# Patient Record
Sex: FEMALE | Race: WHITE
[De-identification: names, ages, dates, MRNs, and addresses within clinical notes are randomized per-mention and may not be internally consistent; named-entity substitution may affect disease eponyms.]

---

## 2019-04-05 ENCOUNTER — HOSPITAL ENCOUNTER (OUTPATIENT)
Dept: HOSPITAL 53 - M SMT | Age: 79
End: 2019-04-05
Attending: NURSE PRACTITIONER
Payer: MEDICARE

## 2019-04-05 DIAGNOSIS — N13.30: Primary | ICD-10-CM

## 2019-04-05 LAB
BUN SERPL-MCNC: 25 MG/DL (ref 7–18)
CALCIUM SERPL-MCNC: 8.5 MG/DL (ref 8.8–10.2)
CHLORIDE SERPL-SCNC: 95 MEQ/L (ref 98–107)
CO2 SERPL-SCNC: 28 MEQ/L (ref 21–32)
CREAT SERPL-MCNC: 1.1 MG/DL (ref 0.55–1.3)
GFR SERPL CREATININE-BSD FRML MDRD: 50.7 ML/MIN/{1.73_M2} (ref 32–?)
GLUCOSE SERPL-MCNC: 100 MG/DL (ref 70–100)
POTASSIUM SERPL-SCNC: 4.5 MEQ/L (ref 3.5–5.1)
SODIUM SERPL-SCNC: 130 MEQ/L (ref 136–145)

## 2019-04-10 ENCOUNTER — HOSPITAL ENCOUNTER (INPATIENT)
Dept: HOSPITAL 53 - M ICU | Age: 79
LOS: 14 days | Discharge: HOME | DRG: 871 | End: 2019-04-24
Attending: INTERNAL MEDICINE | Admitting: INTERNAL MEDICINE
Payer: MEDICARE

## 2019-04-10 VITALS — SYSTOLIC BLOOD PRESSURE: 106 MMHG | DIASTOLIC BLOOD PRESSURE: 53 MMHG

## 2019-04-10 VITALS — DIASTOLIC BLOOD PRESSURE: 68 MMHG | SYSTOLIC BLOOD PRESSURE: 117 MMHG

## 2019-04-10 VITALS — SYSTOLIC BLOOD PRESSURE: 97 MMHG | DIASTOLIC BLOOD PRESSURE: 49 MMHG

## 2019-04-10 VITALS — BODY MASS INDEX: 29.52 KG/M2 | WEIGHT: 150.36 LBS | HEIGHT: 60 IN

## 2019-04-10 VITALS — DIASTOLIC BLOOD PRESSURE: 44 MMHG | SYSTOLIC BLOOD PRESSURE: 90 MMHG

## 2019-04-10 VITALS — SYSTOLIC BLOOD PRESSURE: 120 MMHG | DIASTOLIC BLOOD PRESSURE: 58 MMHG

## 2019-04-10 VITALS — DIASTOLIC BLOOD PRESSURE: 56 MMHG | SYSTOLIC BLOOD PRESSURE: 112 MMHG

## 2019-04-10 VITALS — SYSTOLIC BLOOD PRESSURE: 122 MMHG | DIASTOLIC BLOOD PRESSURE: 56 MMHG

## 2019-04-10 VITALS — DIASTOLIC BLOOD PRESSURE: 43 MMHG | SYSTOLIC BLOOD PRESSURE: 91 MMHG

## 2019-04-10 VITALS — SYSTOLIC BLOOD PRESSURE: 101 MMHG | DIASTOLIC BLOOD PRESSURE: 46 MMHG

## 2019-04-10 VITALS — DIASTOLIC BLOOD PRESSURE: 94 MMHG | SYSTOLIC BLOOD PRESSURE: 142 MMHG

## 2019-04-10 VITALS — DIASTOLIC BLOOD PRESSURE: 40 MMHG | SYSTOLIC BLOOD PRESSURE: 81 MMHG

## 2019-04-10 VITALS — DIASTOLIC BLOOD PRESSURE: 38 MMHG | SYSTOLIC BLOOD PRESSURE: 80 MMHG

## 2019-04-10 VITALS — DIASTOLIC BLOOD PRESSURE: 53 MMHG | SYSTOLIC BLOOD PRESSURE: 110 MMHG

## 2019-04-10 VITALS — SYSTOLIC BLOOD PRESSURE: 133 MMHG | DIASTOLIC BLOOD PRESSURE: 80 MMHG

## 2019-04-10 VITALS — SYSTOLIC BLOOD PRESSURE: 91 MMHG | DIASTOLIC BLOOD PRESSURE: 44 MMHG

## 2019-04-10 VITALS — SYSTOLIC BLOOD PRESSURE: 111 MMHG | DIASTOLIC BLOOD PRESSURE: 53 MMHG

## 2019-04-10 VITALS — DIASTOLIC BLOOD PRESSURE: 39 MMHG | SYSTOLIC BLOOD PRESSURE: 82 MMHG

## 2019-04-10 VITALS — DIASTOLIC BLOOD PRESSURE: 46 MMHG | SYSTOLIC BLOOD PRESSURE: 92 MMHG

## 2019-04-10 VITALS — SYSTOLIC BLOOD PRESSURE: 88 MMHG | DIASTOLIC BLOOD PRESSURE: 49 MMHG

## 2019-04-10 VITALS — SYSTOLIC BLOOD PRESSURE: 104 MMHG | DIASTOLIC BLOOD PRESSURE: 49 MMHG

## 2019-04-10 VITALS — DIASTOLIC BLOOD PRESSURE: 53 MMHG | SYSTOLIC BLOOD PRESSURE: 115 MMHG

## 2019-04-10 VITALS — SYSTOLIC BLOOD PRESSURE: 105 MMHG | DIASTOLIC BLOOD PRESSURE: 52 MMHG

## 2019-04-10 VITALS — DIASTOLIC BLOOD PRESSURE: 47 MMHG | SYSTOLIC BLOOD PRESSURE: 97 MMHG

## 2019-04-10 VITALS — DIASTOLIC BLOOD PRESSURE: 43 MMHG | SYSTOLIC BLOOD PRESSURE: 89 MMHG

## 2019-04-10 VITALS — DIASTOLIC BLOOD PRESSURE: 52 MMHG | SYSTOLIC BLOOD PRESSURE: 97 MMHG

## 2019-04-10 VITALS — DIASTOLIC BLOOD PRESSURE: 51 MMHG | SYSTOLIC BLOOD PRESSURE: 98 MMHG

## 2019-04-10 VITALS — DIASTOLIC BLOOD PRESSURE: 39 MMHG | SYSTOLIC BLOOD PRESSURE: 79 MMHG

## 2019-04-10 VITALS — DIASTOLIC BLOOD PRESSURE: 48 MMHG | SYSTOLIC BLOOD PRESSURE: 93 MMHG

## 2019-04-10 VITALS — SYSTOLIC BLOOD PRESSURE: 115 MMHG | DIASTOLIC BLOOD PRESSURE: 52 MMHG

## 2019-04-10 VITALS — SYSTOLIC BLOOD PRESSURE: 88 MMHG | DIASTOLIC BLOOD PRESSURE: 43 MMHG

## 2019-04-10 DIAGNOSIS — D63.1: ICD-10-CM

## 2019-04-10 DIAGNOSIS — E87.5: ICD-10-CM

## 2019-04-10 DIAGNOSIS — J44.0: ICD-10-CM

## 2019-04-10 DIAGNOSIS — S32.691A: ICD-10-CM
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
## 2019-04-10 DIAGNOSIS — Z79.82: ICD-10-CM

## 2019-04-10 DIAGNOSIS — E87.1: ICD-10-CM

## 2019-04-10 DIAGNOSIS — E87.2: ICD-10-CM

## 2019-04-10 DIAGNOSIS — N13.30: ICD-10-CM

## 2019-04-10 DIAGNOSIS — I50.9: ICD-10-CM

## 2019-04-10 DIAGNOSIS — E11.9: ICD-10-CM

## 2019-04-10 DIAGNOSIS — I27.20: ICD-10-CM

## 2019-04-10 DIAGNOSIS — E78.5: ICD-10-CM

## 2019-04-10 DIAGNOSIS — R65.21: ICD-10-CM

## 2019-04-10 DIAGNOSIS — Z88.8: ICD-10-CM

## 2019-04-10 DIAGNOSIS — N17.9: ICD-10-CM

## 2019-04-10 DIAGNOSIS — I71.4: ICD-10-CM

## 2019-04-10 DIAGNOSIS — I13.0: ICD-10-CM

## 2019-04-10 DIAGNOSIS — M10.9: ICD-10-CM

## 2019-04-10 DIAGNOSIS — A41.9: Primary | ICD-10-CM

## 2019-04-10 DIAGNOSIS — Z79.899: ICD-10-CM

## 2019-04-10 DIAGNOSIS — F03.90: ICD-10-CM

## 2019-04-10 DIAGNOSIS — Z88.0: ICD-10-CM
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
## 2019-04-10 DIAGNOSIS — K21.9: ICD-10-CM

## 2019-04-10 DIAGNOSIS — M81.0: ICD-10-CM

## 2019-04-10 DIAGNOSIS — E83.51: ICD-10-CM

## 2019-04-10 DIAGNOSIS — E03.9: ICD-10-CM

## 2019-04-10 LAB
ALBUMIN SERPL BCG-MCNC: 2.3 GM/DL (ref 3.2–5.2)
ALT SERPL W P-5'-P-CCNC: 16 U/L (ref 12–78)
BASE EXCESS BLDA CALC-SCNC: -7.9 MMOL/L (ref -2–2)
BASOPHILS # BLD AUTO: 0 10^3/UL (ref 0–0.2)
BASOPHILS NFR BLD AUTO: 0.2 % (ref 0–1)
BILIRUB SERPL-MCNC: 0.3 MG/DL (ref 0.2–1)
BUN SERPL-MCNC: 54 MG/DL (ref 7–18)
CALCIUM SERPL-MCNC: 7.6 MG/DL (ref 8.8–10.2)
CHLORIDE SERPL-SCNC: 99 MEQ/L (ref 98–107)
CO2 BLDA CALC-SCNC: 18.6 MEQ/L (ref 23–31)
CO2 SERPL-SCNC: 17 MEQ/L (ref 21–32)
CREAT SERPL-MCNC: 3.83 MG/DL (ref 0.55–1.3)
EOSINOPHIL # BLD AUTO: 0 10^3/UL (ref 0–0.5)
EOSINOPHIL NFR BLD AUTO: 0 % (ref 0–3)
GFR SERPL CREATININE-BSD FRML MDRD: 12 ML/MIN/{1.73_M2} (ref 32–?)
GLUCOSE SERPL-MCNC: 136 MG/DL (ref 70–100)
HCO3 BLDA-SCNC: 17.5 MEQ/L (ref 22–26)
HCO3 STD BLDA-SCNC: 18 MEQ/L (ref 22–26)
HCT VFR BLD AUTO: 28.2 % (ref 36–47)
HGB BLD-MCNC: 8.8 G/DL (ref 12–15.5)
INR PPP: 1.28
LYMPHOCYTES # BLD AUTO: 1.1 10^3/UL (ref 1.5–4.5)
LYMPHOCYTES NFR BLD AUTO: 6.1 % (ref 24–44)
MAGNESIUM SERPL-MCNC: 1.7 MG/DL (ref 1.8–2.4)
MCH RBC QN AUTO: 27.9 PG (ref 27–33)
MCHC RBC AUTO-ENTMCNC: 31.2 G/DL (ref 32–36.5)
MCV RBC AUTO: 89.5 FL (ref 80–96)
MONOCYTES # BLD AUTO: 1.8 10^3/UL (ref 0–0.8)
MONOCYTES NFR BLD AUTO: 10 % (ref 0–5)
NEUTROPHILS # BLD AUTO: 14.7 10^3/UL (ref 1.8–7.7)
NEUTROPHILS NFR BLD AUTO: 82.8 % (ref 36–66)
PCO2 BLDA: 35.3 MMHG (ref 35–45)
PH BLDA: 7.31 UNITS (ref 7.35–7.45)
PLATELET # BLD AUTO: 205 10^3/UL (ref 150–450)
PO2 BLDA: 94.7 MMHG (ref 75–100)
POTASSIUM SERPL-SCNC: 4.9 MEQ/L (ref 3.5–5.1)
PROT SERPL-MCNC: 5.5 GM/DL (ref 6.4–8.2)
PROTHROMBIN TIME: 16.2 SECONDS (ref 12.1–14.4)
RBC # BLD AUTO: 3.15 10^6/UL (ref 4–5.4)
SAO2 % BLDA: 97.5 % (ref 95–99)
SODIUM SERPL-SCNC: 129 MEQ/L (ref 136–145)
TSH SERPL DL<=0.005 MIU/L-ACNC: 0.47 UIU/ML (ref 0.36–3.74)
VANCOMYCIN PEAK SERPL-MCNC: 13.3 UG/ML (ref 18–40)
WBC # BLD AUTO: 17.7 10^3/UL (ref 4–10)

## 2019-04-10 PROCEDURE — 02HV33Z INSERTION OF INFUSION DEVICE INTO SUPERIOR VENA CAVA, PERCUTANEOUS APPROACH: ICD-10-PCS | Performed by: SPECIALIST

## 2019-04-10 RX ADMIN — ACETAMINOPHEN PRN MG: 325 TABLET ORAL at 12:50

## 2019-04-10 RX ADMIN — IPRATROPIUM BROMIDE AND ALBUTEROL SULFATE SCH ML: .5; 3 SOLUTION RESPIRATORY (INHALATION) at 23:29

## 2019-04-10 RX ADMIN — SODIUM CHLORIDE SCH UNITS: 4.5 INJECTION, SOLUTION INTRAVENOUS at 21:07

## 2019-04-10 RX ADMIN — FLUTICASONE PROPIONATE AND SALMETEROL XINAFOATE SCH PUFF: 230; 21 AEROSOL, METERED RESPIRATORY (INHALATION) at 21:00

## 2019-04-10 RX ADMIN — PANTOPRAZOLE SODIUM SCH MG: 20 TABLET, DELAYED RELEASE ORAL at 21:07

## 2019-04-10 RX ADMIN — IPRATROPIUM BROMIDE AND ALBUTEROL SULFATE SCH ML: .5; 3 SOLUTION RESPIRATORY (INHALATION) at 20:19

## 2019-04-10 RX ADMIN — DEXTROSE MONOHYDRATE SCH MLS/HR: 50 INJECTION, SOLUTION INTRAVENOUS at 21:07

## 2019-04-10 RX ADMIN — IPRATROPIUM BROMIDE AND ALBUTEROL SULFATE SCH ML: .5; 3 SOLUTION RESPIRATORY (INHALATION) at 15:24

## 2019-04-10 RX ADMIN — INSULIN LISPRO SCH UNITS: 100 INJECTION, SOLUTION INTRAVENOUS; SUBCUTANEOUS at 17:43

## 2019-04-10 RX ADMIN — MORPHINE SULFATE PRN MG: 4 INJECTION INTRAVENOUS at 17:42

## 2019-04-10 RX ADMIN — MEROPENEM AND SODIUM CHLORIDE SCH MLS/HR: 500 INJECTION, SOLUTION INTRAVENOUS at 15:40

## 2019-04-10 NOTE — REP
Portable chest, 06:46 p.m., single AP semi upright view:

There are no comparisons.

There are no focal infiltrates.

There are no pleural effusions.

There is mild diffuse interstitial coarsening.  This could be chronic, acute or

combination.

The mohini, mediastinum, skeletal structures are unremarkable except for orthopedic

screws in the right humeral head.

Impression:

Interstitial coarsening, chronic, acute versus combination.

Otherwise, negative portable chest.

 

 

Electronically Signed by

Bennie Murrell MD 04/10/2019 07:13 P

## 2019-04-10 NOTE — CR
DATE OF CONSULTATION:  04/10/2019

 

 

History was obtained from the chart and some history of obtained and the patient;

however, she does have some mild dementia and is a poor historian.

 

HISTORY OF PRESENT ILLNESS:  The patient is an 79-year-old female with a past

medical history of chronic obstructive pulmonary disease (COPD) with reported

pulmonary hypertension, diabetes, hypertension, hypothyroidism, chronic kidney

disease (CKD) with an atrophic left kidney had previously been admitted in

January of 2019 where she was intubated in the ICU with pneumonia, as well as

with acute renal failure requiring intermittent dialysis and a nephrostomy tube.

The patient still has a nephrostomy tube, as well as does intermittent

self-catheterization for urine output.  She presented to an outside hospital with

complaints of weakness and fatigue for the past few days.  She has noted

decreasing urine output.  The patient also had subjective chills and had been

complaining of pain in her right hip and leg.  She denied any chest pain.  She

denied any increased shortness of breath, no coughing, no abdominal pain,  no

nausea or vomiting.  On admission, the patient was noted to have some

hypotension, was given IV fluids with improvement in her blood pressure.  The

patient was also febrile with leukocytosis and was started on broad-spectrum

antibiotics.  The patient was also found to have worsening of her chronic renal

failure and nephrology was consulted for possible initiation of dialysis.

 

HISTORY OF PRESENT ILLNESS:

1.  Chronic obstructive pulmonary disease (COPD).

2.  Anemia of chronic disease

3.  Diabetes

4.  Hypertension

5.  Gastroesophageal reflux disease.

6.  Congestive heart failure (CHF)

7.  Hyperlipidemia

8.  Dementia.

9.  Chronic kidney disease (CKD)

10. Hypothyroidism

11. Osteoporosis

12. Status post nephrostomy tube for hydronephrosis

13.  Pelvic fracture

 

ALLERGIES: PENICILLIN, RED DYE, PEANUT OIL.

 

PAST SURGICAL HISTORY:  Right rotator cuff repair, right nephrostomy tube

placement, tumor on her face removed.

 

FAMILY HISTORY:  Noncontributory.

 

SOCIAL HISTORY:  The patient is a nonsmoker.  Denies alcohol use.  She is

. HOME MEDICATIONS:  Amlodipine, aspirin, atorvastatin, Breo, furosemide,

glipizide, Synthroid, linigliptin, lisinopril, magnesium oxide, metformin,

metoprolol, pantoprazole paroxetine, spiriva, albuterol as needed.

 

PHYSICAL EXAMINATION:  T-max 103.1, T-current 98.2 pulse 73 and blood pressure

97/47, oxygen saturation 98% on 4 liters nasal cannula.  General:  The patient is

an elderly female.  He is lying in bed, is awake and alert, oriented x2, is

answering questions appropriately.  She is hard of hearing.  HEENT:

Normocephalic, atraumatic.  Mucous membranes are moist.  Pupils are reactive.

Neck is supple.  Trachea is midline.  No palpable cervical adenopathy.  No

jugular venous distension (JVD).  Regular rate and rhythm.  Normal S1-S2.  Unable

to appreciate any murmurs.  Pulmonary:  Diminished breath sounds bilaterally.

Few crackles at the bases.  Abdomen is obese, soft, nontender, nondistended.

Lower extremities: There is no significant pitting edema bilaterally.  The

patient has a right-sided nephrostomy tube.

 

LABORATORY:  White blood count (WBC) 17.7, hemoglobin 8.8, platelets 205.

Chemistry:  Sodium 129, potassium 4.9, chloride 99, bicarbonate 17 and gap 13,

BUN 54, creatinine 3.83, glucose is 136, lactic acid 2.2.  Repeat was 2.5,

calcium 7.6, magnesium 1.7, 2.3, AST, ALT were normal, thyroid simulating hormone

(TSH) 0.470.  Coagulase:  INR was 1.28.  Arterial blood gas (ABG):  pH 7.313,

pCO2 of 35.3, pO2 of 94.7.

 

Chest x-ray:  After central line placement, showed some increased vascular

markings and increased interstitial markings bilaterally.  There is some

atelectasis in the bases of her lungs and some small trace effusions

bilaterally.

 

ASSESSMENT/PLAN:  Ms. Cordoba is an 79-year-old female with a past medical history

of chronic obstructive pulmonary disease (COPD), diabetes, hypertension,

hyperlipidemia, hypothyroidism, dementia, chronic kidney disease (CKD) with a

history of an atrophic left kidney and a history of a right nephrostomy tube

placed after hydronephrosis, history of intermittent dialysis in the past, who

presented with increasing fatigue and weakness for the past few days.  The

patient was febrile.  She was noted to have leukocytosis.  Patient with sepsis

likely secondary to a urinary source given her history of nephrostomy tube, as

well as a history of apparently intermittent self catheterization.  Her chest

x-ray shows evidence of some pulmonary vascular congestion and atelectasis, but

no focal opacities or infiltrates to suggest pneumonia.  The patient was started

on broad-spectrum antibiotics and given IV fluid hydration; however, she

continues to have increasing lactic acidosis, as well as borderline blood

pressures.  She has received more than 2 liters of normal saline for adequate

fluid resuscitation at this point.  She is also noted to be in oliguric renal

failure with minimal urine output from her Garner and from her nephrostomy bag.

 

Discussed with the patient and her son about central line placement for

vasopressor support for her blood pressure.  There are in agreement; therefore; a

left internal jugular triple lumen was placed and will start Levophed if needed

to maintain a MAP above 65.

Given the evidence of some pulmonary vascular congestion on chest x-ray, as well

a her need for supplemental oxygen, would decrease her IV fluid hydration.  The

patient has received adequate fluid hydration for her sepsis.  Would continue

with broad-spectrum antibiotics with vancomycin and meropenem and check a

procalcitonin. Will followup with renal recommendations.  The patient may require

hemodialysis.  She continues to be oliguric and azotemic.  Will followup her

repeat arterial blood gas (ABG) and renal function panel.

 

For her chronic obstructive pulmonary disease (COPD), will continue with Advair

and Spiriva and continue with nebulizers as needed.

 

Continue with nasal cannula oxygen supplementation to maintain an oxygen

saturation above 90%

 

Deep vein thrombosis (DVT) prophylaxis with heparin.

 

CODE STATUS.  The patient is full code.

 

Total critical care time spent not including any procedures approximately 1 hour

and 30 minutes.

## 2019-04-10 NOTE — HPEPDOC
General


Date of Admission


4/10/2019


Primary Care Physician:  A


Attending Physician:  ANNETTE HOUSTON MD


Chief Complaint


The patient is a 79-year-old female admitted with a reason for visit of Sepsis, 

[place], Renal Failure.


Source:  Patient, RN/MD, EMS notes reviewed, Old records


Exam Limitations:  No limitations


Timing/Duration:  Unsure





History of Present Illness


This is 79 years old white female transferred from [facility] 

emergency room..


Patient has a past medical history of COPD with pulmonary hypertension, diabetes

mellitus, hypertension, hypothyroidism, history of C daily with atrophic left 

kidney.


All of's, so patient had anorexia many episodes. Acute renal failure with a 

creatinine approximately 0.8, but slowly has been increasing to 3.0.


In January 2019. She needed intermittent dialysis and eventually nephrostomy 

tube was placed in


Patient also was admitted to ICU after being intubated, found concurrent 

pneumonia. She has been on a creatinine baseline of 0.7, till day of admission 

when she presented to [facility] with chief complaints of feeling weak and

poor for the last few days. Patient has a nephrostomy tube and also does 

intermittent self cath for urinary output but has been slowly decreasing.


Patient was transferred to [facility] with possible sepsis, possible 

nephrostomy tube dysfunction, etc.





Home Medications


Scheduled


Acetaminophen (Acetaminophen) 325 Mg Tablet, 325 MG PO ONCE, (Reported)


Amlodipine Besylate (Amlodipine Besylate) 5 Mg Tablet, 5 MG PO DAILY, (Reported)


Aspirin (Aspir 81) 81 Mg Tablet.dr, 81 MG PO DAILY, (Reported)


Atorvastatin Calcium (Atorvastatin Calcium) 20 Mg Tablet, 20 MG PO DAILY, 

(Reported)


Fluticasone/Vilanterol (Breo Ellipta 100-25 Mcg INH) 1 Each Blst.w.dev, 1 PUFF 

INH DAILY, (Reported)


Furosemide (Furosemide) 40 Mg Tablet, 40 MG PO DAILY, (Reported)


Glipizide (Glipizide ER) 10 Mg Tab.er.24, 10 MG PO DAILY, (Reported)


Levothyroxine Sodium (Synthroid) 100 Mcg Tablet, 100 MCG PO DAILY, (Reported)


Linagliptin (Tradjenta) 5 Mg Tablet, 5 MG PO DAILY, (Reported)


Lisinopril (Lisinopril) 20 Mg Tablet, 20 MG PO DAILY, (Reported)


Magnesium Oxide (Magnesium Oxide) 400 Mg Tablet, 400 MG PO BID, (Reported)


Metformin HCl (Metformin HCl ER) 750 Mg Tab.er.24h, 750 MG PO QPM, (Reported)


   TAKE WITH EVENING MEAL 


Metoprolol Succinate (Metoprolol Succinate) 25 Mg Tab.er.24h, 25 MG PO DAILY, 

(Reported)


Pantoprazole Sodium (Pantoprazole Sodium) 20 Mg Tablet.dr, 20 MG PO BID, 

(Reported)


Paroxetine HCl (Paroxetine) 10 Mg Tablet, 10 MG PO DAILY, (Reported)


Potassium Chloride (Potassium Chloride) 20 Meq Tablet.er, 40 MEQ PO DAILY, 

(Reported)


Tiotropium Bromide Monohydrate (Spiriva) 18 Mcg Cap.w.dev, 1 INHALATION INH 

DAILY, (Reported)





Scheduled PRN


Albuterol Sulfate (Proair Hfa) 8.5 Gm Hfa.aer.ad, 2 PUFF INH Q4-6 PRN for CHUCK

RTNESS OF BREATH, (Reported)


Diphenhydramine HCl (Diphenhydramine HCl) 25 Mg Capsule, 25 MG PO Q4H PRN for 

ALLERGY SYMPTOMS, (Reported)


Oxycodone HCl (Oxycodone HCl) 10 Mg Tablet, 10 MG PO Q6H PRN for PAIN, 

(Reported)





Allergies


Coded Allergies:  


     Penicillins (Unverified  Allergy, Unknown, 4/10/19)


     budesonide (Unverified  Adverse Reaction, Intermediate, 4/10/19)


   


   NOTES AT PHARMACY [facility] PATIENT HAD BAD SINUS REACTION


   


   AFTER USING


     formoterol (Unverified  Adverse Reaction, Intermediate, 4/10/19)


   


   NOTES AT PHARMACY Critical access hospital PATIENT HAD BAD SINUS REACTION


   


   AFTER USING





Past Medical History


Medical History


As per [facility]


Surgical History


As per [facility]





Social History


* Smoker:  Denies


Alcohol:  Denies


Drugs:  denies





Review of Systems


Constitutional:  Reports: Malaise, Weakness; 


   Denies: Chills, Fever, Night Sweats


Eyes:  Denies: Pain, Vision change


ENT:  Denies: Head Aches, Ear Pain, Dysphagia


Skin:  Denies: Rash, Lesions, Breakdown


Pulmonary:  Denies: Dyspnea, Cough


Cardiovascular:  Denies: Chest Pain, Palpitations, Orthopnea, Paroxysmal Noc. 

Dyspnea, Lt Headedness


Gastrointestinal:  Denies: Nausea, Vomiting, Abdominal Pain, Diarrhea


Genitourinary:  Denies: Dysuria, Frequency, Incontinence, Retention


Hematologic:  Denies: Bruising, Bleeding Excessively


Musculoskeletal:  Denies: Neck Pain, Back Pain, Joint Pain, Muscle Pain, Spasms


Neurological:  Denies: Weakness, Numbness, Change in speech, Confusion


Psych:  Reports: Mood Normal; 


   Denies: Depression, Memory Issues





Physical Examination


General Exam:  Positive: Alert, No Acute Distress


Eye Exam:  Positive: PERRLA, Conjunctiva & lids normal, EOMI; 


   Negative: Sclera icteric


ENT Exam:  Positive: Atraumatic, Mucous membr. moist/pink, Pharynx Normal


Neck Exam:  Positive: Supple; 


   Negative: JVD, thyromegaly


Chest Exam:  Positive: Clear to auscultation, Normal air movement


Heart Exam:  Positive: Rate Normal, Regular Rhythm, Normal S1, Normal S2; 


   Negative: Murmurs, Rubs


Telemetry:  Positive: No significant arrhythmia


Abdomen Exam:  Positive: Normal bowel sounds, Soft; 


   Negative: Tenderness, Hepatospenomegaly


Extremity Exam:  Positive: Normal pulses; 


   Negative: Clubbing, Cyanosis, Edema


Skin Exam:  Positive: Nl turgor and temperature; 


   Negative: Breakdown, Lesion


Neuro Exam:  Positive: Normal Gait, Normal Speech, Cranial Nerves 3-12 NL, 

Reflexes 2+


Psych Exam:  Positive: Mental status NL, Mood NL, Oriented x 3





Vital Signs





Vital Signs








  Date Time  Temp Pulse Resp B/P (MAP) Pulse Ox O2 Delivery O2 Flow Rate FiO2


 


4/10/19 15:25  99      


 


4/10/19 13:30 102.0   112/56 (74) 98  4.0 


 


4/10/19 13:00   20     











Laboratory Data


Labs 24H


Laboratory Tests 2


4/10/19 13:00: 


Anion Gap 13, Glomerular Filtration Rate 12.0L, Blood Urea Nitrogen 54H, 

Creatinine 3.83H, Sodium Level 129L, Potassium Level 4.9, Chloride Level 99, 

Carbon Dioxide Level 17L, Calcium Level 7.6L, Aspartate Amino Transf (AST/SGOT) 

27, Alanine Aminotransferase (ALT/SGPT) 16, Alkaline Phosphatase 93, Total 

Bilirubin 0.3, Total Protein 5.5L, Albumin 2.3L, Magnesium Level 1.7L, 

Albumin/Globulin Ratio 0.72L, Thyroid Stimulating Hormone (TSH) 0.470, 

Vancomycin Level Peak 13.3L


4/10/19 13:09: 


Blood Gas Bicarbonate Standard 18.0L, Arterial Blood pH 7.313L, Arterial Blood 

Partial Pressure CO2 35.3, Arterial Blood Partial Pressure O2 94.7, Arterial 

Blood Total CO2 18.6L, Arterial Blood HCO3 17.5L, Arterial Blood Base Excess -

7.9L, Arterial Blood Oxygen Saturation 97.5


4/10/19 13:42: 


Immature Granulocyte % (Auto) 0.9, White Blood Count 17.7H, Red Blood Count 

3.15L, Hemoglobin 8.8L, Hematocrit 28.2L, Mean Corpuscular Volume 89.5, Mean 

Corpuscular Hemoglobin 27.9, Mean Corpuscular Hemoglobin Concent 31.2L, Red Cell

Distribution Width 14.9H, Platelet Count 205, Neutrophils (%) (Auto) 82.8H, 

Lymphocytes (%) (Auto) 6.1L, Monocytes (%) (Auto) 10.0H, Eosinophils (%) (Auto) 

0.0, Basophils (%) (Auto) 0.2, Neutrophils # (Auto) 14.7H, Lymphocytes # (Auto) 

1.1L, Monocytes # (Auto) 1.8H, Eosinophils # (Auto) 0.0, Basophils # (Auto) 0.0,

Nucleated Red Blood Cells % (auto) 0.0, Prothrombin Time 16.2H, Prothromb Time 

International Ratio 1.28, Lactic Acid Level 2.2*H


CBC/BMP


Laboratory Tests


4/10/19 13:00








Calcium Level 7.6 L, Aspartate Amino Transf (AST/SGOT) 27, Alanine 

Aminotransferase (ALT/SGPT) 16, Alkaline Phosphatase 93, Total Bilirubin 0.3, 

Total Protein 5.5 L, Albumin 2.3 L





4/10/19 13:42








Red Blood Count 3.15 L, Mean Corpuscular Volume 89.5, Mean Corpuscular Hemog

lobin 27.9, Mean Corpuscular Hemoglobin Concent 31.2 L, Red Cell Distribution 

Width 14.9 H, Neutrophils (%) (Auto) 82.8 H, Lymphocytes (%) (Auto) 6.1 L, 

Monocytes (%) (Auto) 10.0 H, Eosinophils (%) (Auto) 0.0, Basophils (%) (Auto) 

0.2, Neutrophils # (Auto) 14.7 H, Lymphocytes # (Auto) 1.1 L, Monocytes # (Auto)

1.8 H, Eosinophils # (Auto) 0.0, Basophils # (Auto) 0.0


Microbiology





Microbiology


4/10/19 Blood Culture, Received


          Pending


4/10/19 Blood Culture, Received


          Pending


4/10/19 Urine Culture, Received


          Pending





Problems





(1) Sepsis


Status:  Acute


Response to Treatment:  Uncompensated


Discussed With:  Consultant


Problem Text:  Admit to MICU


Normal saline 1 L bolus


Normal saline IVF at 100 mL per hour


Tylenol when necessary


Pancultures including blood and urine


CBC, CMP, lactic acid and UA


Chest x-ray


CT abdomen and pelvis without contrast


Start meropenem and vancomycin. Pharmacy to adjust dosage


Discussed with Dr. busch, will monitor nephrostomy tube output


Also, will monitor. Garner catheter output


Strict intake and output


Continue home meds except all antihypertensive meds








Plan / VTE


VTE Prophylaxis Ordered?:  Yes











ANNETTE HOUSTON MD              Apr 10, 2019 15:49

## 2019-04-11 VITALS — SYSTOLIC BLOOD PRESSURE: 114 MMHG | DIASTOLIC BLOOD PRESSURE: 49 MMHG

## 2019-04-11 VITALS — SYSTOLIC BLOOD PRESSURE: 90 MMHG | DIASTOLIC BLOOD PRESSURE: 44 MMHG

## 2019-04-11 VITALS — SYSTOLIC BLOOD PRESSURE: 108 MMHG | DIASTOLIC BLOOD PRESSURE: 51 MMHG

## 2019-04-11 VITALS — DIASTOLIC BLOOD PRESSURE: 56 MMHG | SYSTOLIC BLOOD PRESSURE: 151 MMHG

## 2019-04-11 VITALS — DIASTOLIC BLOOD PRESSURE: 57 MMHG | SYSTOLIC BLOOD PRESSURE: 120 MMHG

## 2019-04-11 VITALS — DIASTOLIC BLOOD PRESSURE: 56 MMHG | SYSTOLIC BLOOD PRESSURE: 132 MMHG

## 2019-04-11 VITALS — DIASTOLIC BLOOD PRESSURE: 53 MMHG | SYSTOLIC BLOOD PRESSURE: 111 MMHG

## 2019-04-11 VITALS — SYSTOLIC BLOOD PRESSURE: 97 MMHG | DIASTOLIC BLOOD PRESSURE: 46 MMHG

## 2019-04-11 VITALS — SYSTOLIC BLOOD PRESSURE: 89 MMHG | DIASTOLIC BLOOD PRESSURE: 51 MMHG

## 2019-04-11 VITALS — SYSTOLIC BLOOD PRESSURE: 122 MMHG | DIASTOLIC BLOOD PRESSURE: 54 MMHG

## 2019-04-11 VITALS — SYSTOLIC BLOOD PRESSURE: 112 MMHG | DIASTOLIC BLOOD PRESSURE: 50 MMHG

## 2019-04-11 VITALS — DIASTOLIC BLOOD PRESSURE: 51 MMHG | SYSTOLIC BLOOD PRESSURE: 106 MMHG

## 2019-04-11 VITALS — DIASTOLIC BLOOD PRESSURE: 51 MMHG | SYSTOLIC BLOOD PRESSURE: 117 MMHG

## 2019-04-11 VITALS — SYSTOLIC BLOOD PRESSURE: 118 MMHG | DIASTOLIC BLOOD PRESSURE: 58 MMHG

## 2019-04-11 VITALS — SYSTOLIC BLOOD PRESSURE: 87 MMHG | DIASTOLIC BLOOD PRESSURE: 43 MMHG

## 2019-04-11 VITALS — DIASTOLIC BLOOD PRESSURE: 61 MMHG | SYSTOLIC BLOOD PRESSURE: 125 MMHG

## 2019-04-11 VITALS — SYSTOLIC BLOOD PRESSURE: 100 MMHG | DIASTOLIC BLOOD PRESSURE: 55 MMHG

## 2019-04-11 VITALS — DIASTOLIC BLOOD PRESSURE: 56 MMHG | SYSTOLIC BLOOD PRESSURE: 112 MMHG

## 2019-04-11 VITALS — DIASTOLIC BLOOD PRESSURE: 53 MMHG | SYSTOLIC BLOOD PRESSURE: 116 MMHG

## 2019-04-11 VITALS — DIASTOLIC BLOOD PRESSURE: 52 MMHG | SYSTOLIC BLOOD PRESSURE: 112 MMHG

## 2019-04-11 VITALS — SYSTOLIC BLOOD PRESSURE: 110 MMHG | DIASTOLIC BLOOD PRESSURE: 51 MMHG

## 2019-04-11 VITALS — SYSTOLIC BLOOD PRESSURE: 89 MMHG | DIASTOLIC BLOOD PRESSURE: 46 MMHG

## 2019-04-11 VITALS — SYSTOLIC BLOOD PRESSURE: 103 MMHG | DIASTOLIC BLOOD PRESSURE: 52 MMHG

## 2019-04-11 VITALS — DIASTOLIC BLOOD PRESSURE: 70 MMHG | SYSTOLIC BLOOD PRESSURE: 112 MMHG

## 2019-04-11 VITALS — DIASTOLIC BLOOD PRESSURE: 55 MMHG | SYSTOLIC BLOOD PRESSURE: 106 MMHG

## 2019-04-11 VITALS — SYSTOLIC BLOOD PRESSURE: 114 MMHG | DIASTOLIC BLOOD PRESSURE: 52 MMHG

## 2019-04-11 VITALS — SYSTOLIC BLOOD PRESSURE: 110 MMHG | DIASTOLIC BLOOD PRESSURE: 52 MMHG

## 2019-04-11 VITALS — SYSTOLIC BLOOD PRESSURE: 116 MMHG | DIASTOLIC BLOOD PRESSURE: 54 MMHG

## 2019-04-11 VITALS — SYSTOLIC BLOOD PRESSURE: 171 MMHG | DIASTOLIC BLOOD PRESSURE: 73 MMHG

## 2019-04-11 VITALS — SYSTOLIC BLOOD PRESSURE: 103 MMHG | DIASTOLIC BLOOD PRESSURE: 49 MMHG

## 2019-04-11 VITALS — DIASTOLIC BLOOD PRESSURE: 53 MMHG | SYSTOLIC BLOOD PRESSURE: 112 MMHG

## 2019-04-11 VITALS — DIASTOLIC BLOOD PRESSURE: 54 MMHG | SYSTOLIC BLOOD PRESSURE: 122 MMHG

## 2019-04-11 VITALS — DIASTOLIC BLOOD PRESSURE: 53 MMHG | SYSTOLIC BLOOD PRESSURE: 109 MMHG

## 2019-04-11 VITALS — SYSTOLIC BLOOD PRESSURE: 187 MMHG | DIASTOLIC BLOOD PRESSURE: 81 MMHG

## 2019-04-11 LAB
ALBUMIN SERPL BCG-MCNC: 2.1 GM/DL (ref 3.2–5.2)
ALT SERPL W P-5'-P-CCNC: 12 U/L (ref 12–78)
BASE EXCESS BLDA CALC-SCNC: -6.7 MMOL/L (ref -2–2)
BASOPHILS # BLD AUTO: 0 10^3/UL (ref 0–0.2)
BASOPHILS NFR BLD AUTO: 0.2 % (ref 0–1)
BILIRUB SERPL-MCNC: 0.3 MG/DL (ref 0.2–1)
BUN SERPL-MCNC: 52 MG/DL (ref 7–18)
BUN SERPL-MCNC: 52 MG/DL (ref 7–18)
CA-I BLD-MCNC: 4.2 MG/DL (ref 4.5–5.3)
CALCIUM SERPL-MCNC: 7.2 MG/DL (ref 8.8–10.2)
CALCIUM SERPL-MCNC: 7.5 MG/DL (ref 8.8–10.2)
CHLORIDE SERPL-SCNC: 100 MEQ/L (ref 98–107)
CHLORIDE SERPL-SCNC: 98 MEQ/L (ref 98–107)
CO2 BLDA CALC-SCNC: 19.2 MEQ/L (ref 23–31)
CO2 SERPL-SCNC: 17 MEQ/L (ref 21–32)
CO2 SERPL-SCNC: 17 MEQ/L (ref 21–32)
CREAT SERPL-MCNC: 3.24 MG/DL (ref 0.55–1.3)
CREAT SERPL-MCNC: 3.79 MG/DL (ref 0.55–1.3)
EOSINOPHIL # BLD AUTO: 0 10^3/UL (ref 0–0.5)
EOSINOPHIL NFR BLD AUTO: 0.2 % (ref 0–3)
FLUAV RNA UPPER RESP QL NAA+PROBE: NEGATIVE
FLUBV RNA UPPER RESP QL NAA+PROBE: NEGATIVE
GFR SERPL CREATININE-BSD FRML MDRD: 12.2 ML/MIN/{1.73_M2} (ref 32–?)
GFR SERPL CREATININE-BSD FRML MDRD: 14.6 ML/MIN/{1.73_M2} (ref 32–?)
GLUCOSE SERPL-MCNC: 161 MG/DL (ref 70–100)
GLUCOSE SERPL-MCNC: 168 MG/DL (ref 70–100)
HCO3 BLDA-SCNC: 18.1 MEQ/L (ref 22–26)
HCO3 STD BLDA-SCNC: 18.9 MEQ/L (ref 22–26)
HCT VFR BLD AUTO: 25.3 % (ref 36–47)
HGB BLD-MCNC: 8.2 G/DL (ref 12–15.5)
LYMPHOCYTES # BLD AUTO: 1 10^3/UL (ref 1.5–4.5)
LYMPHOCYTES NFR BLD AUTO: 6.3 % (ref 24–44)
MAGNESIUM SERPL-MCNC: 2 MG/DL (ref 1.8–2.4)
MCH RBC QN AUTO: 29 PG (ref 27–33)
MCHC RBC AUTO-ENTMCNC: 32.4 G/DL (ref 32–36.5)
MCV RBC AUTO: 89.4 FL (ref 80–96)
MONOCYTES # BLD AUTO: 1.5 10^3/UL (ref 0–0.8)
MONOCYTES NFR BLD AUTO: 9.5 % (ref 0–5)
NEUTROPHILS # BLD AUTO: 13.2 10^3/UL (ref 1.8–7.7)
NEUTROPHILS NFR BLD AUTO: 82.1 % (ref 36–66)
PCO2 BLDA: 33.3 MMHG (ref 35–45)
PH BLDA: 7.35 UNITS (ref 7.35–7.45)
PHOSPHATE SERPL-MCNC: 4.4 MG/DL (ref 2.5–4.9)
PLATELET # BLD AUTO: 195 10^3/UL (ref 150–450)
PO2 BLDA: 63 MMHG (ref 75–100)
POTASSIUM SERPL-SCNC: 4.6 MEQ/L (ref 3.5–5.1)
POTASSIUM SERPL-SCNC: 4.7 MEQ/L (ref 3.5–5.1)
PROT SERPL-MCNC: 4.6 GM/DL (ref 6.4–8.2)
RBC # BLD AUTO: 2.83 10^6/UL (ref 4–5.4)
SAO2 % BLDA: 93.4 % (ref 95–99)
SODIUM SERPL-SCNC: 127 MEQ/L (ref 136–145)
SODIUM SERPL-SCNC: 128 MEQ/L (ref 136–145)
URATE SERPL-MCNC: 8.7 MG/DL (ref 2.6–6)
VANCOMYCIN SERPL-MCNC: 18.4 UG/ML
WBC # BLD AUTO: 16.1 10^3/UL (ref 4–10)

## 2019-04-11 RX ADMIN — MORPHINE SULFATE PRN MG: 4 INJECTION INTRAVENOUS at 05:22

## 2019-04-11 RX ADMIN — IPRATROPIUM BROMIDE AND ALBUTEROL SULFATE SCH ML: .5; 3 SOLUTION RESPIRATORY (INHALATION) at 03:58

## 2019-04-11 RX ADMIN — IPRATROPIUM BROMIDE AND ALBUTEROL SULFATE SCH ML: .5; 3 SOLUTION RESPIRATORY (INHALATION) at 15:15

## 2019-04-11 RX ADMIN — Medication SCH UNITS: at 18:08

## 2019-04-11 RX ADMIN — INSULIN LISPRO SCH UNITS: 100 INJECTION, SOLUTION INTRAVENOUS; SUBCUTANEOUS at 11:45

## 2019-04-11 RX ADMIN — ACETAMINOPHEN PRN MG: 325 TABLET ORAL at 21:13

## 2019-04-11 RX ADMIN — IPRATROPIUM BROMIDE AND ALBUTEROL SULFATE SCH ML: .5; 3 SOLUTION RESPIRATORY (INHALATION) at 08:10

## 2019-04-11 RX ADMIN — ACETAMINOPHEN PRN MG: 325 TABLET ORAL at 02:29

## 2019-04-11 RX ADMIN — MEROPENEM AND SODIUM CHLORIDE SCH MLS/HR: 500 INJECTION, SOLUTION INTRAVENOUS at 16:01

## 2019-04-11 RX ADMIN — INSULIN LISPRO SCH UNITS: 100 INJECTION, SOLUTION INTRAVENOUS; SUBCUTANEOUS at 01:24

## 2019-04-11 RX ADMIN — ATORVASTATIN CALCIUM SCH MG: 20 TABLET, FILM COATED ORAL at 08:51

## 2019-04-11 RX ADMIN — SODIUM CHLORIDE SCH UNITS: 4.5 INJECTION, SOLUTION INTRAVENOUS at 08:51

## 2019-04-11 RX ADMIN — FLUTICASONE PROPIONATE AND SALMETEROL XINAFOATE SCH PUFF: 230; 21 AEROSOL, METERED RESPIRATORY (INHALATION) at 21:00

## 2019-04-11 RX ADMIN — SODIUM CHLORIDE SCH ML: 9 INJECTION, SOLUTION INTRAMUSCULAR; INTRAVENOUS; SUBCUTANEOUS at 21:36

## 2019-04-11 RX ADMIN — ACETAMINOPHEN PRN MG: 325 TABLET ORAL at 18:13

## 2019-04-11 RX ADMIN — LEVOTHYROXINE SODIUM SCH MCG: 100 TABLET ORAL at 05:22

## 2019-04-11 RX ADMIN — DEXTROSE MONOHYDRATE SCH MLS/HR: 50 INJECTION, SOLUTION INTRAVENOUS at 21:25

## 2019-04-11 RX ADMIN — IPRATROPIUM BROMIDE AND ALBUTEROL SULFATE SCH ML: .5; 3 SOLUTION RESPIRATORY (INHALATION) at 18:21

## 2019-04-11 RX ADMIN — ACETAMINOPHEN PRN MG: 325 TABLET ORAL at 11:46

## 2019-04-11 RX ADMIN — MORPHINE SULFATE PRN MG: 4 INJECTION INTRAVENOUS at 09:44

## 2019-04-11 RX ADMIN — MORPHINE SULFATE PRN MG: 4 INJECTION INTRAVENOUS at 16:00

## 2019-04-11 RX ADMIN — FLUTICASONE PROPIONATE AND SALMETEROL XINAFOATE SCH PUFF: 230; 21 AEROSOL, METERED RESPIRATORY (INHALATION) at 09:00

## 2019-04-11 RX ADMIN — MORPHINE SULFATE PRN MG: 4 INJECTION INTRAVENOUS at 19:50

## 2019-04-11 RX ADMIN — MEROPENEM AND SODIUM CHLORIDE SCH MLS/HR: 500 INJECTION, SOLUTION INTRAVENOUS at 04:33

## 2019-04-11 RX ADMIN — IPRATROPIUM BROMIDE AND ALBUTEROL SULFATE SCH ML: .5; 3 SOLUTION RESPIRATORY (INHALATION) at 11:08

## 2019-04-11 RX ADMIN — TIOTROPIUM BROMIDE SCH INHALATION: 18 CAPSULE ORAL; RESPIRATORY (INHALATION) at 08:10

## 2019-04-11 RX ADMIN — SODIUM CHLORIDE SCH UNITS: 4.5 INJECTION, SOLUTION INTRAVENOUS at 21:24

## 2019-04-11 RX ADMIN — IPRATROPIUM BROMIDE AND ALBUTEROL SULFATE PRN ML: .5; 3 SOLUTION RESPIRATORY (INHALATION) at 20:17

## 2019-04-11 RX ADMIN — ASPIRIN SCH MG: 81 TABLET ORAL at 08:51

## 2019-04-11 RX ADMIN — INSULIN LISPRO SCH UNITS: 100 INJECTION, SOLUTION INTRAVENOUS; SUBCUTANEOUS at 06:32

## 2019-04-11 RX ADMIN — PANTOPRAZOLE SODIUM SCH MG: 20 TABLET, DELAYED RELEASE ORAL at 08:51

## 2019-04-11 RX ADMIN — INSULIN LISPRO SCH UNITS: 100 INJECTION, SOLUTION INTRAVENOUS; SUBCUTANEOUS at 18:08

## 2019-04-11 RX ADMIN — PANTOPRAZOLE SODIUM SCH MG: 20 TABLET, DELAYED RELEASE ORAL at 21:00

## 2019-04-11 NOTE — CCN
DATE: 04/11/2019

 

The patient was seen and examined this morning during bedside rounds.  Overnight

the patient was on Levophed for blood pressure support.  Was able to be weaned

off of Levophed early this morning and her blood pressures have remained stable.

She is saturating well on room air oxygen.  She does continue to complain of some

abdominal pain as well as some pain in her right hip and leg from her arthritis.

She has not had any further fevers overnight.  No nausea or vomiting.

 

PHYSICAL EXAMINATION:

Temperature 97.8, pulse 88, respirations 22, blood pressure 97/46, O2  sat 93% on

room air.

GENERAL:  The patient is an elderly female, is lying in bed awake, alert, is

hard-of-hearing but answering questions appropriately.

HEENT:  She is normocephalic, atraumatic.  Mucous membranes are moist.  Pupils

are reactive.

NECK:  Supple.  Trachea is midline.  No palpable cervical adenopathy.

CARDIOVASCULAR:  Regular rate and rhythm.  Normal S1,S2.  Unable to appreciate

murmurs.

PULMONARY:  Diminished breath sounds bilaterally with some crackles at the bases.

ABDOMEN:  Obese, soft.  There is some mild tenderness diffusely.  There is a

right-sided nephrostomy tube in place.

LOWER EXTREMITIES: There is no significant pitting edema bilaterally.

 

LABS: WBC 15.1, hemoglobin 8.2, platelets 195.  Chemistry:  Sodium was 128,

potassium 4.7, chloride 100, bicarb 17, BUN 52, creatinine 3.79, glucose 161,

lactic acid trended down to 1.6.  ABG:  pH 7.354, pCO2 of 33.3, pO2 of 63.

 

ASSESSMENT/PLAN:

Ms. Cordoba is an 79-year female with a past medical history of  chronic

obstructive pulmonary disease (COPD), diabetes, hypertension, hyperlipidemia,

hypothyroidism, dementia, chronic kidney disease (CKD), history of atrophic left

kidney and a history of a right nephrostomy tube for hydronephrosis with a

history of intermittent dialysis in the past who presented with increasing

fatigue and weakness for the past few days.  The patient was febrile with a

leukocytosis.  She has sepsis likely secondary to urinary source given her

history of nephrostomy tube as well as complaint currently of some abdominal

pain.  Her chest x-ray shows some pulmonary vascular congestion and some

atelectasis at the bases with some small pleural effusions.  The patient was

given IV fluid hydration with more than 2 liters of normal saline boluses for

adequate fluid resuscitation.  However, she continued to have hypotension and

increasing lactic acidosis.  Therefore had a central line placed and was started

on Vasopressor support. With the Levophed her lactic acid had improved and she

was able to weaned off of it  this morning.  Her IV fluid hydration was held

yesterday.  This morning she is saturating on the lower side on room air.

However, does not need oxygen as she does not appear to be in respiratory

distress.  She does have some small effusion on her last chest x-ray however, she

is having some improvement in her urine output and her creatinine has remained

stable.  Therefore, I would restart with IV fluid hydration with some bicarbonate

given her mild acidosis in the setting of her chronic renal failure.  Would

continue with fluids and bicarb as per renal recommendations.

Continue with broad-spectrum antibiotics with vancomycin and meropenem and

followup cultures.

Followup procalcitonin results.

Would get a CT abdomen, pelvis given her abdominal complaints and to evaluate for

possible infectious source as well.

Would monitor her blood pressure and maintain a MAP above 65.  Can restart

Levophed if needed if she does not respond to a normal saline bolus of 500 mL.

For her chronic obstructive pulmonary disease (COPD):  Continue with Advair and

Spiriva and continue with nebulizers as needed.

Would monitor her oxygenation and start nasal cannula supplementation as needed

to maintain O2 sat above 90%

Deep venous thrombosis (DVT) prophylaxis with heparin.

 

CODE STATUS:   FULL CODE.

 

Total critical care time spent not including any procedures approximately 30

minutes.

## 2019-04-11 NOTE — REP
Portable chest, 08:00 p.m., single AP sitting view:

 

Comparison is from 06:46 earlier this same date.

Studies performed for line placement.

There is a left IJ central venous catheter.  The catheter descends in the

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

This could be confirmed by CT with IV contrast.

There is no pneumothorax or pleural fluid collection.

The lung fields are unchanged.  Cardiac size is normal.

 

 

Electronically Signed by

Bennie Murrell MD 04/11/2019 07:18 A

## 2019-04-11 NOTE — REP
CT of the abdomen pelvis without IV or bowel contrast for abdominal pain:

There are no comparisons.

There are small bilateral pleural effusions in the visualized lung fields.

The hepatic margin is mildly nodular compatible with cirrhosis.

There is a small gallbladder calculus.  The gallbladder is otherwise

unremarkable.  There is no biliary duct dilatation.

The pancreas is unremarkable.  The spleen is normal size.  There are splenic

calcified granulomas.

The adrenals are unremarkable.

There is a right percutaneous nephrostomy. There is no right perinephric fluid

collection or stranding.

The left kidney is markedly atrophic.

There are is a distal abdominal aortic aneurysm measuring 4.0 cm AP by 3.9 cm

transversely.  There is calcified atheroma throughout the abdominal aorta.  There

is no periaortic hematoma.

There is no bowel distension or obstruction.  There is no ascites.  No

adenopathy.  No pneumoperitoneum.

Pelvis:

The appendix is not identified.  There is no pericecal inflammation or abscess.

There are diverticula in the cecum, descending colon and sigmoid colon without

evidence of diverticulitis.

There is a bladder catheter.  The bladder is nondistended and cannot be further

evaluated.

The uterus and adnexa are unremarkable.

There is no ascites or adenopathy.

There are fractures of the right ischium and pubic symphysis on the right.

There is advanced bilateral hip osteoarthritis.

 

Impression:

Abdominal aortic aneurysm. No periaortic hematoma.

Diverticulosis without diverticulitis.

Right percutaneous nephrostomy.

Marked left renal atrophy.

Fractures of the right ischium and right pubic symphysis.

Advanced bilateral hip osteoarthritis.

 

 

Electronically Signed by

Bennie Murrell MD 04/11/2019 11:58 A

## 2019-04-11 NOTE — CR
DATE OF CONSULTATION: 04/10/2019

 

REQUESTING PHYSICIAN: Dr. Inder Izquierdo

 

REASON FOR CONSULTATION: Management of acute renal failure. Shock and metabolic

acidosis.

 

CHIEF COMPLAINT: The patient was transferred from an outside hospital because of

acute renal failure and sepsis.

 

HISTORY OF PRESENT ILLNESS: Note: Most of the history was obtained from the

patient's chart and from the medical team. The patient herself is not able to

provide any reliable history.

 

Jennie Cordoba is an 79-year-old female presumably with history of left atrophic left

kidney and solitary functioning right kidney which has a percutaneous

nephrostomy. The patient has a past medical history of diabetes mellitus type 2,

hypertension, chronic pulmonary obstructive disease (COPD). In January 2019 the

patient had sepsis, acute renal failure.  She was admitted to  ICU and was

intubated. She developed pneumonia at that time. She briefly required

hemodialysis during that hospitalization. She got a percutaneous nephrostomy on

the right side and after that her renal function recovered. As per the records,

her baseline creatinine is around 0.8. The patient presented to the emergency

room at an outside hospital today where she was found to have high grade fever.

She was obtunded. She had minimal urine output from the nephrostomy and even

after Garner catheter placement. She had elevated white cell counts and elevated

creatinine. She was transferred to [facility] for higher level of

care.

 

The patient was seen and examined by myself today in the afternoon. She required

emergent attention.  She was unable to provide any reliable history. She was

obtunded. She was following very few commands. She was otherwise opening her

eyes. Clinically she looked very dehydrated with a very dry tongue. Stat normal

saline bolus of 1 liter was ordered by myself. Stat labs including CMP, cultures,

CBC, lactic acid, was ordered.  First dose of IV antibiotics were also ordered.

The patient was able to maintain her blood pressure without any pressors when I

saw the patient in the intensive care unit (ICU).

 

PAST MEDICAL HISTORY:

1. Renal failure requiring dialysis in January 2019.

2. History of solitary functioning right kidney with a right percutaneous

nephrostomy.

3. History of hypertension.

4. Diabetes mellitus type 2.

5. Hypothyroidism.

6. Chronic pulmonary obstructive disease (COPD).

7. Possible pulmonary hypertension in the past.

 

PAST SURGICAL HISTORY:

1. Right sided percutaneous nephrostomy.

No other recent surgical history is known.

 

FAMILY HISTORY:  I was unable to obtain family history.

 

SOCIAL HISTORY: I was unable to obtain the social history.  As per the

documentation, there is no history of smoking or alcohol abuse or drug abuse.

 

REVIEW OF SYSTEMS: The patient was unable to provide the full review of systems.

However, at this time she is obtunded. She was in mild respiratory distress.  She

reported that her belly hurts.

 

PHYSICAL EXAMINATION:

General: The patient is obtunded, clinically dehydrated. She is laying in bed.

Vital signs:  Temperature on arrival was 104.3 degrees Fahrenheit, blood pressure

111/53, pulse 97, respiratory rate 20, [id number]% on nasal cannula at 4

liters.

Head/Neck Exam: The patient is normocephalic atraumatic. Mucous membranes are

very dry. Neck is supple. There is no jugular venous distention (JVD).

Cardiovascular: S1, S2, tachycardia.

Extremities: No edema of the bilateral lower extremities.

Respiratory: Mildly decreased breath sounds at the bases. No active rales or

rhonchi.

Abdomen: Soft, midline surgical scar is noted. She has a periumbilical hernia in

the midline. No significant organomegaly was noted. She has a right sided

percutaneous nephrostomy. Urine in the bag is clear.

Genitourinary: The patient has an indwelling Garner catheter, minimal amount  of

urine in the Garner catheter was noted.

Musculoskeletal: No clubbing or cyanosis.

Central Nervous System (CNS):  The patient is obtunded, otherwise she follows

commands and moves her extremities.

Skin: No rashes or ulcers.

Lymphatics:  No significant cervical, axillary, or inguinal lymphadenopathy.

 

LAB REVIEW: CBC shows WBC 17.7, hemoglobin 8.8, platelets 205, INR 1.28. Arterial

blood gas (ABG) showed pH of 7.31, PCO2 35, PO2 94, bicarbonate 17.5, Oxygen sat

97.5%. Basic metabolic panel (BMP) shows sodium 129, potassium 4.9, chloride 99,

bicarbonate 17, BUN 54, creatinine 3.8, lactic acid 2.2, calcium 7.6, magnesium

1.7, albumin 2.3, vancomycin level is 13.3.

 

MICROBIOLOGY: Blood culture and urine culture are pending.

 

IMAGING:  Chest x-ray was done on arrival which showed interstitial coarsening

which was acute versus chronic.

 

HOME MEDICATIONS:

- Tylenol as needed

- albuterol as needed

- amlodipine 5 mg daily

- aspirin 81 mg daily

- Lipitor 20 mg daily

- Breo Ellipta one puff daily

- Lasix 40 mg by mouth daily

- Glipizide 10 mg by mouth daily

- levothyroxine 100 mcg by mouth daily

- Tradjenta 5 mg by mouth daily

- lisinopril 20 mg by mouth daily

- magnesium oxide 400 mg by mouth twice daily

- metformin 750 mg by mouth daily in the evening

- metoprolol 25 mg by mouth daily

- Oxycodone 10 mg every 6 hours as needed  pain

- Protonix 20 mg by mouth twice daily

- paroxetine 10 mg daily

- potassium chloride 40 mEq by mouth daily

- Spiriva inhalation daily

 

CURRENT INPATIENT MEDICATIONS: The patient's inpatient medications were all

reviewed by me.

She has been started on meropenem 500 mg IV every 12 hours.

She was given normal saline boluses.

She was also given a dose of vancomycin.

- aspirin 81 mg daily

- Lipitor 20 mg by mouth daily

- started on ibuprofen which I am stopping right now

- Protonix 20 mg by mouth twice a day

- Advair two puffs twice a day

- Spiriva inhalation daily

 

ASSESSMENT: The patient is an 79-year-old female with acute renal failure, severe

sepsis, metabolic acidosis, with history of diabetes mellitus type 2,

hypertension, chronic pulmonary obstructive disease and hypothyroidism in the

past.

 

PLAN:

1. Severe sepsis. The patient came in with temperatures of more than 104 degrees

Fahrenheit. She has leukocytosis, tachycardia, lactic acidosis. She was given

broad-spectrum antibiotics, cultures have been sent. Chest x-ray did not show any

evidence of infection. CAT scan of the abdomen was already done in the emergency

room before the patient was transferred over here. I have requested the critical

care team, Dr. Radha Carpio to come onboard as well. The patient will need

central line placement, CVP monitoring, and possible initiation of pressors as

needed. 30 mL/kg of intravenous (IV) fluid initial resuscitation has already been

given.

 

2. Acute renal failure. Acute renal failure is multifactorial most likely

secondary to a combination of severe sepsis, use of ace inhibitors, diuretics and

metformin at home. Avoid further use of angiotensin converting enzymes (ACE) or

nonsteroidal antiinflammatories (NSAIDs) at this point. Continue to monitor for

renal recovery. If the patient's renal function does not start improving over the

next 24 hours, then she will need initiation of hemodialysis.

 

3. Metabolic acidosis.  The patient has renal failure, and lactic acidosis If

acidosis does not improve by tomorrow, then the patient will need initiation of

hemodialysis.

 

4. Diabetes mellitus type 2. Okay to use insulin sliding scale.  At this point

avoid the use of metformin because  of renal failure and lactic acidosis.

 

5. Anemia. Hemoglobin is 8.8 which is low. Baseline hemoglobin is not known.

Continue to monitor CBC, transfuse as needed  for hemoglobin less than 8.

 

6. History of chronic pulmonary obstructive disease (COPD). Okay to continue BREO

Ellipta and Spiriva at this point as per home dosage.

 

7. Hypothyroidism. Continue home dose of levothyroxine 100 mcg by mouth daily.

 

Thank you for involving me in the care of this patient. I shall be happy to

follow the patient along with you tomorrow morning.

 

Total critical care time spent in the management of this patient this afternoon

in the intensive care unit (ICU) was 50 minutes. That does not include any

procedures.

## 2019-04-11 NOTE — PHACANCOPD
PHARMACY VANCOMYCIN DOSING


Pt Demographics


Demographics


Patient Age:79 , Weight:67.800  , Gender: female


Adjusted Body Weight


Date: 4/10/19, Adjusted Body Weight:  Kg





Vancomycin


Vancomycin indication:  SEPSIS


Vancomycin Load Y/N:  Yes


Load Dose Date Time


Vancomycin Load Dose:   1000MG      Date:  4/10       Time:  430AM AT [facility]


Vancomycin Dose


Date: 4/10/19. Current Vancomycin Dose: [750MG Q24H@21]


Intermittent Dosing?:  No





Labs


Micro





Microbiology


4/10/19 Blood Culture, Received


          Pending


4/10/19 Blood Culture, Received


          Pending


4/10/19 Urine Culture, Received


          Pending


Creatinine Clearance


Date:4/10/19. Creatinine Clearance: .





Assessment and Plan


Maintaining Current Dose?:  Yes


Reason for dose change:  No Dose Change





Pharmacist Note


Pharmacist Note


4/11/19: Day #2 empiric vancomycin therapy. A random level was drawn this 

morning which resulted at 18.4mcg/ml, to ensure that the patient was remaining 

within therapeutic levels given poor renal function. We will maintain the 

patient on her current regimen of 750mg IV Q24H @2100, and schedule a follow-up 

trough level when appropriate.





Date: 4/10/19. Pharmacist note:  Patient was transferred to here from [facility] today septic.  She was admitted with a temperature of 104, WBC of 17.2,

and a lactic acid level of 2.2.  Her SCR was elevated at 3.83 upon admission, 

and her SCR on 4/5/19 was 1.1.  She received 1 gram of Vancomycin at [facility] at 430am.  A random level was obtained upon admission here and resulted

at 13.3.  No imaging or H&P has been done yet.  She has blood cultures pending. 

She will be started on Vancomycin 750mg q24h starting tonight at 2100.  We will 

continue to monitor and make adjustments.











[facility]          Apr 11, 2019 08:10

## 2019-04-11 NOTE — RO
DATE OF PROCEDURE:  04/10/2019

 

INDICATION:  Vasopressor and vascular access.

 

PREPROCEDURE DIAGNOSIS:  Septic shock.

 

POSTPROCEDURE DIAGNOSIS:  Septic shock.

 

ATTENDING PHYSICIAN:  Radha Carpio MD



 

CONSENT:  Consent was obtained from the patient and her son prior to the

procedure.  Indication, risks and benefits were explained at length.

 

PROCEDURE SUMMARY:  A central line insertion practices form was completed by

independent observer.  Starting with the first hand wash prior to starting

sterile technique.  A time out was performed.  My hands were washed immediately

prior to the procedure.  Full sterile technique was maintained throughout the

procedure including surgical cap, mask with protective eye wear, full gown and

sterile gloves throughout.

 

The patient was placed in Trendelenburg position.  The left neck and chest 
region

was prepped using chlorhexidine scrub and draped in a sterile fashion using a

full drape and a sterile probe cover employed.  The left internal jugular vein

was identified using ultrasound.  Anesthesia was achieved over the vein using 1%

lidocaine.  Using real time out of plane guidance, the introducer needle was

inserted into the left internal jugular vein under direct ultrasound

visualization.  Venous blood was drawn.  The syringe was removed and a Guidewire

was advanced into the introducer needle.  The introducer needle was removed over

the Guidewire.  A small incision was made at the skin surface with a scalpel and

a dilator was exchanged over the guidewire.  After appropriate dilation was

obtained, the dilator was exchanged over the wire for a triple lumen central

venous catheter.  The wire was removed and the catheter was sutured in place at

20 cm.  A sterile chlorhexidine impregnated dressing was placed over the 
catheter

at the insertion site.

 

The patient tolerated the procedure without any hemodynamic compromise.  At tune

if procedure completion, all ports aspirated and flushed probably.  
Postprocedure

chest x-ray showed the triple lumen crossing over midline with its tip just into

the superior vena cava (SVC).  No pneumothorax was noted.

[facility]

## 2019-04-11 NOTE — IPNPDOC
Subjective


Date Seen


The patient was seen on 4/11/19.





Subjective


Chief Complaint/HPI


The patient lying down in bed comfortable in no acute cardiac or respiratory 

distress


General:  Reports: Normal Appetite; 


   Denies: Chills, Night Sweats, Fatigue, Malaise


Constitutional:  Denies: Chills, Fever, Night Sweats


Eyes:  Denies: Pain, Vision change


ENT:  Denies: Head Aches, Ear Pain, Dysphagia


Skin:  Denies: Rash, Lesions, Breakdown


Pulmonary:  Denies: Dyspnea, Cough


Cardiovascular:  Denies: Chest Pain, Palpitations, Orthopnea, Paroxysmal Noc. 

Dyspnea, Lt Headedness


Gastrointestinal:  Denies: Nausea, Vomiting, Abdominal Pain, Diarrhea, 

Constipation


Genitourinary:  Denies: Dysuria, Frequency, Incontinence, Retention


Hematologic:  Denies: Bruising, Bleeding Excessively


Musculoskeletal:  Denies: Neck Pain, Back Pain, Joint Pain, Muscle Pain, Spasms


Neurological:  Denies: Weakness, Numbness, Change in speech, Confusion


Psych:  Reports: Mood Normal; 


   Denies: Depression, Memory Issues





Objective


Physical Examination


General Exam:  Positive: Alert, No Acute Distress


Eye Exam:  Positive: PERRLA, Conjunctiva & lids normal, EOMI; 


   Negative: Sclera icteric


ENT Exam:  Positive: Atraumatic, Mucous membr. moist/pink, Pharynx Normal


Neck Exam:  Positive: Supple; 


   Negative: JVD, thyromegaly


Chest Exam:  Positive: Clear to auscultation, Normal air movement


Heart Exam:  Positive: Rate Normal, Regular Rhythm, Normal S1, Normal S2; 


   Negative: Murmurs, Rubs


Telemetry:  Positive: No significant arrhythmia


Abdomen Exam:  Positive: Normal bowel sounds, Soft; 


   Negative: Tenderness, Hepatospenomegaly


Extremity Exam:  Positive: Normal pulses; 


   Negative: Clubbing, Cyanosis, Edema


Skin Exam:  Positive: Nl turgor and temperature; 


   Negative: Breakdown, Lesion


Neuro Exam:  Positive: Normal Gait, Normal Speech, Cranial Nerves 3-12 NL, 

Reflexes 2+


Psych Exam:  Positive: Mental status NL, Mood NL, Oriented x 3





Assessment /Plan


Problems





(1) Sepsis


Status:  Acute


Response to Treatment:  Uncompensated


Discussed With:  Consultant


Problem Text:  Patient received 2 L of normal saline boluses followed by normal 

saline at 150 mL/h which was later on decreased to 50 mL per hour. Considering 

patient's history for congestive heart failure after enough hydration. Patient's

blood pressure was found to be hypotensive last night and she was started on 

vasopressors by Dr. Carpio.


Patient is off vasopressor right now. Vital signs are stable. IV fluid normal 

saline at 50 mL per hour. Continue meropenem and vancomycin, which patient seems

to be responding very well


Patient is a lactic acid is down to normal limit, now 1.6


She had a CAT scan done at that out outside facility, which was essentially 

within normal limits at Will order a repeat CT abdomen without contrast 

secondary to tenderness at the abdomen


Urine and blood cultures are still pending


WBC count is slightly low today. We will repeat laboratory work in a.m.


Continue cardiac monitoring. Critical care monitoring


Nephrology and critical care consultation. Appreciate


Further, as per Dr. Mallory and Dr. Carpio recommended. Recommendations








Plan/VTE


VTE Prophylaxis Ordered?:  Yes





VS, I&O, 24H, Fishbone


Vital Signs/I&O





Vital Signs








  Date Time  Temp Pulse Resp B/P (MAP) Pulse Ox O2 Delivery O2 Flow Rate FiO2


 


4/11/19 10:00   18     


 


4/11/19 08:07 98.2 94  122/54 (76) 90   


 


4/11/19 02:30       0.5 














I&O- Last 24 Hours up to 6 AM 


 


 4/11/19





 06:00


 


Intake Total 3272 ml


 


Output Total 430 ml


 


Balance 2842 ml











Laboratory Data


24H LABS


Laboratory Tests 2


4/10/19 13:00: 


Anion Gap 13, Glomerular Filtration Rate 12.0L, Blood Urea Nitrogen 54H, 

Creatinine 3.83H, Sodium Level 129L, Potassium Level 4.9, Chloride Level 99, 

Carbon Dioxide Level 17L, Calcium Level 7.6L, Aspartate Amino Transf (AST/SGOT) 

27, Alanine Aminotransferase (ALT/SGPT) 16, Alkaline Phosphatase 93, Total 

Bilirubin 0.3, Total Protein 5.5L, Albumin 2.3L, Magnesium Level 1.7L, 

Albumin/Globulin Ratio 0.72L, Thyroid Stimulating Hormone (TSH) 0.470, 

Vancomycin Level Peak 13.3L


4/10/19 13:09: 


Blood Gas Bicarbonate Standard 18.0L, Arterial Blood pH 7.313L, Arterial Blood 

Partial Pressure CO2 35.3, Arterial Blood Partial Pressure O2 94.7, Arterial 

Blood Total CO2 18.6L, Arterial Blood HCO3 17.5L, Arterial Blood Base Excess -

7.9L, Arterial Blood Oxygen Saturation 97.5


4/10/19 13:42: 


Immature Granulocyte % (Auto) 0.9, White Blood Count 17.7H, Red Blood Count 

3.15L, Hemoglobin 8.8L, Hematocrit 28.2L, Mean Corpuscular Volume 89.5, Mean 

Corpuscular Hemoglobin 27.9, Mean Corpuscular Hemoglobin Concent 31.2L, Red Cell

Distribution Width 14.9H, Platelet Count 205, Neutrophils (%) (Auto) 82.8H, 

Lymphocytes (%) (Auto) 6.1L, Monocytes (%) (Auto) 10.0H, Eosinophils (%) (Auto) 

0.0, Basophils (%) (Auto) 0.2, Neutrophils # (Auto) 14.7H, Lymphocytes # (Auto) 

1.1L, Monocytes # (Auto) 1.8H, Eosinophils # (Auto) 0.0, Basophils # (Auto) 0.0,

Nucleated Red Blood Cells % (auto) 0.0, Prothrombin Time 16.2H, Prothromb Time 

International Ratio 1.28, Lactic Acid Level 2.2*H


4/10/19 17:35: Bedside Glucose (Misc Panel) 149H


4/10/19 18:11: Lactic Acid Followup at 4 Hours 2.5*H


4/10/19 22:52: Lactic Acid Level 1.6


4/11/19 01:12: Bedside Glucose (Misc Panel) 135H


4/11/19 05:15: 


Immature Granulocyte % (Auto) 1.7, White Blood Count 16.1H, Red Blood Count 

2.83L, Hemoglobin 8.2L, Hematocrit 25.3L, Mean Corpuscular Volume 89.4, Mean 

Corpuscular Hemoglobin 29.0, Mean Corpuscular Hemoglobin Concent 32.4, Red Cell 

Distribution Width 14.9H, Platelet Count 195, Neutrophils (%) (Auto) 82.1H, L

ymphocytes (%) (Auto) 6.3L, Monocytes (%) (Auto) 9.5H, Eosinophils (%) (Auto) 

0.2, Basophils (%) (Auto) 0.2, Neutrophils # (Auto) 13.2H, Lymphocytes # (Auto) 

1.0L, Monocytes # (Auto) 1.5H, Eosinophils # (Auto) 0.0, Basophils # (Auto) 0.0,

Nucleated Red Blood Cells % (auto) 0.0, Blood Gas Bicarbonate Standard 18.9L, 

Arterial Blood pH 7.354, Arterial Blood Partial Pressure CO2 33.3L, Arterial 

Blood Partial Pressure O2 63.0L, Arterial Blood Total CO2 19.2L, Arterial Blood 

HCO3 18.1L, Arterial Blood Base Excess -6.7L, Arterial Blood Oxygen Saturation 

93.4L, Anion Gap 11, Glomerular Filtration Rate 12.2L, Blood Urea Nitrogen 52H, 

Creatinine 3.79H, Sodium Level 128L, Potassium Level 4.7, Chloride Level 100, 

Carbon Dioxide Level 17L, Calcium Level 7.2L, Phosphorus Level 4.4, Aspartate 

Amino Transf (AST/SGOT) 18, Alanine Aminotransferase (ALT/SGPT) 12, Alkaline 

Phosphatase 87, Total Bilirubin 0.3, Total Protein 4.6L, Albumin 2.1L, Whole 

Blood Ionized Calcium 4.2L, Magnesium Level 2.0, Albumin/Globulin Ratio 0.84L, 

Random Vancomycin Level 18.4


4/11/19 06:28: Bedside Glucose (Misc Panel) 150H


CBC/BMP


Laboratory Tests


4/10/19 13:00








Calcium Level 7.6 L, Aspartate Amino Transf (AST/SGOT) 27, Alanine 

Aminotransferase (ALT/SGPT) 16, Alkaline Phosphatase 93, Total Bilirubin 0.3, 

Total Protein 5.5 L, Albumin 2.3 L





4/10/19 13:42








Red Blood Count 3.15 L, Mean Corpuscular Volume 89.5, Mean Corpuscular 

Hemoglobin 27.9, Mean Corpuscular Hemoglobin Concent 31.2 L, Red Cell 

Distribution Width 14.9 H, Neutrophils (%) (Auto) 82.8 H, Lymphocytes (%) (Auto)

6.1 L, Monocytes (%) (Auto) 10.0 H, Eosinophils (%) (Auto) 0.0, Basophils (%) 

(Auto) 0.2, Neutrophils # (Auto) 14.7 H, Lymphocytes # (Auto) 1.1 L, Monocytes #

(Auto) 1.8 H, Eosinophils # (Auto) 0.0, Basophils # (Auto) 0.0





4/11/19 05:15








Calcium Level 7.2 L, Aspartate Amino Transf (AST/SGOT) 18, Alanine 

Aminotransferase (ALT/SGPT) 12, Alkaline Phosphatase 87, Total Bilirubin 0.3, 

Total Protein 4.6 L, Albumin 2.1 L, Red Blood Count 2.83 L, Mean Corpuscular 

Volume 89.4, Mean Corpuscular Hemoglobin 29.0, Mean Corpuscular Hemoglobin 

Concent 32.4, Red Cell Distribution Width 14.9 H, Neutrophils (%) (Auto) 82.1 H,

Lymphocytes (%) (Auto) 6.3 L, Monocytes (%) (Auto) 9.5 H, Eosinophils (%) (Auto)

0.2, Basophils (%) (Auto) 0.2, Neutrophils # (Auto) 13.2 H, Lymphocytes # (Auto)

1.0 L, Monocytes # (Auto) 1.5 H, Eosinophils # (Auto) 0.0, Basophils # (Auto) 

0.0, Phosphorus Level 4.4


Microbiology





Microbiology


4/10/19 Blood Culture, Received


          Pending


4/10/19 Blood Culture, Received


          Pending


4/10/19 Urine Culture, Received


          Pending











ANNETTE HOUSTON MD              Apr 11, 2019 11:23

## 2019-04-11 NOTE — REP
PORTABLE CHEST:

 

AP portable view of the chest is performed. Comparison is made with prior exam

the same day. Left internal jugular line is seen with the tip in the superior

vena cava. There is no pneumothorax. There is mild bibasilar infiltrate and

effusions. Cardiomediastinal silhouette is unchanged.

 

 

Electronically Signed by

Bennie Andrews MD 04/11/2019 08:44 P

## 2019-04-11 NOTE — REP
Portable chest, 06:49 a.m., single AP semi upright view:

 

Comparisons are 04/10/2019.

There is diffuse mild interstitial coarsening, unchanged.  There are no pleural

effusions.  Cardiac size is normal, unchanged.

There is a left IJ central venous catheter descending in the mediastinum on the

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

Orthopedic screws in the right humeral head are unchanged.

Impression:

No interval change.

 

 

Electronically Signed by

Bennei Murrell MD 04/11/2019 07:29 A

## 2019-04-11 NOTE — IPN
DATE:  04/11/2019

 

SUBJECTIVE:  The patient was seen and examined at the bedside in the intensive

care unit (ICU) this morning.  She is a little bit more awake and alert today.

The patient starting making urine through the nephrostomy tube.  She got more IV

fluid hydration overnight.  The critical care team was involved overnight and I

appreciate their help.  She got the left internal jugular (IJ) triple lumen

catheter placed.  She briefly required addition of Levophed for low blood

pressures.  However, Levophed was weaned off this morning.  Cultures are all

negative so far.  Temperatures are getting better.  She is no longer requiring

cooling blankets.  Lactic acid level improved within normal range overnight.

 

OBJECTIVE:

VITAL SIGNS: Temperature is 100.6 degrees Fahrenheit today in the afternoon.

Blood pressure is 132/56, pulse 114, respiratory rate of 20 saturating 93% on

room air.  Intake and output: Urine output recorded as 135 mL yesterday, 130 mL

so far today since overnight.  Weight on the bed scale is 67.8 kg.

 

PHYSICAL EXAMINATION:

GENERAL:  Patient is laying in bed in mild painful distress. She is oriented

times one.  She is very hard of hearing.

HEAD/NECK EXAM:  Extraocular muscles intact.  Pupils equally round and reactive

to light.  Mucous membranes are slightly dry but better today compared with

yesterday.  Neck is supple.  She has a left IJ triple lumen catheter.

CARDIOVASCULAR:  S1, S2, tachycardia.  No edema of the bilateral lower

extremities.

RESPIRATORY:  Chest is clear to auscultation bilaterally.  Bilateral good air

entry.  No rales or rhonchi.

ABDOMEN: Soft.  Midline surgical scars are noted.  Vague abdominal pain in deep

inspiration in the epigastric area.

GENITOURINARY:  She has a right-sided percutaneous nephrostomy and a Garner

catheter as well.  Small amount of urine was found in the Garner catheter and the

nephrostomy bags.

MUSCULOSKELETAL:  No clubbing or stenosis.  Pulses are 2+.

CENTRAL NERVOUS SYSTEM:  Patient is able to follow a few commands.   She is awake

and moves extremities.

 

LAB REVIEW:  CBC showed a WBC of 16.1, hemoglobin 8.2, platelets 195.  ABG done

this morning showed a pH 7.35, pCO2 33, pO2 63, bicarbonate is 18, Oxygen

saturation is 93%.  BMP showed sodium 128, potassium 4.7, chloride 100,

bicarbonate 17, BUN 52, creatinine 3.7, calcium 7.2, ionized calcium is 4.2,

albumin 2.1, and the mitomycin level is 18.4.

 

MICROBIOLOGY:  Blood cultures and urine cultures are pending.

 

IMAGING:  A CT scan of the abdomen and pelvis without IV or oral contrast was

done which showed abdominal aortic aneurysm, which was around 4 cm in diameter.

No periaortic hematoma.  Right-sided percutaneous nephrostomy, left renal

atrophy.  Fractures of the right ischium and right pubic symphysis.  Advanced

bilateral hip osteoarthritis.

 

CURRENT INPATIENT MEDICATIONS:  Patient required Levophed overnight, which was

stopped.  She is on meropenem 500 mg every 12 hours.  She has been started on

sodium bicarbonate 75 mEq and half normal saline of 60 mL/h.  Vancomycin is being

adjusted by pharmacy.  No other change in the medications today as compared with

yesterday.

 

ASSESSMENT/PLAN:

1.  Severe sepsis:  Patient continues to have fever spikes.  She continues to

have leukocytosis. She briefly required Levophed overnight.  She has a central

line.  I am going to resume the IV fluid, bicarbonate-containing fluid at 60

mL/h.  Source of infection is not known at this point.  Cultures are pending.

Dose of antibiotic is adequate for renal failure.

 

2.  Acute renal failure: Patient has solitary functioning right kidney with

hydronephrosis, status post right-sided percutaneous nephrostomy left renal

atrophy.  Multifactorial etiology of renal failure including use of angiotensin

converting enzyme inhibitors (ACE) inhibitors and metformin at home and sepsis

and hypotension on arrival.   Continue IV fluid hydration.  No urgent need of

hemodialysis at this point.

 

3.  Metabolic acidosis:  It was secondary to a combination of lactic acidosis and

renal failure.  IV bicarbonate-containing fluids have been started.

 

4.  Hypocalcemia:  Patient will be given calcium gluconate 1 gram IV times one

dose today.

 

5.  Hypothyroidism:  Continue home dose of levothyroxine 100 mcg by mouth daily.

 

 

6.  Anemia:  Patient's hemoglobin is staying stable at 8.2.  Some of it is

dilutional.  No need of blood transfusion at this point.

 

7.  CHRONIC OBSTRUCTIVE PULMONARY DISEASE (COPD):  Continue Advair and Spiriva as

per pulmonary recommendations.

 

8.  Abdominal aortic aneurysm:  This is a new finding.  She has a 4 cm aortic

aneurysm.  There is no hematoma.  Management will be done electively by vascular

surgery.

 

9.  Right ischium and right pubic symphysis fractures:  Patient most likely had a

fall at home.  Primary team is going to call orthopaedic surgery on board for

further recommendations.

 

Total critical care time spent in the management of this patient this morning in

the ICU is 50 minutes.

## 2019-04-12 VITALS — DIASTOLIC BLOOD PRESSURE: 55 MMHG | SYSTOLIC BLOOD PRESSURE: 108 MMHG

## 2019-04-12 VITALS — SYSTOLIC BLOOD PRESSURE: 130 MMHG | DIASTOLIC BLOOD PRESSURE: 57 MMHG

## 2019-04-12 VITALS — SYSTOLIC BLOOD PRESSURE: 87 MMHG | DIASTOLIC BLOOD PRESSURE: 52 MMHG

## 2019-04-12 VITALS — DIASTOLIC BLOOD PRESSURE: 53 MMHG | SYSTOLIC BLOOD PRESSURE: 91 MMHG

## 2019-04-12 VITALS — SYSTOLIC BLOOD PRESSURE: 104 MMHG | DIASTOLIC BLOOD PRESSURE: 68 MMHG

## 2019-04-12 VITALS — DIASTOLIC BLOOD PRESSURE: 55 MMHG | SYSTOLIC BLOOD PRESSURE: 101 MMHG

## 2019-04-12 VITALS — DIASTOLIC BLOOD PRESSURE: 66 MMHG | SYSTOLIC BLOOD PRESSURE: 104 MMHG

## 2019-04-12 VITALS — SYSTOLIC BLOOD PRESSURE: 120 MMHG | DIASTOLIC BLOOD PRESSURE: 52 MMHG

## 2019-04-12 VITALS — DIASTOLIC BLOOD PRESSURE: 55 MMHG | SYSTOLIC BLOOD PRESSURE: 109 MMHG

## 2019-04-12 VITALS — SYSTOLIC BLOOD PRESSURE: 98 MMHG | DIASTOLIC BLOOD PRESSURE: 53 MMHG

## 2019-04-12 VITALS — DIASTOLIC BLOOD PRESSURE: 54 MMHG | SYSTOLIC BLOOD PRESSURE: 104 MMHG

## 2019-04-12 VITALS — SYSTOLIC BLOOD PRESSURE: 111 MMHG | DIASTOLIC BLOOD PRESSURE: 57 MMHG

## 2019-04-12 VITALS — DIASTOLIC BLOOD PRESSURE: 52 MMHG | SYSTOLIC BLOOD PRESSURE: 105 MMHG

## 2019-04-12 VITALS — DIASTOLIC BLOOD PRESSURE: 64 MMHG | SYSTOLIC BLOOD PRESSURE: 97 MMHG

## 2019-04-12 VITALS — DIASTOLIC BLOOD PRESSURE: 56 MMHG | SYSTOLIC BLOOD PRESSURE: 110 MMHG

## 2019-04-12 VITALS — DIASTOLIC BLOOD PRESSURE: 55 MMHG | SYSTOLIC BLOOD PRESSURE: 96 MMHG

## 2019-04-12 VITALS — DIASTOLIC BLOOD PRESSURE: 52 MMHG | SYSTOLIC BLOOD PRESSURE: 108 MMHG

## 2019-04-12 VITALS — DIASTOLIC BLOOD PRESSURE: 53 MMHG | SYSTOLIC BLOOD PRESSURE: 88 MMHG

## 2019-04-12 VITALS — SYSTOLIC BLOOD PRESSURE: 116 MMHG | DIASTOLIC BLOOD PRESSURE: 63 MMHG

## 2019-04-12 VITALS — DIASTOLIC BLOOD PRESSURE: 64 MMHG | SYSTOLIC BLOOD PRESSURE: 121 MMHG

## 2019-04-12 LAB
ALBUMIN SERPL BCG-MCNC: 2.3 GM/DL (ref 3.2–5.2)
ALT SERPL W P-5'-P-CCNC: 17 U/L (ref 12–78)
APPEARANCE UR: CLEAR
BACTERIA UR QL AUTO: (no result)
BASE EXCESS BLDA CALC-SCNC: -5.7 MMOL/L (ref -2–2)
BILIRUB SERPL-MCNC: 0.3 MG/DL (ref 0.2–1)
BILIRUB UR QL STRIP.AUTO: NEGATIVE
BUN SERPL-MCNC: 49 MG/DL (ref 7–18)
CA-I BLD-MCNC: 4.3 MG/DL (ref 4.5–5.3)
CALCIUM SERPL-MCNC: 7.6 MG/DL (ref 8.8–10.2)
CHLORIDE SERPL-SCNC: 100 MEQ/L (ref 98–107)
CO2 BLDA CALC-SCNC: 19.1 MEQ/L (ref 23–31)
CO2 SERPL-SCNC: 20 MEQ/L (ref 21–32)
CREAT SERPL-MCNC: 3.02 MG/DL (ref 0.55–1.3)
GFR SERPL CREATININE-BSD FRML MDRD: 15.8 ML/MIN/{1.73_M2} (ref 32–?)
GLUCOSE SERPL-MCNC: 106 MG/DL (ref 70–100)
GLUCOSE UR QL STRIP.AUTO: NEGATIVE MG/DL
HCO3 BLDA-SCNC: 18.2 MEQ/L (ref 22–26)
HCO3 STD BLDA-SCNC: 19.6 MEQ/L (ref 22–26)
HCT VFR BLD AUTO: 26.3 % (ref 36–47)
HGB BLD-MCNC: 8.6 G/DL (ref 12–15.5)
HGB UR QL STRIP.AUTO: (no result)
KETONES UR QL STRIP.AUTO: NEGATIVE MG/DL
LEUKOCYTE ESTERASE UR QL STRIP.AUTO: (no result)
MAGNESIUM SERPL-MCNC: 2 MG/DL (ref 1.8–2.4)
MCH RBC QN AUTO: 28.1 PG (ref 27–33)
MCHC RBC AUTO-ENTMCNC: 32.7 G/DL (ref 32–36.5)
MCV RBC AUTO: 85.9 FL (ref 80–96)
NITRITE UR QL STRIP.AUTO: NEGATIVE
PCO2 BLDA: 30.1 MMHG (ref 35–45)
PH BLDA: 7.4 UNITS (ref 7.35–7.45)
PH UR STRIP.AUTO: 6 UNITS (ref 5–9)
PHOSPHATE SERPL-MCNC: 3.8 MG/DL (ref 2.5–4.9)
PLATELET # BLD AUTO: 224 10^3/UL (ref 150–450)
PO2 BLDA: 53.8 MMHG (ref 75–100)
POTASSIUM SERPL-SCNC: 4.8 MEQ/L (ref 3.5–5.1)
PROT SERPL-MCNC: 4.9 GM/DL (ref 6.4–8.2)
PROT UR QL STRIP.AUTO: NEGATIVE MG/DL
RBC # BLD AUTO: 3.06 10^6/UL (ref 4–5.4)
RBC # UR AUTO: 1 /HPF (ref 0–3)
SAO2 % BLDA: 88.3 % (ref 95–99)
SODIUM SERPL-SCNC: 129 MEQ/L (ref 136–145)
SP GR UR STRIP.AUTO: 1 (ref 1–1.03)
SQUAMOUS #/AREA URNS AUTO: 0 /HPF (ref 0–6)
UROBILINOGEN UR QL STRIP.AUTO: 0.2 MG/DL (ref 0–2)
WBC # BLD AUTO: 12.1 10^3/UL (ref 4–10)
WBC #/AREA URNS AUTO: 11 /HPF (ref 0–3)

## 2019-04-12 RX ADMIN — MORPHINE SULFATE PRN MG: 4 INJECTION INTRAVENOUS at 04:20

## 2019-04-12 RX ADMIN — INSULIN LISPRO SCH UNITS: 100 INJECTION, SOLUTION INTRAVENOUS; SUBCUTANEOUS at 11:13

## 2019-04-12 RX ADMIN — IPRATROPIUM BROMIDE AND ALBUTEROL SULFATE SCH ML: .5; 3 SOLUTION RESPIRATORY (INHALATION) at 20:00

## 2019-04-12 RX ADMIN — INSULIN LISPRO SCH UNITS: 100 INJECTION, SOLUTION INTRAVENOUS; SUBCUTANEOUS at 21:00

## 2019-04-12 RX ADMIN — TIOTROPIUM BROMIDE SCH INHALATION: 18 CAPSULE ORAL; RESPIRATORY (INHALATION) at 07:32

## 2019-04-12 RX ADMIN — IPRATROPIUM BROMIDE AND ALBUTEROL SULFATE SCH ML: .5; 3 SOLUTION RESPIRATORY (INHALATION) at 07:32

## 2019-04-12 RX ADMIN — ASPIRIN SCH MG: 81 TABLET ORAL at 09:14

## 2019-04-12 RX ADMIN — Medication SCH UNITS: at 21:04

## 2019-04-12 RX ADMIN — MORPHINE SULFATE PRN MG: 4 INJECTION INTRAVENOUS at 09:16

## 2019-04-12 RX ADMIN — DOCUSATE SODIUM SCH MG: 100 CAPSULE, LIQUID FILLED ORAL at 21:04

## 2019-04-12 RX ADMIN — SODIUM CHLORIDE SCH ML: 9 INJECTION, SOLUTION INTRAMUSCULAR; INTRAVENOUS; SUBCUTANEOUS at 14:28

## 2019-04-12 RX ADMIN — IPRATROPIUM BROMIDE AND ALBUTEROL SULFATE SCH ML: .5; 3 SOLUTION RESPIRATORY (INHALATION) at 04:23

## 2019-04-12 RX ADMIN — IPRATROPIUM BROMIDE AND ALBUTEROL SULFATE SCH ML: .5; 3 SOLUTION RESPIRATORY (INHALATION) at 00:31

## 2019-04-12 RX ADMIN — ATORVASTATIN CALCIUM SCH MG: 20 TABLET, FILM COATED ORAL at 09:13

## 2019-04-12 RX ADMIN — SODIUM CHLORIDE SCH UNITS: 4.5 INJECTION, SOLUTION INTRAVENOUS at 09:14

## 2019-04-12 RX ADMIN — MEROPENEM AND SODIUM CHLORIDE SCH MLS/HR: 500 INJECTION, SOLUTION INTRAVENOUS at 03:58

## 2019-04-12 RX ADMIN — FLUTICASONE PROPIONATE AND SALMETEROL XINAFOATE SCH PUFF: 230; 21 AEROSOL, METERED RESPIRATORY (INHALATION) at 07:33

## 2019-04-12 RX ADMIN — SODIUM CHLORIDE SCH ML: 9 INJECTION, SOLUTION INTRAMUSCULAR; INTRAVENOUS; SUBCUTANEOUS at 09:20

## 2019-04-12 RX ADMIN — LEVOTHYROXINE SODIUM SCH MCG: 100 TABLET ORAL at 09:14

## 2019-04-12 RX ADMIN — SODIUM CHLORIDE SCH UNITS: 4.5 INJECTION, SOLUTION INTRAVENOUS at 21:04

## 2019-04-12 RX ADMIN — IPRATROPIUM BROMIDE AND ALBUTEROL SULFATE SCH ML: .5; 3 SOLUTION RESPIRATORY (INHALATION) at 16:07

## 2019-04-12 RX ADMIN — HYDROCODONE BITARTRATE AND ACETAMINOPHEN PRN TAB: 5; 325 TABLET ORAL at 11:13

## 2019-04-12 RX ADMIN — INSULIN LISPRO SCH UNITS: 100 INJECTION, SOLUTION INTRAVENOUS; SUBCUTANEOUS at 05:59

## 2019-04-12 RX ADMIN — IPRATROPIUM BROMIDE AND ALBUTEROL SULFATE SCH ML: .5; 3 SOLUTION RESPIRATORY (INHALATION) at 23:32

## 2019-04-12 RX ADMIN — HYDROCODONE BITARTRATE AND ACETAMINOPHEN PRN TAB: 5; 325 TABLET ORAL at 21:03

## 2019-04-12 RX ADMIN — HYDROCODONE BITARTRATE AND ACETAMINOPHEN PRN TAB: 5; 325 TABLET ORAL at 17:25

## 2019-04-12 RX ADMIN — PANTOPRAZOLE SODIUM SCH MG: 20 TABLET, DELAYED RELEASE ORAL at 21:03

## 2019-04-12 RX ADMIN — Medication SCH UNITS: at 05:41

## 2019-04-12 RX ADMIN — MORPHINE SULFATE PRN MG: 4 INJECTION INTRAVENOUS at 00:37

## 2019-04-12 RX ADMIN — MEROPENEM AND SODIUM CHLORIDE SCH MLS/HR: 500 INJECTION, SOLUTION INTRAVENOUS at 16:29

## 2019-04-12 RX ADMIN — FEBUXOSTAT SCH MG: 40 TABLET, COATED ORAL at 14:27

## 2019-04-12 RX ADMIN — MAGNESIUM HYDROXIDE PRN ML: 400 SUSPENSION ORAL at 17:01

## 2019-04-12 RX ADMIN — INSULIN LISPRO SCH UNITS: 100 INJECTION, SOLUTION INTRAVENOUS; SUBCUTANEOUS at 01:12

## 2019-04-12 RX ADMIN — PANTOPRAZOLE SODIUM SCH MG: 20 TABLET, DELAYED RELEASE ORAL at 09:14

## 2019-04-12 RX ADMIN — Medication SCH UNITS: at 14:27

## 2019-04-12 RX ADMIN — IPRATROPIUM BROMIDE AND ALBUTEROL SULFATE SCH ML: .5; 3 SOLUTION RESPIRATORY (INHALATION) at 11:08

## 2019-04-12 RX ADMIN — SODIUM CHLORIDE SCH ML: 9 INJECTION, SOLUTION INTRAMUSCULAR; INTRAVENOUS; SUBCUTANEOUS at 21:05

## 2019-04-12 RX ADMIN — INSULIN LISPRO SCH UNITS: 100 INJECTION, SOLUTION INTRAVENOUS; SUBCUTANEOUS at 17:02

## 2019-04-12 RX ADMIN — FLUTICASONE PROPIONATE AND SALMETEROL XINAFOATE SCH PUFF: 230; 21 AEROSOL, METERED RESPIRATORY (INHALATION) at 20:00

## 2019-04-12 RX ADMIN — DEXTROSE MONOHYDRATE SCH MLS/HR: 50 INJECTION, SOLUTION INTRAVENOUS at 22:15

## 2019-04-12 NOTE — REP
Portable chest, 07:15 a.m., single AP semi upright view:

 

Comparison is 04/11/2019 at 06:40 p.m..

The right basilar infiltrate has increased.

Left basilar infiltrate is unchanged.

Lung fields otherwise clear.

Cardiac size is mildly enlarged, unchanged.

The left IJ central venous catheter tip superimposed over the superior vena cava,

unchanged.

Impression:

The right basilar infiltrate has increased.

 

 

Electronically Signed by

Bennie Murrell MD 04/12/2019 08:30 A

## 2019-04-12 NOTE — IPN
DATE OF SERVICE: 04/12/2019

 

SUBJECTIVE:

The patient was seen and examined at the bedside today morning.  She is

hemodynamically more stable today.  She was sitting up on the sofa and eating her

breakfast with the help of her nursing the morning when I saw her.  Last 24 hour

events were noted.  Patient was hypoxemic and tachycardic overnight.  She was

evaluated by the on call resident.  The case was discussed with myself by the

resident and decision was to stop the IV fluid and give her a dose of Lasix.  She

was given Lasix 40 mg IV times one dose.  She responded well to the diuretics.

Her renal function is gradually improving and urine output is getting better.

She continues to have off and on fever spikes, however, leukocytosis is gradually

improving.

 

OBJECTIVE:

VITAL SIGNS: Temperature is 100.8 degrees Fahrenheit, blood pressure 104/66,

pulse 123, respiratory rate of 20 saturating 92% on 2 liters via nasal cannula.

 

Intake and output: Urine output recorded as almost 1100 mL yesterday and about

500 mL so far today since overnight.  Weight on the bed scale is 72 kg.

 

PHYSICAL EXAMINATION:

GENERAL:  Patient is awake, alert, oriented times two.  She is hard of hearing.

Sitting up on the sofa wearing nasal cannula.

HEAD/NECK EXAM:  Extraocular muscles intact.  Pupils equally round and reactive

to light.  Mucous membranes are moist.  Neck is supple.  No jugular venous

distention (JVD).  She has a left IJ triple lumen catheter.

CARDIOVASCULAR:  S1, S2, tachycardia.  No edema of the bilateral lower

extremities.

RESPIRATORY:  Mildly decreased breath sounds at the bases with mild crepitations

on deep inspiration.

ABDOMEN: Soft.  Old midline surgical scar.

GENITOURINARY:  She has a right-sided percutaneous nephrostomy tube and she has a

Garner catheter as well.  Urine in both bags is clear.

MUSCULOSKELETAL:  No clubbing or stenosis.  Pulses are 2+.

CENTRAL NERVOUS SYSTEM:  Patient has resting tremors both bilateral upper and

lower extremities.  Otherwise she is awake and alert and able to follow commands.

 

 

LAB REVIEW:

CBC showed a WBC of 12.1, hemoglobin 8.6, platelets 224.  ABG done today morning

showed a pH 7.39, pCO2 30, pO2 53, bicarbonate is 18.2, Oxygen saturation is

88.3%.  BMP today morning showed sodium 129, potassium 4.8, chloride 100,

bicarbonate 20, BUN 49, creatinine 3, it was 3.2 yesterday, ionized calcium is

4.2, albumin 2.3.

 

MICROBIOLOGY:  Blood cultures and urine cultures are negative so far.

 

IMAGING:

A chest x-ray was done today morning that showed right basilar infiltrate has

increased.

 

CURRENT INPATIENT MEDICATIONS:

Patient was given a dose of calcium gluconate 1 gram IV today morning.  She

continues to be on IV meropenem.  IV fluids have been stopped.  She was given one

dose of Lasix 40 mg overnight and I have ordered another dose of Lasix 20 mg IV

today morning.  I have started her on Uloric 40 mg by mouth daily along with

prednisone prophylaxis of 20 mg by mouth daily.

 

ASSESSMENT/PLAN:

1.  Sepsis.  Patient continues to have fever spikes.  Leukocytosis is improving.

She is responding to the IV antibiotics.  Cultures are negative so far.  Most

likely source is urine although cultures are negative, but patient was already

given antibiotics when cultures were sent.

 

2.  Acute renal failure.  Patient has history of renal failure requiring dialysis

in the past as well.  However, acute renal failure is multifactorial secondary to

severe sepsis and hypotension on arrival.  He uses angiotensin-converting enzyme

(ACE) inhibitors and metformin at home.  Renal function is gradually improving.

Urine output is more than a liter a day.  I will continue to monitor.

 

3.  Hypoxemia secondary to fluid overload.  Patient was aggressively hydrated

which caused respiratory distress.  She was given a dose of Lasix 40 mg IV which

helped improved her breathing.  I have given her another dose of Lasix 20 mg IV

times one dose.

 

4.  Metabolic acidosis.  It was secondary to acute renal failure.  Bicarbonate

level is gradually improving with improvement in the renal function.  No need for

bicarbonate administration at this point.

 

5.  Hypocalcemia.  Patient will be given a dose of calcium gluconate 1 gram IV

times one dose.

 

6.  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  Patient's uric acid level

done yesterday is 8.7.  I have started her on Uloric 40 mg by mouth daily along

with a prednisone of 20 mg daily for 5 days for acute gout flare-up prophylaxis.

 

7.  Anemia secondary to chronic kidney disease.  Hemoglobin is 8.6 which is

accetable for now.  No need of blood transfusion at this point.

 

8.  Hyponatremia.  Patient has hypervolemic hyponatremia.  Sodium level is

improving with improvement in the renal function and more urine output.

 

9.  Chronic obstructive pulmonary artery disease (COPD).  Continue Advair and

Spiriva.

 

10. Fever spikes.  Secondary to sepsis.  Avoid use of nonsteroidal

anti-inflammatory drugs (NSAIDs).  It is okay to take Tylenol as needed.

 

Total critical care time spent in the management of this patient today morning in

the intensive care unit (ICU) is 45 minutes, that does not include any

procedures.

## 2019-04-12 NOTE — IPNPDOC
Subjective


Date Seen


The patient was seen on 4/12/19.





Subjective


Chief Complaint/HPI


Patient comfortable off BiPAP on oxygen by nasal cannula. NO new complaints


General:  Reports: Normal Appetite; 


   Denies: Chills, Night Sweats, Fatigue, Malaise


Constitutional:  Denies: Chills, Fever, Night Sweats


Eyes:  Denies: Pain, Vision change


ENT:  Denies: Head Aches, Ear Pain, Dysphagia


Skin:  Denies: Rash, Lesions, Breakdown


Pulmonary:  Denies: Dyspnea, Cough


Cardiovascular:  Denies: Chest Pain, Palpitations, Orthopnea, Paroxysmal Noc. 

Dyspnea, Lt Headedness


Gastrointestinal:  Denies: Nausea, Vomiting, Abdominal Pain, Diarrhea, 

Constipation


Genitourinary:  Denies: Dysuria, Frequency, Incontinence, Retention


Hematologic:  Denies: Bruising, Bleeding Excessively


Musculoskeletal:  Denies: Neck Pain, Back Pain, Joint Pain, Muscle Pain, Spasms


Neurological:  Denies: Weakness, Numbness, Change in speech, Confusion


Psych:  Reports: Mood Normal; 


   Denies: Depression, Memory Issues





Objective


Physical Examination


General Exam:  Positive: Alert, No Acute Distress


Eye Exam:  Positive: PERRLA, Conjunctiva & lids normal, EOMI; 


   Negative: Sclera icteric


ENT Exam:  Positive: Atraumatic, Mucous membr. moist/pink, Pharynx Normal


Neck Exam:  Positive: Supple; 


   Negative: JVD, thyromegaly


Chest Exam:  Positive: Clear to auscultation, Normal air movement


Heart Exam:  Positive: Rate Normal, Regular Rhythm, Normal S1, Normal S2; 


   Negative: Murmurs, Rubs


Telemetry:  Positive: No significant arrhythmia


Abdomen Exam:  Positive: Normal bowel sounds, Soft; 


   Negative: Tenderness, Hepatospenomegaly


Extremity Exam:  Positive: Normal pulses; 


   Negative: Clubbing, Cyanosis, Edema


Skin Exam:  Positive: Nl turgor and temperature; 


   Negative: Breakdown, Lesion


Neuro Exam:  Positive: Normal Gait, Normal Speech, Cranial Nerves 3-12 NL, 

Reflexes 2+


Psych Exam:  Positive: Mental status NL, Mood NL, Oriented x 3





Assessment /Plan


Problems





(1) Sepsis


Status:  Acute


Response to Treatment:  Uncompensated


Discussed With:  Consultant


Problem Text:  Patient received 2 L of normal saline boluses followed by normal 

saline at 150 mL/h which was later on decreased to 50 mL per hour. Considering 

patient's history for congestive heart failure after enough hydration. Patient's

blood pressure was found to be hypotensive last night and she was started on 

vasopressors by Dr. Carpio.


Patient is off vasopressor right now. Vital signs are stable. IV fluid normal 

saline at 50 mL per hour. Continue meropenem and vancomycin, which patient seems

to be responding very well


Patient is a lactic acid is down to normal limit, now 1.6


She had a CAT scan done at that out outside facility, which was essentially 

within normal limits at Will order a repeat CT abdomen without contrast 

secondary to tenderness at the abdomen


Urine and blood cultures are still pending


WBC count is slightly low today. We will repeat laboratory work in a.m.


Continue cardiac monitoring. Critical care monitoring


He responded very well to diuresis, now with good urine output


Possible transfer to PCU for further care. He is off BiPAP and will start BiPAP 

at nighttime


BTL to get him out of bed


His blood sugar has been fine, so his Lantus was increased and Solu-Medrol has 

been changed to by mouth prednisone to decrease  serum blood sugar


Nephrology and critical care consultation. Appreciate


Further, as per Dr. Mallory and Dr. Carpio recommended. Recommendations








Plan/VTE


VTE Prophylaxis Ordered?:  Yes





VS, I&O, 24H, Fishbone


Vital Signs/I&O





Vital Signs








  Date Time  Temp Pulse Resp B/P (MAP) Pulse Ox O2 Delivery O2 Flow Rate FiO2


 


4/12/19 12:00       2.0 


 


4/12/19 11:43   20     


 


4/12/19 06:15 100.8    92   35


 


4/12/19 06:00  123  104/66 (79)    














I&O- Last 24 Hours up to 6 AM 


 


 4/12/19





 06:00


 


Intake Total 2165 ml


 


Output Total 1719 ml


 


Balance 446 ml











Laboratory Data


24H LABS


Laboratory Tests 2


4/11/19 17:25: Bedside Glucose (Misc Panel) 127H


4/11/19 21:29: 


Anion Gap 12, Glomerular Filtration Rate 14.6L, Blood Urea Nitrogen 52H, 

Creatinine 3.24H, Sodium Level 127L, Potassium Level 4.6, Chloride Level 98, 

Carbon Dioxide Level 17L, Calcium Level 7.5L


4/12/19 00:35: Bedside Glucose (Misc Panel) 170H


4/12/19 05:08: 


Anion Gap 9, Glomerular Filtration Rate 15.8L, Blood Urea Nitrogen 49H, 

Creatinine 3.02H, Sodium Level 129L, Potassium Level 4.8, Chloride Level 100, 

Carbon Dioxide Level 20L, Calcium Level 7.6L, Nucleated Red Blood Cells % (auto)

0.0, Blood Gas Bicarbonate Standard 19.6L, Arterial Blood pH 7.399, Arterial 

Blood Partial Pressure CO2 30.1L, Arterial Blood Partial Pressure O2 53.8L, 

Arterial Blood Total CO2 19.1L, Arterial Blood HCO3 18.2L, Arterial Blood Base 

Excess -5.7L, Arterial Blood Oxygen Saturation 88.3L, Phosphorus Level 3.8, 

Aspartate Amino Transf (AST/SGOT) 38H, Alanine Aminotransferase (ALT/SGPT) 17, 

Alkaline Phosphatase 107, Total Bilirubin 0.3, Total Protein 4.9L, Albumin 2.3L,

Whole Blood Ionized Calcium 4.3L, Magnesium Level 2.0, Albumin/Globulin Ratio 

0.88L


4/12/19 11:08: Bedside Glucose (Misc Panel) 184H


CBC/BMP


Laboratory Tests


4/11/19 21:29








Calcium Level 7.5 L





4/12/19 05:08








Calcium Level 7.6 L, Red Blood Count 3.06 L, Mean Corpuscular Volume 85.9, Mean 

Corpuscular Hemoglobin 28.1, Mean Corpuscular Hemoglobin Concent 32.7, Red Cell 

Distribution Width 15.2 H, Phosphorus Level 3.8, Aspartate Amino Transf (AS

T/SGOT) 38 H, Alanine Aminotransferase (ALT/SGPT) 17, Alkaline Phosphatase 107, 

Total Bilirubin 0.3, Total Protein 4.9 L, Albumin 2.3 L


Microbiology





Microbiology


4/10/19 Blood Culture - Preliminary, Resulted


          No growth after 24 hours . All specim...


4/10/19 Blood Culture - Preliminary, Resulted


          No growth after 24 hours . All specim...


4/10/19 Urine Culture - Final, Complete











ANNETTE HOUSTON MD              Apr 12, 2019 13:43

## 2019-04-12 NOTE — IPNPDOC
Subjective


Date Seen


The patient was seen on 4/12/19.





Subjective


Chief Complaint/HPI


Yesterday patient had an episode of hypoxia after coughing on a some liquids


Patient had a chest x-ray done which were essentially negative. He was started 

on Ventimask and did later received Lasix 20 mg IV push with good improvement of

the symptoms


Action is currently currently sitting in a chair in no apparent distress,  

afebrile at the present time


General:  Reports: Normal Appetite; 


   Denies: Chills, Night Sweats, Fatigue, Malaise


Constitutional:  Denies: Chills, Fever, Night Sweats


Eyes:  Denies: Pain, Vision change


ENT:  Denies: Head Aches, Ear Pain, Dysphagia


Skin:  Denies: Rash, Lesions, Breakdown


Pulmonary:  Denies: Dyspnea, Cough


Cardiovascular:  Denies: Chest Pain, Palpitations, Orthopnea, Paroxysmal Noc. 

Dyspnea, Lt Headedness


Gastrointestinal:  Denies: Nausea, Vomiting, Abdominal Pain, Diarrhea, 

Constipation


Genitourinary:  Denies: Dysuria, Frequency, Incontinence, Retention


Hematologic:  Denies: Bruising, Bleeding Excessively


Musculoskeletal:  Denies: Neck Pain, Back Pain, Joint Pain, Muscle Pain, Spasms


Neurological:  Denies: Weakness, Numbness, Change in speech, Confusion


Psych:  Reports: Mood Normal; 


   Denies: Depression, Memory Issues





Objective


Physical Examination


General Exam:  Positive: Alert, No Acute Distress


Eye Exam:  Positive: PERRLA, Conjunctiva & lids normal, EOMI; 


   Negative: Sclera icteric


ENT Exam:  Positive: Atraumatic, Mucous membr. moist/pink, Pharynx Normal


Neck Exam:  Positive: Supple; 


   Negative: JVD, thyromegaly


Chest Exam:  Positive: Clear to auscultation, Normal air movement


Heart Exam:  Positive: Rate Normal, Regular Rhythm, Normal S1, Normal S2; 


   Negative: Murmurs, Rubs


Telemetry:  Positive: No significant arrhythmia


Abdomen Exam:  Positive: Normal bowel sounds, Soft; 


   Negative: Tenderness, Hepatospenomegaly


Female  Exam:  Positive: Nl Ext Genitalia; 


   Negative: Lesions, Discharge, Odor, Tenderness


Extremity Exam:  Positive: Normal pulses; 


   Negative: Clubbing, Cyanosis, Edema


Skin Exam:  Positive: Nl turgor and temperature; 


   Negative: Breakdown, Lesion


Neuro Exam:  Positive: Normal Gait, Normal Speech, Cranial Nerves 3-12 NL, 

Reflexes 2+


Psych Exam:  Positive: Mental status NL, Mood NL, Oriented x 3





Assessment /Plan


Problems





(1) Sepsis


Status:  Acute


Response to Treatment:  Uncompensated


Discussed With:  Consultant


Problem Text:  Patient received 2 L of normal saline boluses followed by normal 

saline at 150 mL/h which was later on decreased to 50 mL per hour. Considering 

patient's history for congestive heart failure after enough hydration. Patient's

blood pressure was found to be hypotensive last night and she was started on 

vasopressors by Dr. Carpio.


Patient is off vasopressor right now. Vital signs are stable. IV fluid normal 

saline at 50 mL per hour. Continue meropenem and vancomycin, which patient seems

to be responding very well


Patient is a lactic acid is down to normal limit, now 1.6


She had a CAT scan done at that out outside facility, which was essentially 

within normal limits at Will order a repeat CT abdomen without contrast sec

ondary to tenderness at the abdomen: Repeat CAT scan of the abdomen is 

essentially within normal limits


Urine and blood cultures are still pending


WBC count is slightly low today. We will repeat laboratory work in a.m.


Continue cardiac monitoring. Critical care monitoring


 Continue IV antibiotics. Awaiting pending cultures. Reports


Chopped diet with thickened fluid as recommended by speech and swallow


Out of bed as tolerated


Nephrology and critical care consultation. Appreciate


Further, as per Dr. Mallory and Dr. Carpio recommended. Recommendations








Plan/VTE


VTE Prophylaxis Ordered?:  Yes





VS, I&O, 24H, Fishbone


Vital Signs/I&O





Vital Signs








  Date Time  Temp Pulse Resp B/P (MAP) Pulse Ox O2 Delivery O2 Flow Rate FiO2


 


4/12/19 12:00       2.0 


 


4/12/19 11:43   20     


 


4/12/19 06:15 100.8    92   35


 


4/12/19 06:00  123  104/66 (79)    














I&O- Last 24 Hours up to 6 AM 


 


 4/12/19





 06:00


 


Intake Total 2165 ml


 


Output Total 1719 ml


 


Balance 446 ml











Laboratory Data


24H LABS


Laboratory Tests 2


4/11/19 17:25: Bedside Glucose (Misc Panel) 127H


4/11/19 21:29: 


Anion Gap 12, Glomerular Filtration Rate 14.6L, Blood Urea Nitrogen 52H, 

Creatinine 3.24H, Sodium Level 127L, Potassium Level 4.6, Chloride Level 98, 

Carbon Dioxide Level 17L, Calcium Level 7.5L


4/12/19 00:35: Bedside Glucose (Misc Panel) 170H


4/12/19 05:08: 


Anion Gap 9, Glomerular Filtration Rate 15.8L, Blood Urea Nitrogen 49H, 

Creatinine 3.02H, Sodium Level 129L, Potassium Level 4.8, Chloride Level 100, 

Carbon Dioxide Level 20L, Calcium Level 7.6L, Nucleated Red Blood Cells % (auto)

0.0, Blood Gas Bicarbonate Standard 19.6L, Arterial Blood pH 7.399, Arterial 

Blood Partial Pressure CO2 30.1L, Arterial Blood Partial Pressure O2 53.8L, 

Arterial Blood Total CO2 19.1L, Arterial Blood HCO3 18.2L, Arterial Blood Base 

Excess -5.7L, Arterial Blood Oxygen Saturation 88.3L, Phosphorus Level 3.8, 

Aspartate Amino Transf (AST/SGOT) 38H, Alanine Aminotransferase (ALT/SGPT) 17, A

lkaline Phosphatase 107, Total Bilirubin 0.3, Total Protein 4.9L, Albumin 2.3L, 

Whole Blood Ionized Calcium 4.3L, Magnesium Level 2.0, Albumin/Globulin Ratio 

0.88L


4/12/19 11:08: Bedside Glucose (Misc Panel) 184H


CBC/BMP


Laboratory Tests


4/11/19 21:29








Calcium Level 7.5 L





4/12/19 05:08








Calcium Level 7.6 L, Red Blood Count 3.06 L, Mean Corpuscular Volume 85.9, Mean 

Corpuscular Hemoglobin 28.1, Mean Corpuscular Hemoglobin Concent 32.7, Red Cell 

Distribution Width 15.2 H, Phosphorus Level 3.8, Aspartate Amino Transf 

(AST/SGOT) 38 H, Alanine Aminotransferase (ALT/SGPT) 17, Alkaline Phosphatase 

107, Total Bilirubin 0.3, Total Protein 4.9 L, Albumin 2.3 L


Microbiology





Microbiology


4/10/19 Blood Culture - Preliminary, Resulted


          No growth after 24 hours . All specim...


4/10/19 Blood Culture - Preliminary, Resulted


          No growth after 24 hours . All specim...


4/10/19 Urine Culture - Final, Complete











ANNETTE HOUSTON MD              Apr 12, 2019 13:49

## 2019-04-13 VITALS — SYSTOLIC BLOOD PRESSURE: 133 MMHG | DIASTOLIC BLOOD PRESSURE: 72 MMHG

## 2019-04-13 VITALS — DIASTOLIC BLOOD PRESSURE: 58 MMHG | SYSTOLIC BLOOD PRESSURE: 121 MMHG

## 2019-04-13 VITALS — DIASTOLIC BLOOD PRESSURE: 72 MMHG | SYSTOLIC BLOOD PRESSURE: 149 MMHG

## 2019-04-13 VITALS — DIASTOLIC BLOOD PRESSURE: 59 MMHG | SYSTOLIC BLOOD PRESSURE: 125 MMHG

## 2019-04-13 LAB
ALBUMIN SERPL BCG-MCNC: 2.3 GM/DL (ref 3.2–5.2)
ALT SERPL W P-5'-P-CCNC: 24 U/L (ref 12–78)
BILIRUB SERPL-MCNC: 0.2 MG/DL (ref 0.2–1)
BUN SERPL-MCNC: 47 MG/DL (ref 7–18)
CALCIUM SERPL-MCNC: 7.9 MG/DL (ref 8.8–10.2)
CHLORIDE SERPL-SCNC: 99 MEQ/L (ref 98–107)
CO2 SERPL-SCNC: 23 MEQ/L (ref 21–32)
CREAT SERPL-MCNC: 1.97 MG/DL (ref 0.55–1.3)
GFR SERPL CREATININE-BSD FRML MDRD: 25.9 ML/MIN/{1.73_M2} (ref 32–?)
GLUCOSE SERPL-MCNC: 197 MG/DL (ref 70–100)
HCT VFR BLD AUTO: 23.8 % (ref 36–47)
HGB BLD-MCNC: 7.9 G/DL (ref 12–15.5)
MAGNESIUM SERPL-MCNC: 2 MG/DL (ref 1.8–2.4)
MCH RBC QN AUTO: 28.8 PG (ref 27–33)
MCHC RBC AUTO-ENTMCNC: 33.2 G/DL (ref 32–36.5)
MCV RBC AUTO: 86.9 FL (ref 80–96)
PLATELET # BLD AUTO: 197 10^3/UL (ref 150–450)
POTASSIUM SERPL-SCNC: 4.5 MEQ/L (ref 3.5–5.1)
PROT SERPL-MCNC: 5 GM/DL (ref 6.4–8.2)
RBC # BLD AUTO: 2.74 10^6/UL (ref 4–5.4)
SODIUM SERPL-SCNC: 131 MEQ/L (ref 136–145)
VANCOMYCIN SERPL-MCNC: 22.4 UG/ML
WBC # BLD AUTO: 6.7 10^3/UL (ref 4–10)

## 2019-04-13 RX ADMIN — Medication SCH UNITS: at 05:15

## 2019-04-13 RX ADMIN — HYDROCODONE BITARTRATE AND ACETAMINOPHEN PRN TAB: 5; 325 TABLET ORAL at 22:32

## 2019-04-13 RX ADMIN — Medication SCH UNITS: at 21:06

## 2019-04-13 RX ADMIN — INSULIN LISPRO SCH UNITS: 100 INJECTION, SOLUTION INTRAVENOUS; SUBCUTANEOUS at 17:19

## 2019-04-13 RX ADMIN — MEROPENEM AND SODIUM CHLORIDE SCH MLS/HR: 500 INJECTION, SOLUTION INTRAVENOUS at 04:00

## 2019-04-13 RX ADMIN — INSULIN LISPRO SCH UNITS: 100 INJECTION, SOLUTION INTRAVENOUS; SUBCUTANEOUS at 07:49

## 2019-04-13 RX ADMIN — SODIUM CHLORIDE SCH ML: 9 INJECTION, SOLUTION INTRAMUSCULAR; INTRAVENOUS; SUBCUTANEOUS at 14:00

## 2019-04-13 RX ADMIN — IPRATROPIUM BROMIDE AND ALBUTEROL SULFATE SCH ML: .5; 3 SOLUTION RESPIRATORY (INHALATION) at 23:50

## 2019-04-13 RX ADMIN — SODIUM CHLORIDE SCH UNITS: 4.5 INJECTION, SOLUTION INTRAVENOUS at 08:18

## 2019-04-13 RX ADMIN — INSULIN LISPRO SCH UNITS: 100 INJECTION, SOLUTION INTRAVENOUS; SUBCUTANEOUS at 12:02

## 2019-04-13 RX ADMIN — SODIUM CHLORIDE SCH ML: 9 INJECTION, SOLUTION INTRAMUSCULAR; INTRAVENOUS; SUBCUTANEOUS at 21:06

## 2019-04-13 RX ADMIN — IPRATROPIUM BROMIDE AND ALBUTEROL SULFATE SCH ML: .5; 3 SOLUTION RESPIRATORY (INHALATION) at 04:29

## 2019-04-13 RX ADMIN — ASPIRIN SCH MG: 81 TABLET ORAL at 08:17

## 2019-04-13 RX ADMIN — DOCUSATE SODIUM SCH MG: 100 CAPSULE, LIQUID FILLED ORAL at 08:17

## 2019-04-13 RX ADMIN — IPRATROPIUM BROMIDE AND ALBUTEROL SULFATE SCH ML: .5; 3 SOLUTION RESPIRATORY (INHALATION) at 19:41

## 2019-04-13 RX ADMIN — DEXTROSE MONOHYDRATE SCH MLS/HR: 50 INJECTION, SOLUTION INTRAVENOUS at 11:29

## 2019-04-13 RX ADMIN — ATORVASTATIN CALCIUM SCH MG: 20 TABLET, FILM COATED ORAL at 08:17

## 2019-04-13 RX ADMIN — FLUTICASONE PROPIONATE AND SALMETEROL XINAFOATE SCH PUFF: 230; 21 AEROSOL, METERED RESPIRATORY (INHALATION) at 11:18

## 2019-04-13 RX ADMIN — SODIUM CHLORIDE SCH UNITS: 4.5 INJECTION, SOLUTION INTRAVENOUS at 20:34

## 2019-04-13 RX ADMIN — PANTOPRAZOLE SODIUM SCH MG: 20 TABLET, DELAYED RELEASE ORAL at 08:17

## 2019-04-13 RX ADMIN — Medication SCH UNITS: at 14:33

## 2019-04-13 RX ADMIN — HYDROCODONE BITARTRATE AND ACETAMINOPHEN PRN TAB: 5; 325 TABLET ORAL at 05:15

## 2019-04-13 RX ADMIN — IPRATROPIUM BROMIDE AND ALBUTEROL SULFATE SCH ML: .5; 3 SOLUTION RESPIRATORY (INHALATION) at 15:54

## 2019-04-13 RX ADMIN — MORPHINE SULFATE PRN MG: 4 INJECTION INTRAVENOUS at 00:14

## 2019-04-13 RX ADMIN — TIOTROPIUM BROMIDE SCH INHALATION: 18 CAPSULE ORAL; RESPIRATORY (INHALATION) at 11:18

## 2019-04-13 RX ADMIN — FEBUXOSTAT SCH MG: 40 TABLET, COATED ORAL at 08:17

## 2019-04-13 RX ADMIN — HYDROCODONE BITARTRATE AND ACETAMINOPHEN PRN TAB: 5; 325 TABLET ORAL at 12:03

## 2019-04-13 RX ADMIN — IPRATROPIUM BROMIDE AND ALBUTEROL SULFATE SCH ML: .5; 3 SOLUTION RESPIRATORY (INHALATION) at 08:00

## 2019-04-13 RX ADMIN — MORPHINE SULFATE PRN MG: 4 INJECTION INTRAVENOUS at 02:30

## 2019-04-13 RX ADMIN — MEROPENEM AND SODIUM CHLORIDE SCH MLS/HR: 500 INJECTION, SOLUTION INTRAVENOUS at 15:51

## 2019-04-13 RX ADMIN — FLUTICASONE PROPIONATE AND SALMETEROL XINAFOATE SCH PUFF: 230; 21 AEROSOL, METERED RESPIRATORY (INHALATION) at 19:41

## 2019-04-13 RX ADMIN — SODIUM CHLORIDE SCH ML: 9 INJECTION, SOLUTION INTRAMUSCULAR; INTRAVENOUS; SUBCUTANEOUS at 05:15

## 2019-04-13 RX ADMIN — INSULIN LISPRO SCH UNITS: 100 INJECTION, SOLUTION INTRAVENOUS; SUBCUTANEOUS at 20:37

## 2019-04-13 RX ADMIN — PANTOPRAZOLE SODIUM SCH MG: 20 TABLET, DELAYED RELEASE ORAL at 20:34

## 2019-04-13 RX ADMIN — LEVOTHYROXINE SODIUM SCH MCG: 100 TABLET ORAL at 05:14

## 2019-04-13 RX ADMIN — DOCUSATE SODIUM SCH MG: 100 CAPSULE, LIQUID FILLED ORAL at 20:34

## 2019-04-13 RX ADMIN — IPRATROPIUM BROMIDE AND ALBUTEROL SULFATE SCH ML: .5; 3 SOLUTION RESPIRATORY (INHALATION) at 11:22

## 2019-04-13 NOTE — IPNPDOC
Subjective


Date Seen


The patient was seen on 4/13/19.





Subjective


Chief Complaint/HPI


Patient comfortably sitting in chair in no apparent distress. Offers no new 

complaints


General:  Reports: Normal Appetite; 


   Denies: Chills, Night Sweats, Fatigue, Malaise


Constitutional:  Denies: Chills, Fever, Night Sweats


Eyes:  Denies: Pain, Vision change


ENT:  Denies: Head Aches, Ear Pain, Dysphagia


Skin:  Denies: Rash, Lesions, Breakdown


Pulmonary:  Denies: Dyspnea, Cough


Cardiovascular:  Denies: Chest Pain, Palpitations, Orthopnea, Paroxysmal Noc. 

Dyspnea, Lt Headedness


Gastrointestinal:  Denies: Nausea, Vomiting, Abdominal Pain, Diarrhea, 

Constipation


Genitourinary:  Denies: Dysuria, Frequency, Incontinence, Retention


Hematologic:  Denies: Bruising, Bleeding Excessively


Musculoskeletal:  Denies: Neck Pain, Back Pain, Joint Pain, Muscle Pain, Spasms


Neurological:  Denies: Weakness, Numbness, Change in speech, Confusion


Psych:  Reports: Mood Normal; 


   Denies: Depression, Memory Issues





Objective


Physical Examination


General Exam:  Positive: Alert, No Acute Distress


Eye Exam:  Positive: PERRLA, Conjunctiva & lids normal, EOMI; 


   Negative: Sclera icteric


ENT Exam:  Positive: Atraumatic, Mucous membr. moist/pink, Pharynx Normal


Neck Exam:  Positive: Supple; 


   Negative: JVD, thyromegaly


Chest Exam:  Positive: Clear to auscultation, Normal air movement


Heart Exam:  Positive: Rate Normal, Regular Rhythm, Normal S1, Normal S2; 


   Negative: Murmurs, Rubs


Telemetry:  Positive: No significant arrhythmia


Abdomen Exam:  Positive: Normal bowel sounds, Soft; 


   Negative: Tenderness, Hepatospenomegaly


Female  Exam:  Positive: Nl Ext Genitalia; 


   Negative: Lesions, Discharge, Odor, Tenderness


Extremity Exam:  Positive: Normal pulses; 


   Negative: Clubbing, Cyanosis, Edema


Skin Exam:  Positive: Nl turgor and temperature; 


   Negative: Breakdown, Lesion


Neuro Exam:  Positive: Normal Gait, Normal Speech, Cranial Nerves 3-12 NL, 

Reflexes 2+


Psych Exam:  Positive: Mental status NL, Mood NL, Oriented x 3





Assessment /Plan


Problems





(1) Sepsis


Status:  Acute


Response to Treatment:  Uncompensated


Discussed With:  Consultant


Problem Text:  Patient received 2 L of normal saline boluses followed by normal 

saline at 150 mL/h which was later on decreased to 50 mL per hour. Considering 

patient's history for congestive heart failure after enough hydration. Patient's

blood pressure was found to be hypotensive last night and she was started on 

vasopressors by Dr. Carpio.


Patient is off vasopressor right now. Vital signs are stable. IV fluid normal 

saline at 50 mL per hour. Continue meropenem and vancomycin, which patient seems

to be responding very well


Patient is a lactic acid is down to normal limit, now 1.6 


pro Calcitonin is about 40, which indicates bacterial infection


She had a CAT scan done at that out outside facility, which was essentially 

within normal limits at Will order a repeat CT abdomen without contrast secon

cortes to tenderness at the abdomen: Repeat CAT scan of the abdomen is essentially

within normal limits


Urine and blood cultures are negative, so the source of sepsis is still unknown,

but patient seems to be responding to IV antibiotics, and will continue same


WBC count is than normal range today. We will repeat laboratory work in a.m.


Continue cardiac monitoring. Critical care monitoring


 Continue IV antibiotics. Awaiting pending cultures. Reports


Chopped diet with thickened fluid as recommended by speech and swallow


Out of bed as tolerated


Nephrology and critical care consultation. Appreciate


Further, as per Dr. Mallory and Dr. Carpio recommended. Recommendations








Plan/VTE


VTE Prophylaxis Ordered?:  Yes





VS, I&O, 24H, Fishbone


Vital Signs/I&O





Vital Signs








  Date Time  Temp Pulse Resp B/P (MAP) Pulse Ox O2 Delivery O2 Flow Rate FiO2


 


4/13/19 12:03   22  97   


 


4/13/19 12:00 97.2 100  121/58 (79)    


 


4/13/19 09:00       2.0 


 


4/13/19 05:45        35














I&O- Last 24 Hours up to 6 AM 


 


 4/13/19





 06:00


 


Intake Total 1390 ml


 


Output Total 1400 ml


 


Balance -10 ml











Laboratory Data


24H LABS


Laboratory Tests 2


4/12/19 14:22: 


Urine Appearance CLEAR, Urine Color STRAW, Urine pH 6.0, Urine Specific Gravity 

1.005, Urine Protein NEGATIVE, Urine Glucose (UA) NEGATIVE, Urine Ketones 

NEGATIVE, Urine Urobilinogen 0.2, Urine Bilirubin NEGATIVE, Urine Leukocyte 

Esterase 1+H, Urine Blood 2+H, Urine Nitrite NEGATIVE, Urine WBC (Auto) 11H, 

Urine RBC (Auto) 1, Urine Hyaline Casts (Auto) 0, Urine Bacteria (Auto) 1+H, 

Urine Squamous Epithelial Cells 0, Urine Sperm (Auto) 


4/12/19 16:55: Bedside Glucose (Misc Panel) 237H


4/12/19 20:41: Bedside Glucose (Misc Panel) 220H


4/12/19 21:08: Vancomycin Level Trough 15.8


4/13/19 07:35: Bedside Glucose (Misc Panel) 190H


4/13/19 08:22: 


Nucleated Red Blood Cells % (auto) 0.0, Anion Gap 9, Glomerular Filtration Rate 

25.9L, Blood Urea Nitrogen 47H, Creatinine 1.97H, Sodium Level 131L, Potassium 

Level 4.5, Chloride Level 99, Carbon Dioxide Level 23, Calcium Level 7.9L, 

Aspartate Amino Transf (AST/SGOT) 52H, Alanine Aminotransferase (ALT/SGPT) 24, 

Alkaline Phosphatase 130H, Total Bilirubin 0.2, Total Protein 5.0L, Albumin 

2.3L, Magnesium Level 2.0, Albumin/Globulin Ratio 0.85L, Random Vancomycin Level

22.4


4/13/19 11:35: Bedside Glucose (Misc Panel) 184H


CBC/BMP


Laboratory Tests


4/13/19 08:22








Red Blood Count 2.74 L, Mean Corpuscular Volume 86.9, Mean Corpuscular 

Hemoglobin 28.8, Mean Corpuscular Hemoglobin Concent 33.2, Red Cell Distribution

Width 14.9 H, Calcium Level 7.9 L, Aspartate Amino Transf (AST/SGOT) 52 H, 

Alanine Aminotransferase (ALT/SGPT) 24, Alkaline Phosphatase 130 H, Total 

Bilirubin 0.2, Total Protein 5.0 L, Albumin 2.3 L


Microbiology





Microbiology


4/10/19 Blood Culture - Preliminary, Resulted


          No Growth after 48 hours. All Specime...


4/10/19 Blood Culture - Preliminary, Resulted


          No Growth after 48 hours. All Specime...


4/10/19 Urine Culture - Final, Complete











ANNETTE HOUSTON MD              Apr 13, 2019 12:39

## 2019-04-13 NOTE — IPN
DATE:  04/13/2019

 

Mrs. Cordoba is seen this morning on her bedside.  She is sitting in the chair at

the time of my visit.  She is feeling better today and denies any nausea,

vomiting, dyspnea or chest pain.  She is using oxygen via nasal cannula.  She was

diuresed yesterday due to dyspnea and her symptoms improved.  She is being

treated with antibiotics for sepsis.  She also had acute renal failure on

admission, which is gradually improving.

 

PHYSICAL EXAMINATION:

Temperature 97.2 degrees Fahrenheit, heart rate 100 per minute and respiratory

rate 20 per minute.  Blood pressure 121/58 mmHg and oxygen saturation 97%.

Intake and output records show a negative fluid balance of 330 mL over the last

24 hours.  Head is atraumatic.  Neck veins are not abnormally distended while she

is sitting upright in the chair.  Lungs have slightly diminished breath sounds at

bases bilaterally.  Heart sounds are tachycardiac and regular.  Abdomen is soft

and nontender and bowel sounds are normal.  Extremities have no cyanosis or

clubbing.  Neurologically, she is awake and able to answer questions

appropriately.  There is no focal neurological deficit.

 

Today's labs show sodium level 131, potassium 4.5, CO2 23, BUN 47 and creatinine

1.97.  Calcium level is 7.9, total protein 5.0 and albumin 2.3.

 

PROBLEMS:

1.  Acute renal failure superimposed on chronic kidney disease.  Kidney function

is improving nicely and she has no uremic symptoms.  Her urine output has been

adequate.

 

2.  Hyponatremia.  Sodium level is gradually improving and we will continue to

monitor her electrolytes on a daily basis.  She does not need any urgent

intervention.  I will give her one dose of Lasix 40 mg later this afternoon to

maintain slight negative fluid balance.

 

3.  Hypocalcemia.  She was given one dose of IV calcium yesterday and her calcium

level is now corrected.  Her albumin is only 2.3 so her corrected calcium is

within normal range.

 

4.  Metabolic acidosis related to acute renal failure, this has already improved.

There is no need for sodium bicarbonate.

 

5.  Sepsis.  She is currently afebrile and remains on antibiotics.  Cultures have

been negative.

## 2019-04-13 NOTE — PHACANCOPD
PHARMACY VANCOMYCIN DOSING


Pt Demographics


Demographics


Patient Age:79 , Weight:72.100  , Gender: female


Adjusted Body Weight


Date: 4/10/19, Adjusted Body Weight:  Kg





Events Past 24 Hours


Events Past 24 Hours:  YES: Change in CrCl; 


   NO: Dialysis, Diuretic Therapy, Fever, Elevation in WBC, Pending Diagnostics,

Pending Procedures, Other





Vancomycin


Vancomycin indication:  SEPSIS


Vancomycin Load Y/N:  Yes


Load Dose Date Time


Vancomycin Load Dose:   1000MG      Date:  4/10       Time:  430AM AT [facility]


Vancomycin Dose


Date: 4/13/19. Current Vancomycin Dose: [1GM Q24H@12]


Date: 4/10/19. Current Vancomycin Dose: [750MG Q24H@21]


Intermittent Dosing?:  No





Labs


Labs





                                   Vital Signs








Label Value  Date Time


 


Patient Temperature 97.5 degrees F 4/13/19 0800


 


Temperature Source Temporal 4/13/19 0800


 


Patient Temperature 97.2 degrees F 4/13/19 1200


 


Temperature Source Temporal 4/13/19 1200














Item Value  Date Time


 


White Blood Count 16.1 10^3/uL H 4/11/19 0515


 


White Blood Count 12.1 10^3/uL H 4/12/19 0508


 


White Blood Count 6.7 10^3/uL 4/13/19 0822


 


Creatinine 3.24 MG/DL H 4/11/19 2129


 


Creatinine 3.02 MG/DL H 4/12/19 0508


 


Creatinine 1.97 MG/DL H 4/13/19 0822


 


Procalcitonin 39.28 NG/ML 4/11/19 0515








Micro





Microbiology


4/10/19 Blood Culture - Preliminary, Resulted


          No Growth after 72 hours. All specime...


4/10/19 Blood Culture - Preliminary, Resulted


          No Growth after 72 hours. All specime...


4/10/19 Urine Culture - Final, Complete


Creatinine Clearance


Date:4/10/19. Creatinine Clearance: .





Assessment and Plan


Maintaining Current Dose?:  No


Reason for dose change:  Trough too low





Pharmacist Note


Pharmacist Note


4/13:  Today is day 4 of empiric therapy.  Patient's Scr improved from 3.02 

yesterday to 1.97 so a random was obtained this morning which resulted at 22.4. 

This was drawn approximately 12 hours after her last dose.  Given her improved 

kidney function her dose was adjusted to Vancomycin 1gm IV q24h starting at noon

today.  Her WBC is back within normal limits and she is afebrile.  All her micro

remains negative to date.  We will continue to monitor and make adjustments as 

necessary.  





4/11/19: Day #2 empiric vancomycin therapy. A random level was drawn this 

morning which resulted at 18.4mcg/ml, to ensure that the patient was remaining 

within therapeutic levels given poor renal function. We will maintain the 

patient on her current regimen of 750mg IV Q24H @2100, and schedule a follow-up 

trough level when appropriate.





Date: 4/10/19. Pharmacist note:  Patient was transferred to here from [facility] today septic.  She was admitted with a temperature of 104, WBC of 17.2,

and a lactic acid level of 2.2.  Her SCR was elevated at 3.83 upon admission, 

and her SCR on 4/5/19 was 1.1.  She received 1 gram of Vancomycin at [facility] at 430am.  A random level was obtained upon admission here and resulted

at 13.3.  No imaging or H&P has been done yet.  She has blood cultures pending. 

She will be started on Vancomycin 750mg q24h starting tonight at 2100.  We will 

continue to monitor and make adjustments.











[facility]            Apr 13, 2019 14:20

## 2019-04-13 NOTE — REP
Oral chest x-ray:  Single view.

 

History:  Sepsis.

 

Comparison study:  April 12, 2019.

 

Findings:  EKG monitoring electrodes overlie the chest.  Oxygen delivery tubing

is seen.  A left internal jugular central venous line is seen in place with its

tip in the expected location of the superior vena cava.  Cardiomegaly is observed

as before.  Interstitial markings are somewhat prominent.  Discoid atelectasis is

seen in the left base.  Previously noted opacity in the right base is again seen

essentially unchanged.  This may be atelectasis as well.  There is diffuse

osteopenia.  Orthopedic fixation device is visible in the proximal humerus on the

right.

 

Impression:

 

Cardiomegaly.  Bibasilar opacities.  No significant change.

 

 

Electronically Signed by

Michael Manzanares MD 04/13/2019 07:47 A

## 2019-04-14 VITALS — SYSTOLIC BLOOD PRESSURE: 162 MMHG | DIASTOLIC BLOOD PRESSURE: 86 MMHG

## 2019-04-14 VITALS — DIASTOLIC BLOOD PRESSURE: 91 MMHG | SYSTOLIC BLOOD PRESSURE: 132 MMHG

## 2019-04-14 VITALS — SYSTOLIC BLOOD PRESSURE: 141 MMHG | DIASTOLIC BLOOD PRESSURE: 66 MMHG

## 2019-04-14 VITALS — SYSTOLIC BLOOD PRESSURE: 141 MMHG | DIASTOLIC BLOOD PRESSURE: 97 MMHG

## 2019-04-14 VITALS — DIASTOLIC BLOOD PRESSURE: 69 MMHG | SYSTOLIC BLOOD PRESSURE: 139 MMHG

## 2019-04-14 VITALS — SYSTOLIC BLOOD PRESSURE: 132 MMHG | DIASTOLIC BLOOD PRESSURE: 91 MMHG

## 2019-04-14 VITALS — DIASTOLIC BLOOD PRESSURE: 68 MMHG | SYSTOLIC BLOOD PRESSURE: 135 MMHG

## 2019-04-14 VITALS — SYSTOLIC BLOOD PRESSURE: 125 MMHG | DIASTOLIC BLOOD PRESSURE: 59 MMHG

## 2019-04-14 VITALS — SYSTOLIC BLOOD PRESSURE: 137 MMHG | DIASTOLIC BLOOD PRESSURE: 67 MMHG

## 2019-04-14 LAB
ALBUMIN SERPL BCG-MCNC: 2.3 GM/DL (ref 3.2–5.2)
ALT SERPL W P-5'-P-CCNC: 40 U/L (ref 12–78)
BILIRUB SERPL-MCNC: 0.3 MG/DL (ref 0.2–1)
BUN SERPL-MCNC: 46 MG/DL (ref 7–18)
CALCIUM SERPL-MCNC: 8.1 MG/DL (ref 8.8–10.2)
CHLORIDE SERPL-SCNC: 101 MEQ/L (ref 98–107)
CO2 SERPL-SCNC: 22 MEQ/L (ref 21–32)
CREAT SERPL-MCNC: 1.86 MG/DL (ref 0.55–1.3)
GFR SERPL CREATININE-BSD FRML MDRD: 27.7 ML/MIN/{1.73_M2} (ref 32–?)
GLUCOSE SERPL-MCNC: 190 MG/DL (ref 70–100)
HCT VFR BLD AUTO: 23.9 % (ref 36–47)
HGB BLD-MCNC: 7.9 G/DL (ref 12–15.5)
MAGNESIUM SERPL-MCNC: 1.8 MG/DL (ref 1.8–2.4)
MCH RBC QN AUTO: 28.7 PG (ref 27–33)
MCHC RBC AUTO-ENTMCNC: 33.1 G/DL (ref 32–36.5)
MCV RBC AUTO: 86.9 FL (ref 80–96)
PLATELET # BLD AUTO: 212 10^3/UL (ref 150–450)
POTASSIUM SERPL-SCNC: 5.4 MEQ/L (ref 3.5–5.1)
PROT SERPL-MCNC: 5.3 GM/DL (ref 6.4–8.2)
RBC # BLD AUTO: 2.75 10^6/UL (ref 4–5.4)
SODIUM SERPL-SCNC: 131 MEQ/L (ref 136–145)
WBC # BLD AUTO: 6.1 10^3/UL (ref 4–10)

## 2019-04-14 RX ADMIN — INSULIN LISPRO SCH UNITS: 100 INJECTION, SOLUTION INTRAVENOUS; SUBCUTANEOUS at 08:13

## 2019-04-14 RX ADMIN — Medication SCH UNITS: at 05:09

## 2019-04-14 RX ADMIN — SODIUM CHLORIDE SCH ML: 9 INJECTION, SOLUTION INTRAMUSCULAR; INTRAVENOUS; SUBCUTANEOUS at 17:18

## 2019-04-14 RX ADMIN — PANTOPRAZOLE SODIUM SCH MG: 20 TABLET, DELAYED RELEASE ORAL at 20:41

## 2019-04-14 RX ADMIN — IPRATROPIUM BROMIDE AND ALBUTEROL SULFATE SCH ML: .5; 3 SOLUTION RESPIRATORY (INHALATION) at 11:56

## 2019-04-14 RX ADMIN — IPRATROPIUM BROMIDE AND ALBUTEROL SULFATE SCH ML: .5; 3 SOLUTION RESPIRATORY (INHALATION) at 20:32

## 2019-04-14 RX ADMIN — TIOTROPIUM BROMIDE SCH INHALATION: 18 CAPSULE ORAL; RESPIRATORY (INHALATION) at 08:41

## 2019-04-14 RX ADMIN — MEROPENEM AND SODIUM CHLORIDE SCH MLS/HR: 500 INJECTION, SOLUTION INTRAVENOUS at 03:50

## 2019-04-14 RX ADMIN — SODIUM CHLORIDE SCH ML: 9 INJECTION, SOLUTION INTRAMUSCULAR; INTRAVENOUS; SUBCUTANEOUS at 20:41

## 2019-04-14 RX ADMIN — DEXTROSE MONOHYDRATE SCH MLS/HR: 50 INJECTION, SOLUTION INTRAVENOUS at 12:40

## 2019-04-14 RX ADMIN — SODIUM CHLORIDE SCH ML: 9 INJECTION, SOLUTION INTRAMUSCULAR; INTRAVENOUS; SUBCUTANEOUS at 05:09

## 2019-04-14 RX ADMIN — MORPHINE SULFATE PRN MG: 4 INJECTION INTRAVENOUS at 19:53

## 2019-04-14 RX ADMIN — IPRATROPIUM BROMIDE AND ALBUTEROL SULFATE PRN ML: .5; 3 SOLUTION RESPIRATORY (INHALATION) at 05:29

## 2019-04-14 RX ADMIN — ASPIRIN SCH MG: 81 TABLET ORAL at 08:10

## 2019-04-14 RX ADMIN — INSULIN LISPRO SCH UNITS: 100 INJECTION, SOLUTION INTRAVENOUS; SUBCUTANEOUS at 20:49

## 2019-04-14 RX ADMIN — LEVOTHYROXINE SODIUM SCH MCG: 100 TABLET ORAL at 05:09

## 2019-04-14 RX ADMIN — FLUTICASONE PROPIONATE AND SALMETEROL XINAFOATE SCH PUFF: 230; 21 AEROSOL, METERED RESPIRATORY (INHALATION) at 08:41

## 2019-04-14 RX ADMIN — MORPHINE SULFATE PRN MG: 4 INJECTION INTRAVENOUS at 23:46

## 2019-04-14 RX ADMIN — MORPHINE SULFATE PRN MG: 4 INJECTION INTRAVENOUS at 02:06

## 2019-04-14 RX ADMIN — IPRATROPIUM BROMIDE AND ALBUTEROL SULFATE SCH ML: .5; 3 SOLUTION RESPIRATORY (INHALATION) at 08:00

## 2019-04-14 RX ADMIN — IPRATROPIUM BROMIDE AND ALBUTEROL SULFATE SCH ML: .5; 3 SOLUTION RESPIRATORY (INHALATION) at 04:00

## 2019-04-14 RX ADMIN — ATORVASTATIN CALCIUM SCH MG: 20 TABLET, FILM COATED ORAL at 08:10

## 2019-04-14 RX ADMIN — SODIUM CHLORIDE SCH UNITS: 4.5 INJECTION, SOLUTION INTRAVENOUS at 08:10

## 2019-04-14 RX ADMIN — INSULIN LISPRO SCH UNITS: 100 INJECTION, SOLUTION INTRAVENOUS; SUBCUTANEOUS at 17:18

## 2019-04-14 RX ADMIN — FLUTICASONE PROPIONATE AND SALMETEROL XINAFOATE SCH PUFF: 230; 21 AEROSOL, METERED RESPIRATORY (INHALATION) at 20:32

## 2019-04-14 RX ADMIN — Medication SCH UNITS: at 20:41

## 2019-04-14 RX ADMIN — HYDROCODONE BITARTRATE AND ACETAMINOPHEN PRN TAB: 5; 325 TABLET ORAL at 05:15

## 2019-04-14 RX ADMIN — SODIUM CHLORIDE SCH UNITS: 4.5 INJECTION, SOLUTION INTRAVENOUS at 20:40

## 2019-04-14 RX ADMIN — IPRATROPIUM BROMIDE AND ALBUTEROL SULFATE SCH ML: .5; 3 SOLUTION RESPIRATORY (INHALATION) at 15:25

## 2019-04-14 RX ADMIN — PANTOPRAZOLE SODIUM SCH MG: 20 TABLET, DELAYED RELEASE ORAL at 08:10

## 2019-04-14 RX ADMIN — DOCUSATE SODIUM SCH MG: 100 CAPSULE, LIQUID FILLED ORAL at 20:40

## 2019-04-14 RX ADMIN — FEBUXOSTAT SCH MG: 40 TABLET, COATED ORAL at 08:10

## 2019-04-14 RX ADMIN — DOCUSATE SODIUM SCH MG: 100 CAPSULE, LIQUID FILLED ORAL at 08:10

## 2019-04-14 RX ADMIN — INSULIN LISPRO SCH UNITS: 100 INJECTION, SOLUTION INTRAVENOUS; SUBCUTANEOUS at 12:40

## 2019-04-14 RX ADMIN — HYDROCODONE BITARTRATE AND ACETAMINOPHEN PRN TAB: 5; 325 TABLET ORAL at 18:54

## 2019-04-14 RX ADMIN — MEROPENEM AND SODIUM CHLORIDE SCH MLS/HR: 500 INJECTION, SOLUTION INTRAVENOUS at 17:18

## 2019-04-14 RX ADMIN — Medication SCH UNITS: at 17:18

## 2019-04-14 NOTE — IPN
DATE: 04/14/2019

 

Mrs. James is seen this morning on her bedside.  She denies any new complaints.

She is laying in the bed without any acute distress.  She has no nausea,

vomiting, fever or chills.

 

PHYSICAL EXAMINATION: Temperature 97.4 degrees Fahrenheit, heart rate 116 per

minute and respiratory rate 20 per minute.  Blood pressure 132/90 mmHg and oxygen

saturation 94%.  Intake and output records from yesterday showed total intake

1153 and output 1150.  Her head is atraumatic.  Neck is supple and JVD is not

abnormally elevated.  Heart:  Sounds are tachycardiac and irregular.  Lungs:

Have slightly diminished air entry but no wheezing or rales.  Abdomen is soft and

nontender and bowel sounds are normal.  Extremities have no cyanosis or clubbing.

Neurologically she seems to be grossly intact.

 

Today's labs show WBC count 6.1, hemoglobin 7.9 and hematocrit 23.9.  Platelets

212.  Sodium 131, potassium 5.4, CO2 22, BUN 46 and creatinine 1.86.

 

PROBLEMS:

1. Acute renal failure superimposed on chronic kidney disease.  Kidney function

is in gradually improving and we will continue to watch closely.

 

2. Hyperkalemia.  This is new and she is not receiving any potassium supplement,

ACE inhibitor or angiotensin receptor blocker.  There could be slight hemolysis

in the blood.  We will recheck her potassium tomorrow morning.

 

3. Hyponatremia.  Her sodium level improved from 127 on 11th however, there is no

improvement over last 24 hours.  We will continue to watch closely.

 

4. Anemia.  She does have significant anemia but no change over last 24 hours.

We will consider to transfuse her if her anemia gets any worse.  CBC will be

checked again tomorrow morning.

## 2019-04-14 NOTE — IPNPDOC
Subjective


Date Seen


The patient was seen on 4/14/19.





Subjective


Chief Complaint/HPI


Patient sitting on side of her bed in the breakfast offers no new complaints


General:  Reports: Normal Appetite; 


   Denies: Chills, Night Sweats, Fatigue, Malaise


Constitutional:  Denies: Chills, Fever, Night Sweats


Eyes:  Denies: Pain, Vision change


ENT:  Denies: Head Aches, Ear Pain, Dysphagia


Skin:  Denies: Rash, Lesions, Breakdown


Pulmonary:  Denies: Dyspnea, Cough


Cardiovascular:  Denies: Chest Pain, Palpitations, Orthopnea, Paroxysmal Noc. 

Dyspnea, Lt Headedness


Gastrointestinal:  Denies: Nausea, Vomiting, Abdominal Pain, Diarrhea, 

Constipation


Genitourinary:  Denies: Dysuria, Frequency, Incontinence, Retention


Hematologic:  Denies: Bruising, Bleeding Excessively


Musculoskeletal:  Denies: Neck Pain, Back Pain, Joint Pain, Muscle Pain, Spasms


Neurological:  Denies: Weakness, Numbness, Change in speech, Confusion


Psych:  Reports: Mood Normal; 


   Denies: Depression, Memory Issues





Objective


Physical Examination


General Exam:  Positive: Alert, No Acute Distress


Eye Exam:  Positive: PERRLA, Conjunctiva & lids normal, EOMI; 


   Negative: Sclera icteric


ENT Exam:  Positive: Atraumatic, Mucous membr. moist/pink, Pharynx Normal


Neck Exam:  Positive: Supple; 


   Negative: JVD, thyromegaly


Chest Exam:  Positive: Clear to auscultation, Normal air movement


Heart Exam:  Positive: Rate Normal, Regular Rhythm, Normal S1, Normal S2; 


   Negative: Murmurs, Rubs


Telemetry:  Positive: No significant arrhythmia


Abdomen Exam:  Positive: Normal bowel sounds, Soft; 


   Negative: Tenderness, Hepatospenomegaly


Female  Exam:  Positive: Nl Ext Genitalia; 


   Negative: Lesions, Discharge, Odor, Tenderness


Extremity Exam:  Positive: Normal pulses; 


   Negative: Clubbing, Cyanosis, Edema


Skin Exam:  Positive: Nl turgor and temperature; 


   Negative: Breakdown, Lesion


Neuro Exam:  Positive: Normal Gait, Normal Speech, Cranial Nerves 3-12 NL, 

Reflexes 2+


Psych Exam:  Positive: Mental status NL, Mood NL, Oriented x 3





Assessment /Plan


Problems





(1) Sepsis


Status:  Acute


Response to Treatment:  Uncompensated


Discussed With:  Consultant


Problem Text:  Patient received 2 L of normal saline boluses followed by normal 

saline at 150 mL/h which was later on decreased to 50 mL per hour. Considering 

patient's history for congestive heart failure after enough hydration. Patient's

blood pressure was found to be hypotensive last night and she was started on 

vasopressors by Dr. Carpio.


Patient is off vasopressor right now. Vital signs are stable. IV fluid normal 

saline at 50 mL per hour. Continue meropenem and vancomycin, which patient seems

to be responding very well


Patient is a lactic acid is down to normal limit, now 1.6 


pro Calcitonin is about 40, which indicates bacterial infection


She had a CAT scan done at that out outside facility, which was essentially 

within normal limits at Will order a repeat CT abdomen without contrast 

secondary to tenderness at the abdomen: Repeat CAT scan of the abdomen is 

essentially within normal limits


Urine and blood cultures are negative, so the source of sepsis is still unknown,

but patient seems to be responding to IV antibiotics, and will continue same


WBC count is than normal range today. We will repeat laboratory work in a.m.


Continue cardiac monitoring. Critical care monitoring


 Continue IV antibiotics. Awaiting pending cultures. Reports


Chopped diet with thickened fluid as recommended by speech and swallow


Out of bed as tolerated


This is day 5 of IV antibiotics. We will finish the course for 7 days and then 

patient can be discharged home. His cultures are all negative so far


Patient's nephrostomy tube is functioning very well


Nephrology and critical care consultation on the case


 








Plan/VTE


VTE Prophylaxis Ordered?:  Yes





VS, I&O, 24H, Fishbone


Vital Signs/I&O





Vital Signs








  Date Time  Temp Pulse Resp B/P (MAP) Pulse Ox O2 Delivery O2 Flow Rate FiO2


 


4/14/19 08:00 97.4 62 20 132/91 (105) 94   


 


4/13/19 09:00       2.0 


 


4/13/19 05:45        35














I&O- Last 24 Hours up to 6 AM 


 


 4/14/19





 06:00


 


Intake Total 1033 ml


 


Output Total 975 ml


 


Balance 58 ml











Laboratory Data


24H LABS


Laboratory Tests 2


4/13/19 11:35: Bedside Glucose (Misc Panel) 184H


4/13/19 17:09: Bedside Glucose (Misc Panel) 258H


4/13/19 20:37: Bedside Glucose (Misc Panel) 236H


4/14/19 04:58: 


Nucleated Red Blood Cells % (auto) 0.0, Anion Gap 8, Glomerular Filtration Rate 

27.7L, Blood Urea Nitrogen 46H, Creatinine 1.86H, Sodium Level 131L, Potassium 

Level 5.4H, Chloride Level 101, Carbon Dioxide Level 22, Calcium Level 8.1L, 

Aspartate Amino Transf (AST/SGOT) 72H, Alanine Aminotransferase (ALT/SGPT) 40, 

Alkaline Phosphatase 151H, Total Bilirubin 0.3, Total Protein 5.3L, Albumin 

2.3L, Magnesium Level 1.8, Albumin/Globulin Ratio 0.77L


CBC/BMP


Laboratory Tests


4/14/19 04:58








Red Blood Count 2.75 L, Mean Corpuscular Volume 86.9, Mean Corpuscular 

Hemoglobin 28.7, Mean Corpuscular Hemoglobin Concent 33.1, Red Cell Distribution

Width 15.0 H, Calcium Level 8.1 L, Aspartate Amino Transf (AST/SGOT) 72 H, 

Alanine Aminotransferase (ALT/SGPT) 40, Alkaline Phosphatase 151 H, Total 

Bilirubin 0.3, Total Protein 5.3 L, Albumin 2.3 L


Microbiology





Microbiology


4/10/19 Blood Culture - Preliminary, Resulted


          No Growth after 72 hours. All specime...


4/10/19 Blood Culture - Preliminary, Resulted


          No Growth after 72 hours. All specime...


4/10/19 Urine Culture - Final, Complete











ANNETTE HOUSTON MD              Apr 14, 2019 10:32

## 2019-04-15 VITALS — SYSTOLIC BLOOD PRESSURE: 144 MMHG | DIASTOLIC BLOOD PRESSURE: 81 MMHG

## 2019-04-15 VITALS — DIASTOLIC BLOOD PRESSURE: 78 MMHG | SYSTOLIC BLOOD PRESSURE: 122 MMHG

## 2019-04-15 VITALS — DIASTOLIC BLOOD PRESSURE: 74 MMHG | SYSTOLIC BLOOD PRESSURE: 159 MMHG

## 2019-04-15 VITALS — DIASTOLIC BLOOD PRESSURE: 68 MMHG | SYSTOLIC BLOOD PRESSURE: 147 MMHG

## 2019-04-15 VITALS — DIASTOLIC BLOOD PRESSURE: 56 MMHG | SYSTOLIC BLOOD PRESSURE: 138 MMHG

## 2019-04-15 VITALS — SYSTOLIC BLOOD PRESSURE: 150 MMHG | DIASTOLIC BLOOD PRESSURE: 78 MMHG

## 2019-04-15 LAB
ALBUMIN SERPL BCG-MCNC: 2.4 GM/DL (ref 3.2–5.2)
ALT SERPL W P-5'-P-CCNC: 35 U/L (ref 12–78)
BILIRUB SERPL-MCNC: 0.4 MG/DL (ref 0.2–1)
BUN SERPL-MCNC: 43 MG/DL (ref 7–18)
CALCIUM SERPL-MCNC: 8.4 MG/DL (ref 8.8–10.2)
CHLORIDE SERPL-SCNC: 106 MEQ/L (ref 98–107)
CO2 SERPL-SCNC: 24 MEQ/L (ref 21–32)
CREAT SERPL-MCNC: 1.51 MG/DL (ref 0.55–1.3)
GFR SERPL CREATININE-BSD FRML MDRD: 35.2 ML/MIN/{1.73_M2} (ref 32–?)
GLUCOSE SERPL-MCNC: 115 MG/DL (ref 70–100)
HCT VFR BLD AUTO: 25.8 % (ref 36–47)
HGB BLD-MCNC: 8.5 G/DL (ref 12–15.5)
MCH RBC QN AUTO: 28.7 PG (ref 27–33)
MCHC RBC AUTO-ENTMCNC: 32.9 G/DL (ref 32–36.5)
MCV RBC AUTO: 87.2 FL (ref 80–96)
PLATELET # BLD AUTO: 245 10^3/UL (ref 150–450)
POTASSIUM SERPL-SCNC: 5.5 MEQ/L (ref 3.5–5.1)
PROT SERPL-MCNC: 5.1 GM/DL (ref 6.4–8.2)
RBC # BLD AUTO: 2.96 10^6/UL (ref 4–5.4)
SODIUM SERPL-SCNC: 135 MEQ/L (ref 136–145)
WBC # BLD AUTO: 8.7 10^3/UL (ref 4–10)

## 2019-04-15 RX ADMIN — LEVOTHYROXINE SODIUM SCH MCG: 100 TABLET ORAL at 05:16

## 2019-04-15 RX ADMIN — MORPHINE SULFATE PRN MG: 4 INJECTION INTRAVENOUS at 02:37

## 2019-04-15 RX ADMIN — ATORVASTATIN CALCIUM SCH MG: 20 TABLET, FILM COATED ORAL at 08:47

## 2019-04-15 RX ADMIN — INSULIN LISPRO SCH UNITS: 100 INJECTION, SOLUTION INTRAVENOUS; SUBCUTANEOUS at 12:40

## 2019-04-15 RX ADMIN — DOCUSATE SODIUM SCH MG: 100 CAPSULE, LIQUID FILLED ORAL at 08:47

## 2019-04-15 RX ADMIN — IPRATROPIUM BROMIDE AND ALBUTEROL SULFATE SCH ML: .5; 3 SOLUTION RESPIRATORY (INHALATION) at 00:00

## 2019-04-15 RX ADMIN — MORPHINE SULFATE PRN MG: 4 INJECTION INTRAVENOUS at 12:39

## 2019-04-15 RX ADMIN — IPRATROPIUM BROMIDE AND ALBUTEROL SULFATE SCH ML: .5; 3 SOLUTION RESPIRATORY (INHALATION) at 07:44

## 2019-04-15 RX ADMIN — PANTOPRAZOLE SODIUM SCH MG: 20 TABLET, DELAYED RELEASE ORAL at 21:19

## 2019-04-15 RX ADMIN — DOCUSATE SODIUM SCH MG: 100 CAPSULE, LIQUID FILLED ORAL at 21:19

## 2019-04-15 RX ADMIN — INSULIN LISPRO SCH UNITS: 100 INJECTION, SOLUTION INTRAVENOUS; SUBCUTANEOUS at 08:48

## 2019-04-15 RX ADMIN — MEROPENEM AND SODIUM CHLORIDE SCH MLS/HR: 500 INJECTION, SOLUTION INTRAVENOUS at 03:37

## 2019-04-15 RX ADMIN — FLUTICASONE PROPIONATE AND SALMETEROL XINAFOATE SCH PUFF: 230; 21 AEROSOL, METERED RESPIRATORY (INHALATION) at 20:16

## 2019-04-15 RX ADMIN — SODIUM CHLORIDE SCH ML: 9 INJECTION, SOLUTION INTRAMUSCULAR; INTRAVENOUS; SUBCUTANEOUS at 12:39

## 2019-04-15 RX ADMIN — INSULIN LISPRO SCH UNITS: 100 INJECTION, SOLUTION INTRAVENOUS; SUBCUTANEOUS at 20:02

## 2019-04-15 RX ADMIN — HYDROCODONE BITARTRATE AND ACETAMINOPHEN PRN TAB: 5; 325 TABLET ORAL at 15:16

## 2019-04-15 RX ADMIN — HYDROCODONE BITARTRATE AND ACETAMINOPHEN PRN TAB: 5; 325 TABLET ORAL at 23:46

## 2019-04-15 RX ADMIN — FEBUXOSTAT SCH MG: 40 TABLET, COATED ORAL at 08:47

## 2019-04-15 RX ADMIN — Medication SCH UNITS: at 12:38

## 2019-04-15 RX ADMIN — MORPHINE SULFATE PRN MG: 4 INJECTION INTRAVENOUS at 06:54

## 2019-04-15 RX ADMIN — TIOTROPIUM BROMIDE SCH INHALATION: 18 CAPSULE ORAL; RESPIRATORY (INHALATION) at 07:44

## 2019-04-15 RX ADMIN — SODIUM CHLORIDE SCH UNITS: 4.5 INJECTION, SOLUTION INTRAVENOUS at 08:48

## 2019-04-15 RX ADMIN — Medication SCH UNITS: at 05:16

## 2019-04-15 RX ADMIN — PANTOPRAZOLE SODIUM SCH MG: 20 TABLET, DELAYED RELEASE ORAL at 08:47

## 2019-04-15 RX ADMIN — IPRATROPIUM BROMIDE AND ALBUTEROL SULFATE SCH ML: .5; 3 SOLUTION RESPIRATORY (INHALATION) at 15:17

## 2019-04-15 RX ADMIN — IPRATROPIUM BROMIDE AND ALBUTEROL SULFATE SCH ML: .5; 3 SOLUTION RESPIRATORY (INHALATION) at 02:41

## 2019-04-15 RX ADMIN — ASPIRIN SCH MG: 81 TABLET ORAL at 08:48

## 2019-04-15 RX ADMIN — IPRATROPIUM BROMIDE AND ALBUTEROL SULFATE SCH ML: .5; 3 SOLUTION RESPIRATORY (INHALATION) at 20:00

## 2019-04-15 RX ADMIN — SODIUM CHLORIDE SCH ML: 9 INJECTION, SOLUTION INTRAMUSCULAR; INTRAVENOUS; SUBCUTANEOUS at 21:18

## 2019-04-15 RX ADMIN — Medication SCH UNITS: at 21:18

## 2019-04-15 RX ADMIN — DEXTROSE MONOHYDRATE SCH MLS/HR: 50 INJECTION, SOLUTION INTRAVENOUS at 12:38

## 2019-04-15 RX ADMIN — SODIUM CHLORIDE SCH ML: 9 INJECTION, SOLUTION INTRAMUSCULAR; INTRAVENOUS; SUBCUTANEOUS at 05:16

## 2019-04-15 RX ADMIN — IPRATROPIUM BROMIDE AND ALBUTEROL SULFATE SCH ML: .5; 3 SOLUTION RESPIRATORY (INHALATION) at 23:43

## 2019-04-15 RX ADMIN — IPRATROPIUM BROMIDE AND ALBUTEROL SULFATE SCH ML: .5; 3 SOLUTION RESPIRATORY (INHALATION) at 11:16

## 2019-04-15 RX ADMIN — INSULIN LISPRO SCH UNITS: 100 INJECTION, SOLUTION INTRAVENOUS; SUBCUTANEOUS at 16:57

## 2019-04-15 RX ADMIN — FLUTICASONE PROPIONATE AND SALMETEROL XINAFOATE SCH PUFF: 230; 21 AEROSOL, METERED RESPIRATORY (INHALATION) at 07:44

## 2019-04-15 RX ADMIN — SODIUM CHLORIDE SCH UNITS: 4.5 INJECTION, SOLUTION INTRAVENOUS at 21:19

## 2019-04-15 RX ADMIN — MEROPENEM AND SODIUM CHLORIDE SCH MLS/HR: 500 INJECTION, SOLUTION INTRAVENOUS at 16:57

## 2019-04-15 RX ADMIN — MORPHINE SULFATE PRN MG: 4 INJECTION INTRAVENOUS at 19:03

## 2019-04-15 NOTE — NUR
Recommend please upgrade to thin liquids, continue level 2 solids.

-------------------------------------------------------------------------------

Addendum: 04/15/19 at 1530 by MUNIRA TSEWART [facility]

-------------------------------------------------------------------------------

Amended: Links added.

## 2019-04-15 NOTE — REP
Portable chest, 06:37 a.m., single AP upright view:

 

Comparison is 04/14/2019.

 

There is diffuse interstitial coarsening, not significantly changed.

There are no focal infiltrates.

The cardiac size appears mildly enlarged although there is magnification from

portable positioning.

The mohini, mediastinum, skeletal structures are unremarkable.

There is a left IJ central venous catheter with the tip in the superior vena

cava, unchanged.

Impression:

No significant interval change.

 

 

Electronically Signed by

Bennie Murrell MD 04/15/2019 07:55 A

## 2019-04-15 NOTE — IPN
DATE OF VISIT: 04/15/2019

 

Mrs. Cordoba is seen this morning on her bedside.  She is sitting in the chair at

the time of my visit and reports feeling tired.  She is also short of breath and

currently using oxygen via nasal cannula.  She denies any fever, chills, nausea

or vomiting.

 

PHYSICAL EXAMINATION:

Temperature 97.7 degrees Fahrenheit, heart rate 106 per minute and respiratory

rate 18 per minute.  Blood pressure 150/78 mmHg and oxygen saturation 98% on 2

liters oxygen.  Head is atraumatic.  Neck is supple and without any visible

jugular venous distention (JVD) while sitting upright.  Lungs:  Have diminished

breath sounds and bibasilar rales.  Heart sounds are irregular and tachycardiac.

There is no pericardial friction rub.  Abdomen soft and nontender and bowel

sounds are normal.  Extremities have no cyanosis or clubbing.  Lower extremity

edema is at least 2+ bilaterally.  Neurologically she is awake, alert and able to

answer questions.

 

Today's labs show WBC count 8.7, hemoglobin 8.5 and hematocrit 25.8.  Sodium 135,

potassium 5.5, CO2 24, BUN 43 and creatinine 1.51.

 

PROBLEMS:

1.  Acute renal failure superimposed on chronic kidney disease.  Kidney function

is improving nicely and she has no uremic symptoms.  We will recheck her renal

profile tomorrow morning.

 

2.  Congestive heart failure.  Her volume status remains decompensated and she is

given one dose of Lasix 60 mg this morning and another dose of 60 mg will be

given this afternoon.  We will try to get her in a negative fluid balance of

about 2 liters today.

 

3.  Hyperkalemia.  Her potassium level is gradually worsening and she is

currently not on any potassium supplement or potassium-sparing diuretic.  She

will be given one dose of Veltassa 8.4 grams today and we are also trying to

diurese her which will help to correct her hyperkalemia.  Electrolytes will be

checked tomorrow morning.

 

4.  Anemia.  Her anemia is improving slightly and at this point there is no

emergent indication for a transfusion.

 

5.  Hyponatremia.  Sodium level improved to 135 today and is likely to improve

further with diuresis.  No other intervention is indicated.

## 2019-04-15 NOTE — REP
Portable chest x-ray:  Single view.

 

History:  Sepsis.

 

Comparison chest x-ray:  April 13, 2019.

 

Findings:  A left internal jugular central venous catheter remains in place as

tip in the expected location of the superior vena cava.  EKG electrodes are seen.

Cardiomegaly is again observed unchanged. Interstitial markings are diffusely

increased.  These are somewhat more prominent than on the recent prior studies.

This raises a question of some interstitial edema.  Minimal bibasilar plate-like

atelectasis is seen although these densities have improved.  Cardiomegaly is

observed.  The aorta is calcific and tortuous as before.

 

Impression:

 

Increasing interstitial markings diffusely question edema.  Improved bibasilar

discoid atelectasis.

 

 

Electronically Signed by

Michael Manzanares MD 04/14/2019 07:50 A

## 2019-04-16 VITALS — DIASTOLIC BLOOD PRESSURE: 81 MMHG | SYSTOLIC BLOOD PRESSURE: 145 MMHG

## 2019-04-16 VITALS — SYSTOLIC BLOOD PRESSURE: 166 MMHG | DIASTOLIC BLOOD PRESSURE: 76 MMHG

## 2019-04-16 VITALS — DIASTOLIC BLOOD PRESSURE: 72 MMHG | SYSTOLIC BLOOD PRESSURE: 150 MMHG

## 2019-04-16 VITALS — DIASTOLIC BLOOD PRESSURE: 71 MMHG | SYSTOLIC BLOOD PRESSURE: 141 MMHG

## 2019-04-16 VITALS — DIASTOLIC BLOOD PRESSURE: 77 MMHG | SYSTOLIC BLOOD PRESSURE: 143 MMHG

## 2019-04-16 VITALS — DIASTOLIC BLOOD PRESSURE: 69 MMHG | SYSTOLIC BLOOD PRESSURE: 154 MMHG

## 2019-04-16 LAB
BUN SERPL-MCNC: 36 MG/DL (ref 7–18)
CALCIUM SERPL-MCNC: 8.3 MG/DL (ref 8.8–10.2)
CHLORIDE SERPL-SCNC: 103 MEQ/L (ref 98–107)
CO2 SERPL-SCNC: 26 MEQ/L (ref 21–32)
CREAT SERPL-MCNC: 1.28 MG/DL (ref 0.55–1.3)
GFR SERPL CREATININE-BSD FRML MDRD: 42.6 ML/MIN/{1.73_M2} (ref 32–?)
GLUCOSE SERPL-MCNC: 132 MG/DL (ref 70–100)
HCT VFR BLD AUTO: 24.9 % (ref 36–47)
HGB BLD-MCNC: 8.1 G/DL (ref 12–15.5)
MCH RBC QN AUTO: 28.5 PG (ref 27–33)
MCHC RBC AUTO-ENTMCNC: 32.5 G/DL (ref 32–36.5)
MCV RBC AUTO: 87.7 FL (ref 80–96)
PLATELET # BLD AUTO: 254 10^3/UL (ref 150–450)
POTASSIUM SERPL-SCNC: 5 MEQ/L (ref 3.5–5.1)
RBC # BLD AUTO: 2.84 10^6/UL (ref 4–5.4)
SODIUM SERPL-SCNC: 134 MEQ/L (ref 136–145)
WBC # BLD AUTO: 6.9 10^3/UL (ref 4–10)

## 2019-04-16 RX ADMIN — DOCUSATE SODIUM SCH MG: 100 CAPSULE, LIQUID FILLED ORAL at 07:41

## 2019-04-16 RX ADMIN — SODIUM CHLORIDE SCH ML: 9 INJECTION, SOLUTION INTRAMUSCULAR; INTRAVENOUS; SUBCUTANEOUS at 20:51

## 2019-04-16 RX ADMIN — PANTOPRAZOLE SODIUM SCH MG: 20 TABLET, DELAYED RELEASE ORAL at 07:40

## 2019-04-16 RX ADMIN — MEROPENEM AND SODIUM CHLORIDE SCH MLS/HR: 500 INJECTION, SOLUTION INTRAVENOUS at 15:32

## 2019-04-16 RX ADMIN — SODIUM CHLORIDE SCH UNITS: 4.5 INJECTION, SOLUTION INTRAVENOUS at 07:40

## 2019-04-16 RX ADMIN — INSULIN LISPRO SCH UNITS: 100 INJECTION, SOLUTION INTRAVENOUS; SUBCUTANEOUS at 12:14

## 2019-04-16 RX ADMIN — ATORVASTATIN CALCIUM SCH MG: 20 TABLET, FILM COATED ORAL at 07:41

## 2019-04-16 RX ADMIN — DOCUSATE SODIUM SCH MG: 100 CAPSULE, LIQUID FILLED ORAL at 20:52

## 2019-04-16 RX ADMIN — INSULIN LISPRO SCH UNITS: 100 INJECTION, SOLUTION INTRAVENOUS; SUBCUTANEOUS at 20:51

## 2019-04-16 RX ADMIN — ASPIRIN SCH MG: 81 TABLET ORAL at 07:41

## 2019-04-16 RX ADMIN — IPRATROPIUM BROMIDE AND ALBUTEROL SULFATE SCH ML: .5; 3 SOLUTION RESPIRATORY (INHALATION) at 11:28

## 2019-04-16 RX ADMIN — SODIUM CHLORIDE SCH ML: 9 INJECTION, SOLUTION INTRAMUSCULAR; INTRAVENOUS; SUBCUTANEOUS at 04:18

## 2019-04-16 RX ADMIN — SODIUM CHLORIDE SCH UNITS: 4.5 INJECTION, SOLUTION INTRAVENOUS at 20:52

## 2019-04-16 RX ADMIN — HYDROCODONE BITARTRATE AND ACETAMINOPHEN PRN TAB: 5; 325 TABLET ORAL at 17:36

## 2019-04-16 RX ADMIN — HYDROCODONE BITARTRATE AND ACETAMINOPHEN PRN TAB: 5; 325 TABLET ORAL at 23:57

## 2019-04-16 RX ADMIN — INSULIN LISPRO SCH UNITS: 100 INJECTION, SOLUTION INTRAVENOUS; SUBCUTANEOUS at 17:35

## 2019-04-16 RX ADMIN — FLUTICASONE PROPIONATE AND SALMETEROL XINAFOATE SCH PUFF: 230; 21 AEROSOL, METERED RESPIRATORY (INHALATION) at 09:05

## 2019-04-16 RX ADMIN — TIOTROPIUM BROMIDE SCH INHALATION: 18 CAPSULE ORAL; RESPIRATORY (INHALATION) at 09:05

## 2019-04-16 RX ADMIN — METOPROLOL SUCCINATE SCH MG: 25 TABLET, EXTENDED RELEASE ORAL at 18:14

## 2019-04-16 RX ADMIN — IPRATROPIUM BROMIDE AND ALBUTEROL SULFATE SCH ML: .5; 3 SOLUTION RESPIRATORY (INHALATION) at 20:00

## 2019-04-16 RX ADMIN — MEROPENEM AND SODIUM CHLORIDE SCH MLS/HR: 500 INJECTION, SOLUTION INTRAVENOUS at 04:00

## 2019-04-16 RX ADMIN — IPRATROPIUM BROMIDE AND ALBUTEROL SULFATE SCH ML: .5; 3 SOLUTION RESPIRATORY (INHALATION) at 03:56

## 2019-04-16 RX ADMIN — Medication SCH UNITS: at 04:18

## 2019-04-16 RX ADMIN — INSULIN LISPRO SCH UNITS: 100 INJECTION, SOLUTION INTRAVENOUS; SUBCUTANEOUS at 07:40

## 2019-04-16 RX ADMIN — IPRATROPIUM BROMIDE AND ALBUTEROL SULFATE SCH ML: .5; 3 SOLUTION RESPIRATORY (INHALATION) at 08:00

## 2019-04-16 RX ADMIN — FLUTICASONE PROPIONATE AND SALMETEROL XINAFOATE SCH PUFF: 230; 21 AEROSOL, METERED RESPIRATORY (INHALATION) at 20:46

## 2019-04-16 RX ADMIN — IPRATROPIUM BROMIDE AND ALBUTEROL SULFATE SCH ML: .5; 3 SOLUTION RESPIRATORY (INHALATION) at 15:23

## 2019-04-16 RX ADMIN — PANTOPRAZOLE SODIUM SCH MG: 20 TABLET, DELAYED RELEASE ORAL at 20:52

## 2019-04-16 RX ADMIN — IPRATROPIUM BROMIDE AND ALBUTEROL SULFATE SCH ML: .5; 3 SOLUTION RESPIRATORY (INHALATION) at 23:39

## 2019-04-16 RX ADMIN — SODIUM CHLORIDE SCH ML: 9 INJECTION, SOLUTION INTRAMUSCULAR; INTRAVENOUS; SUBCUTANEOUS at 14:00

## 2019-04-16 RX ADMIN — Medication SCH UNITS: at 15:32

## 2019-04-16 RX ADMIN — HYDROCODONE BITARTRATE AND ACETAMINOPHEN PRN TAB: 5; 325 TABLET ORAL at 04:01

## 2019-04-16 RX ADMIN — HYDROCODONE BITARTRATE AND ACETAMINOPHEN PRN TAB: 5; 325 TABLET ORAL at 12:05

## 2019-04-16 RX ADMIN — DEXTROSE MONOHYDRATE SCH MLS/HR: 50 INJECTION, SOLUTION INTRAVENOUS at 12:06

## 2019-04-16 RX ADMIN — LEVOTHYROXINE SODIUM SCH MCG: 100 TABLET ORAL at 06:01

## 2019-04-16 RX ADMIN — FEBUXOSTAT SCH MG: 40 TABLET, COATED ORAL at 07:40

## 2019-04-16 RX ADMIN — Medication SCH UNITS: at 20:51

## 2019-04-16 NOTE — IPNPDOC
Date Seen


The patient was seen on 4/16/19.





Progress Note


SUBJECTIVE: She is examined in her room sitting up in a chair eating breakfast 

and watching TV. She is very hard of hearing. She states it is difficult to keep

her oxygen nasal cannula on and it is very uncomfortable for her. When asked how

her breathing is she states it sometimes goes, sometimes bad. She is no longer 

coughing and she is able to take deeper breaths without any chest pain, which is

improved from admission. She is eating well, although she requests assistance 

with meals. She has no other complaints at this time.





OBJECTIVE


PHYSICAL EXAMINATION:


VITAL SIGNS: Please see below. 


GENERAL: Sitting up in the bedside chair, no acute distress, calm and 

cooperative


HEENT: Moist mucous membranes, extraocular movements intact, no thyromegaly


CARDIOVASCULAR: Somewhat tachycardic but sinus rhythm, no murmurs, rubs or 

gallops


RESPIRATORY: Diminished breath sounds diffusely but no adventitious breath 

sounds appreciated


ABDOMINAL: Nontender to palpation with + bowel sounds, no masses/organomegaly


EXTREMITIES: trace edema in lower extremities bilaterally


NEUROLOGICAL: no focal deficits appreciated


PSYCHOLOGICAL: somewhat anxious mood, normal affect





LABORATORY DATA, IMAGING STUDIES, MICROBIOLOGY: Please see below.





Echocardiogram: none on file





DVT prophylaxis ordered?: [facility]





ASSESSMENT AND PLAN: This is a 78 YO F with history of COPD, DM2, CKD and 

atrophic kidney s/p nephrostomy tube who presented with fatigue and weakness 

found to have pulmonary vascular congestion and possible pneumonia. She was 

recently moved from the ICU s/p pressor treatment and is now in the PCU und

ergoing close observation. 





PROBLEMS:


1. Septic shock s/p pressors:


-Continue Meropenem and Vancomycin until 4/17/19


-BP WNL and HR somewhat tachycardic, although this may be 2/2 use of 

albuterol/steroids and her anxiety





2. CHF:


-Patient does still seem a bit fluid overloaded on exam, although this has 

improved from prior hospital days


-Nephrology on board. Appreciate recommendations regarding diuresis at this 

time. 


-Lasix 80mg IV given today for further diuresis





3. Acute on chronic kidney disease and ARF: Cr improving. 1.28 today from 1.5 

yesterday


-Nephrology on board 





4. COPD: Patient continues on 2LNC


-Continue Advair, DuoNebs


-Continue prednisone daily





5. Anemia: normocytic, 2/2 chronic disease


-Hgb 8.1, and this has been her baseline throughout this hospitalization. She 

may benefit from transfusion once volume status has been optimized





6. Hyponatremia: 


-Sodium 134 from 135 yesterday


-Will continue to monitor





7. Hyperkalemia:


-Improving. K 5.0 today from 5.5 yesterday


-All other electrolytes WNL. Will continue to monitor





8. Gout:


-Continue Febuxostat





9. HLD:


-Continue Atorvastatin





10. Hypothyroidism:


-Continue levothyroxine





DISPOSITION: pending clinical improvement





VS, I&O, 24H, Fishbone


Vital Signs/I&O





Vital Signs








  Date Time  Temp Pulse Resp B/P (MAP) Pulse Ox O2 Delivery O2 Flow Rate FiO2


 


4/16/19 08:00 98.5 115 20 150/72 (98) 99  2.0 


 


4/13/19 05:45        35














I&O- Last 24 Hours up to 6 AM 


 


 4/16/19





 06:00


 


Intake Total 530 ml


 


Output Total 2425 ml


 


Balance -1895 ml











Laboratory Data


24H LABS


Laboratory Tests 2


4/15/19 12:24: Bedside Glucose (Misc Panel) 230H


4/15/19 16:49: Bedside Glucose (Misc Panel) 220H


4/15/19 20:01: Bedside Glucose (Misc Panel) 163H


4/16/19 04:04: 


Nucleated Red Blood Cells % (auto) 0.0, Anion Gap 5L, Glomerular Filtration Rate

42.6, Blood Urea Nitrogen 36H, Creatinine 1.28, Sodium Level 134L, Potassium 

Level 5.0, Chloride Level 103, Carbon Dioxide Level 26, Calcium Level 8.3L


CBC/BMP


Laboratory Tests


4/16/19 04:04








Red Blood Count 2.84 L, Mean Corpuscular Volume 87.7, Mean Corpuscular H

emoglobin 28.5, Mean Corpuscular Hemoglobin Concent 32.5, Red Cell Distribution 

Width 15.1 H, Calcium Level 8.3 L


Microbiology





Microbiology


4/10/19 Blood Culture - Final, Complete


          NO GROWTH AFTER 5 DAYS


4/10/19 Blood Culture - Final, Complete


          NO GROWTH AFTER 5 DAYS


4/10/19 Urine Culture - Final, Complete





GME ATTESTATION


GME ATTESTATION


My faculty preceptor for this patient encounter was physically present during 

the encounter and was fully available. All aspects of the patient interview, 

examination, medical decision making process, and medical care plan development 

were reviewed and approved by the faculty preceptor. The faculty preceptor is 

aware and concurs with the plan as stated in the body of this note and will 

attest to such by his/her cosignature.











FERNANDO ROCHE MD              Apr 16, 2019 11:44

## 2019-04-16 NOTE — REP
Portable chest, 07:39 a.m., single AP upright view:

 

Comparison is 04/15/2020 19.

Diffuse interstitial coarsening is again identified, unchanged.

There is atelectasis in the lung bases bilaterally as an interval change.

Cardiac size is upper normal, unchanged.

The left IJ central venous catheter tip is in satisfactory position, unchanged.

Impression:

Bibasilar atelectasis as an interval change.

Small bilateral pleural effusions as an interval change.

 

 

Electronically Signed by

Bennie Murrell MD 04/16/2019 07:46 A

## 2019-04-16 NOTE — IPN
DATE OF VISIT:  04/16/2019

 

Mrs. Cordoba is seen this morning on her bedside.  She was sleeping and I woke her

up.  She still feeling weak and tired.  Her dyspnea improved with the diuresis

yesterday.  She still has some leg edema.  She denies any nausea or vomiting.

 

PHYSICAL EXAMINATION:

Temperature 98.5 degrees Fahrenheit, heart rate 115 per minute and respiratory

rate 20 per minute.  Blood pressure 150/72 mmHg and oxygen saturation 99% on 2

liters oxygen.  Intake and output records from yesterday show a negative fluid

balance of 1920 with total intake of 480 and output 2400.  I am not sure if her

intake is recorded accurately.  Her weight is down by 0.9 kg in the last 24

hours.

Head is atraumatic.  Neck is supple and JVD is still seems to be elevated.

Heart sounds are irregular and tachycardic.

Lungs with bibasilar rales.

Abdomen soft and nontender and bowel sounds are normal.

Extremities have no cyanosis or clubbing.  Lower extremity edema is still 2+.

 

Today's labs show WBC count 6.9, hemoglobin 8.1 and hematocrit 24.9.  Platelets

254.  Sodium 134, potassium 5.0, CO2 26, BUN 36 and creatinine 1.28.

 

PROBLEMS:

1.  Acute renal failure superimposed on chronic kidney disease.  Kidney function

is improving nicely and she had a good urine output yesterday.  We will recheck

her renal profile tomorrow morning.

 

2.  Congestive heart failure.  Volume status is still somewhat decompensated.

Giving her Lasix 80 mg intravenously one dose today and will try to get a

negative fluid balance of about 1 liter.

 

3.  Hyperkalemia.  Potassium level has improved to 5.0.  We will continue our

efforts to diurese her, which is likely to help with further improvement in her

potassium level.

 

4.  Anemia.  Her anemia is significant and I would recommend considering to

transfuse her.  She is not likely to improve without transfusion.

## 2019-04-17 VITALS — SYSTOLIC BLOOD PRESSURE: 146 MMHG | DIASTOLIC BLOOD PRESSURE: 84 MMHG

## 2019-04-17 VITALS — DIASTOLIC BLOOD PRESSURE: 72 MMHG | SYSTOLIC BLOOD PRESSURE: 137 MMHG

## 2019-04-17 VITALS — DIASTOLIC BLOOD PRESSURE: 62 MMHG | SYSTOLIC BLOOD PRESSURE: 140 MMHG

## 2019-04-17 VITALS — DIASTOLIC BLOOD PRESSURE: 90 MMHG | SYSTOLIC BLOOD PRESSURE: 139 MMHG

## 2019-04-17 VITALS — SYSTOLIC BLOOD PRESSURE: 146 MMHG | DIASTOLIC BLOOD PRESSURE: 74 MMHG

## 2019-04-17 VITALS — DIASTOLIC BLOOD PRESSURE: 78 MMHG | SYSTOLIC BLOOD PRESSURE: 150 MMHG

## 2019-04-17 LAB
BUN SERPL-MCNC: 31 MG/DL (ref 7–18)
CALCIUM SERPL-MCNC: 8.3 MG/DL (ref 8.8–10.2)
CHLORIDE SERPL-SCNC: 102 MEQ/L (ref 98–107)
CO2 SERPL-SCNC: 26 MEQ/L (ref 21–32)
CREAT SERPL-MCNC: 1.08 MG/DL (ref 0.55–1.3)
GFR SERPL CREATININE-BSD FRML MDRD: 51.8 ML/MIN/{1.73_M2} (ref 32–?)
GLUCOSE SERPL-MCNC: 122 MG/DL (ref 70–100)
HCT VFR BLD AUTO: 26.1 % (ref 36–47)
HCT VFR BLD AUTO: 32.7 % (ref 36–47)
HGB BLD-MCNC: 11 G/DL (ref 12–15.5)
HGB BLD-MCNC: 8.5 G/DL (ref 12–15.5)
MCH RBC QN AUTO: 28.3 PG (ref 27–33)
MCHC RBC AUTO-ENTMCNC: 32.6 G/DL (ref 32–36.5)
MCV RBC AUTO: 87 FL (ref 80–96)
PLATELET # BLD AUTO: 290 10^3/UL (ref 150–450)
POTASSIUM SERPL-SCNC: 4.9 MEQ/L (ref 3.5–5.1)
RBC # BLD AUTO: 3 10^6/UL (ref 4–5.4)
SODIUM SERPL-SCNC: 134 MEQ/L (ref 136–145)
WBC # BLD AUTO: 7.2 10^3/UL (ref 4–10)

## 2019-04-17 PROCEDURE — 30233N1 TRANSFUSION OF NONAUTOLOGOUS RED BLOOD CELLS INTO PERIPHERAL VEIN, PERCUTANEOUS APPROACH: ICD-10-PCS | Performed by: INTERNAL MEDICINE

## 2019-04-17 RX ADMIN — IPRATROPIUM BROMIDE AND ALBUTEROL SULFATE SCH ML: .5; 3 SOLUTION RESPIRATORY (INHALATION) at 11:19

## 2019-04-17 RX ADMIN — HYDROCODONE BITARTRATE AND ACETAMINOPHEN PRN TAB: 5; 325 TABLET ORAL at 06:23

## 2019-04-17 RX ADMIN — IPRATROPIUM BROMIDE AND ALBUTEROL SULFATE SCH ML: .5; 3 SOLUTION RESPIRATORY (INHALATION) at 20:00

## 2019-04-17 RX ADMIN — FEBUXOSTAT SCH MG: 40 TABLET, COATED ORAL at 07:53

## 2019-04-17 RX ADMIN — PAROXETINE HYDROCHLORIDE SCH MG: 10 TABLET, FILM COATED ORAL at 10:21

## 2019-04-17 RX ADMIN — FLUTICASONE PROPIONATE AND SALMETEROL XINAFOATE SCH PUFF: 230; 21 AEROSOL, METERED RESPIRATORY (INHALATION) at 21:29

## 2019-04-17 RX ADMIN — IPRATROPIUM BROMIDE AND ALBUTEROL SULFATE SCH ML: .5; 3 SOLUTION RESPIRATORY (INHALATION) at 04:00

## 2019-04-17 RX ADMIN — SODIUM CHLORIDE SCH UNITS: 4.5 INJECTION, SOLUTION INTRAVENOUS at 21:42

## 2019-04-17 RX ADMIN — PANTOPRAZOLE SODIUM SCH MG: 20 TABLET, DELAYED RELEASE ORAL at 21:41

## 2019-04-17 RX ADMIN — ASPIRIN SCH MG: 81 TABLET ORAL at 07:56

## 2019-04-17 RX ADMIN — HYDROCODONE BITARTRATE AND ACETAMINOPHEN PRN TAB: 5; 325 TABLET ORAL at 19:25

## 2019-04-17 RX ADMIN — INSULIN LISPRO SCH UNITS: 100 INJECTION, SOLUTION INTRAVENOUS; SUBCUTANEOUS at 07:57

## 2019-04-17 RX ADMIN — Medication SCH UNITS: at 14:31

## 2019-04-17 RX ADMIN — METOPROLOL SUCCINATE SCH MG: 25 TABLET, EXTENDED RELEASE ORAL at 07:55

## 2019-04-17 RX ADMIN — FLUTICASONE PROPIONATE AND SALMETEROL XINAFOATE SCH PUFF: 230; 21 AEROSOL, METERED RESPIRATORY (INHALATION) at 10:26

## 2019-04-17 RX ADMIN — HYDROCODONE BITARTRATE AND ACETAMINOPHEN PRN TAB: 5; 325 TABLET ORAL at 11:57

## 2019-04-17 RX ADMIN — Medication SCH UNITS: at 05:30

## 2019-04-17 RX ADMIN — SODIUM CHLORIDE SCH ML: 9 INJECTION, SOLUTION INTRAMUSCULAR; INTRAVENOUS; SUBCUTANEOUS at 21:41

## 2019-04-17 RX ADMIN — INSULIN LISPRO SCH UNITS: 100 INJECTION, SOLUTION INTRAVENOUS; SUBCUTANEOUS at 21:00

## 2019-04-17 RX ADMIN — DOCUSATE SODIUM SCH MG: 100 CAPSULE, LIQUID FILLED ORAL at 21:41

## 2019-04-17 RX ADMIN — ATORVASTATIN CALCIUM SCH MG: 20 TABLET, FILM COATED ORAL at 07:55

## 2019-04-17 RX ADMIN — SODIUM CHLORIDE SCH ML: 9 INJECTION, SOLUTION INTRAMUSCULAR; INTRAVENOUS; SUBCUTANEOUS at 14:32

## 2019-04-17 RX ADMIN — INSULIN LISPRO SCH UNITS: 100 INJECTION, SOLUTION INTRAVENOUS; SUBCUTANEOUS at 11:58

## 2019-04-17 RX ADMIN — IPRATROPIUM BROMIDE AND ALBUTEROL SULFATE SCH ML: .5; 3 SOLUTION RESPIRATORY (INHALATION) at 16:01

## 2019-04-17 RX ADMIN — LEVOTHYROXINE SODIUM SCH MCG: 100 TABLET ORAL at 05:30

## 2019-04-17 RX ADMIN — Medication SCH UNITS: at 21:41

## 2019-04-17 RX ADMIN — SODIUM CHLORIDE SCH UNITS: 4.5 INJECTION, SOLUTION INTRAVENOUS at 07:56

## 2019-04-17 RX ADMIN — IPRATROPIUM BROMIDE AND ALBUTEROL SULFATE SCH ML: .5; 3 SOLUTION RESPIRATORY (INHALATION) at 08:00

## 2019-04-17 RX ADMIN — DOCUSATE SODIUM SCH MG: 100 CAPSULE, LIQUID FILLED ORAL at 07:55

## 2019-04-17 RX ADMIN — PANTOPRAZOLE SODIUM SCH MG: 20 TABLET, DELAYED RELEASE ORAL at 07:55

## 2019-04-17 RX ADMIN — SODIUM CHLORIDE SCH ML: 9 INJECTION, SOLUTION INTRAMUSCULAR; INTRAVENOUS; SUBCUTANEOUS at 05:30

## 2019-04-17 RX ADMIN — TIOTROPIUM BROMIDE SCH INHALATION: 18 CAPSULE ORAL; RESPIRATORY (INHALATION) at 10:26

## 2019-04-17 RX ADMIN — INSULIN LISPRO SCH UNITS: 100 INJECTION, SOLUTION INTRAVENOUS; SUBCUTANEOUS at 17:31

## 2019-04-17 RX ADMIN — MEROPENEM AND SODIUM CHLORIDE SCH MLS/HR: 500 INJECTION, SOLUTION INTRAVENOUS at 04:04

## 2019-04-17 NOTE — IPN
DATE: 04/17/2019

 

 

Mrs. Cordoba is seen this morning on her bedside.  She is sitting in the chair at

the time of my visit.  She is feeling better and her weakness is slightly

improved.  She denies any nausea, vomiting, dyspnea or chest pain.

 

PHYSICAL EXAMINATION

Temperature 98 degrees Fahrenheit, heart rate 109 per minute and respiratory rate

18 per minute.  Blood pressure 137/72 mmHg and oxygen saturation 97%.  Head is

atraumatic.  Neck is supple and JVD is difficult to be assessed sitting upright.

Lungs with slightly diminished breath sounds and heart sounds are tachycardiac

and irregular in rhythm.  Abdomen:  Soft and nontender and bowel sounds are

present.  Extremities:  Have no cyanosis or clubbing.  Lower extremity edema is

still about 1+.  Intake and output records from yesterday are negative by about

1560 mL.

 

Today's labs show sodium 134, potassium 4.9, CO2 26, BUN 31 and creatinine 1.08.

Glucose 122 and calcium 8.3.  Hemoglobin is 8.5 and hematocrit 26.1.

 

PROBLEMS:

1.  Acute renal failure superimposed on chronic kidney disease.  She probably has

mild chronic kidney disease, but acute renal failure has resolved almost

completely.

 

2,  Congestive heart failure.  Volume status has improved significantly with

diuresis over last few days.  She has been given 40 mg Lasix again today.  I feel

that is appropriate as we do still want to have mild negative fluid balance.

 

3.  Anemia.  I will defer to hospitalist service to see if they would like to

transfuse her with 1 unit of packed RBCs.  Her anemia is slightly better with

diuresis, but not optimum. I think one unit transfusion would optimize her

condition for discharge.

 

4. Hyponatremia.  Her sodium level has improved and stabilized.  Diuresis is

helping.

 

5.  Hyperkalemia.  Potassium level has also improved and now it is high normal.

She is not receiving any potassium-sparing diuretic or potassium supplement.  She

is not a suitable candidate for ACE inhibitor or angiotensin receptor blocker.

## 2019-04-17 NOTE — REP
Portable chest, 06:53 a.m., single AP semi upright view:

The bibasilar atelectasis and small bilateral pleural effusions are unchanged.

Cardiac size is upper normal, unchanged.

The left IJ central venous catheter tip is in the superior vena cava in

satisfactory position, unchanged.

There are orthopedic screws in the right humeral head, unchanged.

Impression:

There is no interval change.

 

 

Electronically Signed by

Bennie Murrell MD 04/17/2019 07:35 A

## 2019-04-17 NOTE — NUR
Recommend please upgrade to level 3 Mechanical soft (NDD) diet and continue regular thin 
liquids. 

-------------------------------------------------------------------------------

Addendum: 04/17/19 at 1129 by MUNIRA STEWART [facility]

-------------------------------------------------------------------------------

Amended: Links added.

## 2019-04-17 NOTE — IPNPDOC
Date Seen


The patient was seen on 4/17/19.





Progress Note


SUBJECTIVE: Patient is observed eating breakfast this morning sitting up in her 

bedside chair with her legs elevated. She states she is feeling better but 

complains that her feet are in pain. She describes as a sharp pain and heaviness

of her legs. Otherwise, her breathing has subjectively improved and she was able

to work with physical therapy yesterday. The patient continues to get somewhat 

tachycardic (147) with ambulation. Her son was contacted this morning in order 

to offer verbal consent to transfuse 2U blood for her this morning. 





OBJECTIVE


PHYSICAL EXAMINATION:


VITAL SIGNS: Please see below. 


GENERAL: Sitting up in the bedside chair, no acute distress, calm and 

cooperative


HEENT: Moist mucous membranes, extraocular movements intact, no thyromegaly


CARDIOVASCULAR: Somewhat tachycardic but sinus rhythm, no murmurs, rubs or 

gallops


RESPIRATORY: Diminished breath sounds diffusely with some crackles appreciated 

in lower and middle lobes bilaterally.


ABDOMINAL: Nontender to palpation with + bowel sounds, no masses/organomegaly


EXTREMITIES: trace edema in lower extremities bilaterally


NEUROLOGICAL: no focal deficits appreciated


PSYCHOLOGICAL: somewhat anxious mood, normal affect





LABORATORY DATA, IMAGING STUDIES, MICROBIOLOGY: Please see below.





Echocardiogram: none on file





DVT prophylaxis ordered?: [facility]





ASSESSMENT AND PLAN: This is a 80 YO F with history of COPD, DM2, CKD and 

atrophic kidney s/p nephrostomy tube who presented with fatigue and weakness 

found to have pulmonary vascular congestion and possible pneumonia. She was 

recently moved from the ICU s/p pressor treatment and is now in the PCU 

undergoing close observation. 





PROBLEMS:


1. Septic shock s/p pressors:


-s/p 8 days Meropenem, 4 days Vancomycin


-BP WNL and HR somewhat tachycardic, although this may be 2/2 use of 

albuterol/steroids and her anxiety. Restarted home Paxil





2. CHF:


-Patient does still somewhat fluid overloaded on exam, although this has 

improved. She has some crackles bilaterally and peripheral edema. Given that she

will be transfused 2U pRBCs today, will be cautious of fluid overload and 

diurese as necessary


-Nephrology on board. Appreciate recommendations regarding diuresis at this 

time. 


-Lasix as needed for diuresis 





3. Acute on chronic kidney disease and ARF: Cr improving. 1.08 today from 1.28 

yesterday


-Nephrology on board 





4. COPD: Patient continues on 2LNC


-Continue Advair, DuoNebs


-Continue prednisone daily





5. Anemia: normocytic, 2/2 chronic disease


-Hgb 8.5, and this has been her baseline throughout this hospitalization. 


-Plan to transfuse 2U pRBCs today. Her son was verbally consented





6. Hyponatremia: 


-Sodium 134 today. Stable


-Will continue to monitor





7. Hyperkalemia:


-Improving. K 4.9 today 


-All other electrolytes WNL. Will continue to monitor





8. Gout:


-Continue Febuxostat





9. HLD:


-Continue Atorvastatin





10. Hypothyroidism:


-Continue levothyroxine





11. Mood disorder:


-restarted home Paxil 





DISPOSITION: pending clinical improvement





VS, I&O, 24H, Fishbone


Vital Signs/I&O





Vital Signs








  Date Time  Temp Pulse Resp B/P (MAP) Pulse Ox O2 Delivery O2 Flow Rate FiO2


 


4/17/19 08:00 98.1 111 19 140/62 (88) 95  2.0 


 


4/13/19 05:45        35














I&O- Last 24 Hours up to 6 AM0 


 


 4/17/19





 06:00


 


Intake Total 590 ml


 


Output Total 2375 ml


 


Balance -1785 ml











Laboratory Data


24H LABS


Laboratory Tests 2


4/16/19 12:12: Bedside Glucose (Misc Panel) 283H


4/16/19 17:29: Bedside Glucose (Misc Panel) 237H


4/16/19 20:43: Bedside Glucose (Misc Panel) 98


4/17/19 05:34: 


Nucleated Red Blood Cells % (auto) 0.0, Anion Gap 6L, Glomerular Filtration Rate

51.8, Blood Urea Nitrogen 31H, Creatinine 1.08, Sodium Level 134L, Potassium 

Level 4.9, Chloride Level 102, Carbon Dioxide Level 26, Calcium Level 8.3L


CBC/BMP


Laboratory Tests


4/17/19 05:34








Red Blood Count 3.00 L, Mean Corpuscular Volume 87.0, Mean Corpuscular 

Hemoglobin 28.3, Mean Corpuscular Hemoglobin Concent 32.6, Red Cell Distribution

Width 15.0 H, Calcium Level 8.3 L


Microbiology





Microbiology


4/10/19 Blood Culture - Final, Complete


          NO GROWTH AFTER 5 DAYS


4/10/19 Blood Culture - Final, Complete


          NO GROWTH AFTER 5 DAYS


4/16/19 Respiratory Virus Panel (PCR) ([facility]) - Final, Complete


          


4/16/19 Gram Stain, Received


          Pending


4/16/19 Sputum Culture, Received


          Pending


4/16/19 MRSA Screen, Received


          Pending


4/10/19 Urine Culture - Final, Complete





GME ATTESTATION


GME ATTESTATION


My faculty preceptor for this patient encounter was physically present during 

the encounter and was fully available. All aspects of the patient interview, ex

amination, medical decision making process, and medical care plan development 

were reviewed and approved by the faculty preceptor. The faculty preceptor is 

aware and concurs with the plan as stated in the body of this note and will 

attest to such by his/her cosignature.











FERNANDO ROCHE MD              Apr 17, 2019 10:34

## 2019-04-18 VITALS — DIASTOLIC BLOOD PRESSURE: 80 MMHG | SYSTOLIC BLOOD PRESSURE: 158 MMHG

## 2019-04-18 VITALS — DIASTOLIC BLOOD PRESSURE: 82 MMHG | SYSTOLIC BLOOD PRESSURE: 142 MMHG

## 2019-04-18 VITALS — SYSTOLIC BLOOD PRESSURE: 158 MMHG | DIASTOLIC BLOOD PRESSURE: 67 MMHG

## 2019-04-18 VITALS — SYSTOLIC BLOOD PRESSURE: 142 MMHG | DIASTOLIC BLOOD PRESSURE: 75 MMHG

## 2019-04-18 VITALS — DIASTOLIC BLOOD PRESSURE: 72 MMHG | SYSTOLIC BLOOD PRESSURE: 142 MMHG

## 2019-04-18 LAB
BUN SERPL-MCNC: 27 MG/DL (ref 7–18)
CALCIUM SERPL-MCNC: 8.1 MG/DL (ref 8.8–10.2)
CHLORIDE SERPL-SCNC: 101 MEQ/L (ref 98–107)
CO2 SERPL-SCNC: 27 MEQ/L (ref 21–32)
CREAT SERPL-MCNC: 1.01 MG/DL (ref 0.55–1.3)
GFR SERPL CREATININE-BSD FRML MDRD: 56 ML/MIN/{1.73_M2} (ref 32–?)
GLUCOSE SERPL-MCNC: 129 MG/DL (ref 70–100)
HCT VFR BLD AUTO: 31.5 % (ref 36–47)
HGB BLD-MCNC: 10.5 G/DL (ref 12–15.5)
MCH RBC QN AUTO: 28.6 PG (ref 27–33)
MCHC RBC AUTO-ENTMCNC: 33.3 G/DL (ref 32–36.5)
MCV RBC AUTO: 85.8 FL (ref 80–96)
PLATELET # BLD AUTO: 275 10^3/UL (ref 150–450)
POTASSIUM SERPL-SCNC: 4.6 MEQ/L (ref 3.5–5.1)
RBC # BLD AUTO: 3.67 10^6/UL (ref 4–5.4)
SODIUM SERPL-SCNC: 135 MEQ/L (ref 136–145)
WBC # BLD AUTO: 7.5 10^3/UL (ref 4–10)

## 2019-04-18 RX ADMIN — SODIUM CHLORIDE SCH UNITS: 4.5 INJECTION, SOLUTION INTRAVENOUS at 22:38

## 2019-04-18 RX ADMIN — PANTOPRAZOLE SODIUM SCH MG: 20 TABLET, DELAYED RELEASE ORAL at 22:38

## 2019-04-18 RX ADMIN — FLUTICASONE PROPIONATE AND SALMETEROL XINAFOATE SCH PUFF: 230; 21 AEROSOL, METERED RESPIRATORY (INHALATION) at 07:56

## 2019-04-18 RX ADMIN — HYDROCODONE BITARTRATE AND ACETAMINOPHEN PRN TAB: 5; 325 TABLET ORAL at 18:56

## 2019-04-18 RX ADMIN — SODIUM CHLORIDE SCH UNITS: 4.5 INJECTION, SOLUTION INTRAVENOUS at 08:01

## 2019-04-18 RX ADMIN — ASPIRIN SCH MG: 81 TABLET ORAL at 07:59

## 2019-04-18 RX ADMIN — ATORVASTATIN CALCIUM SCH MG: 20 TABLET, FILM COATED ORAL at 08:00

## 2019-04-18 RX ADMIN — IPRATROPIUM BROMIDE AND ALBUTEROL SULFATE SCH ML: .5; 3 SOLUTION RESPIRATORY (INHALATION) at 04:00

## 2019-04-18 RX ADMIN — IPRATROPIUM BROMIDE AND ALBUTEROL SULFATE SCH ML: .5; 3 SOLUTION RESPIRATORY (INHALATION) at 00:00

## 2019-04-18 RX ADMIN — DOCUSATE SODIUM SCH MG: 100 CAPSULE, LIQUID FILLED ORAL at 08:00

## 2019-04-18 RX ADMIN — SODIUM CHLORIDE SCH ML: 9 INJECTION, SOLUTION INTRAMUSCULAR; INTRAVENOUS; SUBCUTANEOUS at 22:00

## 2019-04-18 RX ADMIN — TIOTROPIUM BROMIDE SCH INHALATION: 18 CAPSULE ORAL; RESPIRATORY (INHALATION) at 07:56

## 2019-04-18 RX ADMIN — HYDROCODONE BITARTRATE AND ACETAMINOPHEN PRN TAB: 5; 325 TABLET ORAL at 22:58

## 2019-04-18 RX ADMIN — PANTOPRAZOLE SODIUM SCH MG: 20 TABLET, DELAYED RELEASE ORAL at 08:00

## 2019-04-18 RX ADMIN — Medication SCH UNITS: at 14:00

## 2019-04-18 RX ADMIN — IPRATROPIUM BROMIDE AND ALBUTEROL SULFATE SCH ML: .5; 3 SOLUTION RESPIRATORY (INHALATION) at 20:00

## 2019-04-18 RX ADMIN — IPRATROPIUM BROMIDE AND ALBUTEROL SULFATE SCH ML: .5; 3 SOLUTION RESPIRATORY (INHALATION) at 16:50

## 2019-04-18 RX ADMIN — IPRATROPIUM BROMIDE AND ALBUTEROL SULFATE SCH ML: .5; 3 SOLUTION RESPIRATORY (INHALATION) at 11:21

## 2019-04-18 RX ADMIN — METOPROLOL SUCCINATE SCH MG: 25 TABLET, EXTENDED RELEASE ORAL at 08:01

## 2019-04-18 RX ADMIN — LEVOTHYROXINE SODIUM SCH MCG: 100 TABLET ORAL at 06:00

## 2019-04-18 RX ADMIN — Medication SCH UNITS: at 06:00

## 2019-04-18 RX ADMIN — INSULIN LISPRO SCH UNITS: 100 INJECTION, SOLUTION INTRAVENOUS; SUBCUTANEOUS at 21:00

## 2019-04-18 RX ADMIN — INSULIN LISPRO SCH UNITS: 100 INJECTION, SOLUTION INTRAVENOUS; SUBCUTANEOUS at 17:30

## 2019-04-18 RX ADMIN — SODIUM CHLORIDE SCH ML: 9 INJECTION, SOLUTION INTRAMUSCULAR; INTRAVENOUS; SUBCUTANEOUS at 06:00

## 2019-04-18 RX ADMIN — HYDROCODONE BITARTRATE AND ACETAMINOPHEN PRN TAB: 5; 325 TABLET ORAL at 08:00

## 2019-04-18 RX ADMIN — Medication SCH UNITS: at 22:39

## 2019-04-18 RX ADMIN — HYDROCODONE BITARTRATE AND ACETAMINOPHEN PRN TAB: 5; 325 TABLET ORAL at 02:49

## 2019-04-18 RX ADMIN — SODIUM CHLORIDE SCH ML: 9 INJECTION, SOLUTION INTRAMUSCULAR; INTRAVENOUS; SUBCUTANEOUS at 14:00

## 2019-04-18 RX ADMIN — HYDROCODONE BITARTRATE AND ACETAMINOPHEN PRN TAB: 5; 325 TABLET ORAL at 11:51

## 2019-04-18 RX ADMIN — INSULIN LISPRO SCH UNITS: 100 INJECTION, SOLUTION INTRAVENOUS; SUBCUTANEOUS at 11:50

## 2019-04-18 RX ADMIN — DOCUSATE SODIUM SCH MG: 100 CAPSULE, LIQUID FILLED ORAL at 22:38

## 2019-04-18 RX ADMIN — IPRATROPIUM BROMIDE AND ALBUTEROL SULFATE SCH ML: .5; 3 SOLUTION RESPIRATORY (INHALATION) at 07:56

## 2019-04-18 RX ADMIN — INSULIN LISPRO SCH UNITS: 100 INJECTION, SOLUTION INTRAVENOUS; SUBCUTANEOUS at 07:59

## 2019-04-18 RX ADMIN — FEBUXOSTAT SCH MG: 40 TABLET, COATED ORAL at 07:59

## 2019-04-18 RX ADMIN — FLUTICASONE PROPIONATE AND SALMETEROL XINAFOATE SCH PUFF: 230; 21 AEROSOL, METERED RESPIRATORY (INHALATION) at 21:00

## 2019-04-18 RX ADMIN — PAROXETINE HYDROCHLORIDE SCH MG: 10 TABLET, FILM COATED ORAL at 08:00

## 2019-04-18 NOTE — IPNPDOC
Date Seen


The patient was seen on 4/18/19.





Progress Note


SUBJECTIVE: Patient is observed sitting in bedside chair eating breakfast this 

morning. She is no longer requiring supplemental oxygen and states that her 

breathing feels well. Overall she feels much better than she did when she was 

admitted. She was transfused 2U pRBCs yesterday without issue. She is concerned 

about going home and having enough support to take care of herself, as her son 

is disabled and unable to do so. The chronic pain in her feet is managed well. 

Otherwise, she has no complaints.





OBJECTIVE


PHYSICAL EXAMINATION:


VITAL SIGNS: Please see below. 


GENERAL: Sitting up in the bedside chair, no acute distress, calm and 

cooperative


HEENT: Moist mucous membranes, extraocular movements intact, no thyromegaly


CARDIOVASCULAR: Somewhat tachycardic but sinus rhythm, no murmurs, rubs or 

gallops


RESPIRATORY: Diminished breath sounds diffusely with some crackles appreciated 

in lower and middle lobes bilaterally.


ABDOMINAL: Nontender to palpation with + bowel sounds, no masses/organomegaly


EXTREMITIES: trace edema in lower extremities bilaterally


NEUROLOGICAL: no focal deficits appreciated


PSYCHOLOGICAL: somewhat anxious mood, normal affect





LABORATORY DATA, IMAGING STUDIES, MICROBIOLOGY: Please see below.





Echocardiogram: none on file





DVT prophylaxis ordered?: [facility]





ASSESSMENT AND PLAN: This is a 78 YO F with history of COPD, DM2, CKD and 

atrophic kidney s/p nephrostomy tube who presented with fatigue and weakness 

found to have pulmonary vascular congestion and possible pneumonia. She was 

recently moved from the ICU s/p pressor treatment and is now in the PCU undergo

ing close observation. 





PROBLEMS:


1. History of Septic shock s/p pressors: WBC stable at 7.5 today


-s/p 8 days Meropenem, 4 days Vancomycin


-BP WNL and VSS





2. CHF:


-Fluid status much improved, although still some trace LE edema bilaterally. S/p

transfusion 2U pRBCs yesterday + lasix 40mg IV for diuresis


-Nephrology on board. Appreciate recommendations regarding diuresis at this 

time. 


-Lasix as needed for diuresis 





3. Acute on chronic kidney disease and ARF: Cr improving. 1.01 today


-Nephrology on board 





4. COPD: Patient continues on 2LNC


-Continue Advair, DuoNebs


-Continue prednisone daily





5. Anemia: normocytic, 2/2 chronic disease


-s/p 2U pRBCs. Hgb 10.5 today





6. Hyponatremia: 


-Sodium 135 today. Stable


-Will continue to monitor





7. Hyperkalemia:


-Appears to have resolved. K 4.6 today 


-All other electrolytes WNL. Will continue to monitor





8. Gout:


-Continue Febuxostat





9. HLD:


-Continue Atorvastatin





10. Hypothyroidism:


-Continue levothyroxine





11. Mood disorder:


-restarted home Paxil 





DISPOSITION: pending clinical improvement





VS, I&O, 24H, Fishbone


Vital Signs/I&O





Vital Signs








  Date Time  Temp Pulse Resp B/P (MAP) Pulse Ox O2 Delivery O2 Flow Rate FiO2


 


4/18/19 08:30   18     


 


4/18/19 08:01  103  142/75    


 


4/18/19 08:00 98.0    92   


 


4/17/19 16:00       1.0 


 


4/13/19 05:45        35














I&O- Last 24 Hours up to 6 AM 


 


 4/18/19





 06:00


 


Intake Total 1150 ml


 


Output Total 2125 ml


 


Balance -975 ml











Laboratory Data


24H LABS


Laboratory Tests 2


4/17/19 11:47: Bedside Glucose (Misc Panel) 263H


4/17/19 17:27: Bedside Glucose (Misc Panel) 254H


4/17/19 20:04: Bedside Glucose (Misc Panel) 161H


4/18/19 06:01: 


Nucleated Red Blood Cells % (auto) 0.0, Anion Gap 7L, Glomerular Filtration Rate

56.0, Blood Urea Nitrogen 27H, Creatinine 1.01, Sodium Level 135L, Potassium 

Level 4.6, Chloride Level 101, Carbon Dioxide Level 27, Calcium Level 8.1L


CBC/BMP


Laboratory Tests


4/17/19 20:10








4/18/19 06:01








Red Blood Count 3.67 L, Mean Corpuscular Volume 85.8, Mean Corpuscular 

Hemoglobin 28.6, Mean Corpuscular Hemoglobin Concent 33.3, Red Cell Distribution

Width 14.9 H, Calcium Level 8.1 L


Microbiology





Microbiology


4/10/19 Blood Culture - Final, Complete


          NO GROWTH AFTER 5 DAYS


4/10/19 Blood Culture - Final, Complete


          NO GROWTH AFTER 5 DAYS


4/16/19 Respiratory Virus Panel (PCR) ([facility]) - Final, Complete


          


4/16/19 Gram Stain - Final, Resulted


          


4/16/19 Sputum Culture, Resulted


          Pending


4/16/19 MRSA Screen, Received


          Pending


4/10/19 Urine Culture - Final, Complete





GME ATTESTATION


GME ATTESTATION


My faculty preceptor for this patient encounter was physically present during 

the encounter and was fully available. All aspects of the patient interview, 

examination, medical decision making process, and medical care plan development 

were reviewed and approved by the faculty preceptor. The faculty preceptor is 

aware and concurs with the plan as stated in the body of this note and will 

attest to such by his/her cosignature.











FERNANDO ROCHE MD              Apr 18, 2019 10:26

## 2019-04-18 NOTE — REP
Portable chest, 06:44 a.m., single AP view, patient sitting:

Comparison is 04/17/2019.

The bibasilar infiltrates have improved.  The small bilateral pleural effusions

have slightly improved.  Lung fields otherwise clear.

Cardiac size is minimally enlarged, unchanged.

The left IJ central venous catheter remains in satisfactory position, unchanged.

Impression:

There has been slight improvement in the bibasilar infiltrates and bilateral

pleural effusions.

 

 

Electronically Signed by

Bennie Murrell MD 04/18/2019 07:38 A

## 2019-04-19 VITALS — SYSTOLIC BLOOD PRESSURE: 142 MMHG | DIASTOLIC BLOOD PRESSURE: 72 MMHG

## 2019-04-19 VITALS — SYSTOLIC BLOOD PRESSURE: 144 MMHG | DIASTOLIC BLOOD PRESSURE: 75 MMHG

## 2019-04-19 VITALS — DIASTOLIC BLOOD PRESSURE: 70 MMHG | SYSTOLIC BLOOD PRESSURE: 141 MMHG

## 2019-04-19 LAB
BUN SERPL-MCNC: 21 MG/DL (ref 7–18)
CALCIUM SERPL-MCNC: 7.7 MG/DL (ref 8.8–10.2)
CHLORIDE SERPL-SCNC: 103 MEQ/L (ref 98–107)
CO2 SERPL-SCNC: 25 MEQ/L (ref 21–32)
CREAT SERPL-MCNC: 0.98 MG/DL (ref 0.55–1.3)
GFR SERPL CREATININE-BSD FRML MDRD: 58 ML/MIN/{1.73_M2} (ref 32–?)
GLUCOSE SERPL-MCNC: 133 MG/DL (ref 70–100)
HCT VFR BLD AUTO: 31.4 % (ref 36–47)
HGB BLD-MCNC: 10.2 G/DL (ref 12–15.5)
MCH RBC QN AUTO: 28.1 PG (ref 27–33)
MCHC RBC AUTO-ENTMCNC: 32.5 G/DL (ref 32–36.5)
MCV RBC AUTO: 86.5 FL (ref 80–96)
PLATELET # BLD AUTO: 261 10^3/UL (ref 150–450)
POTASSIUM SERPL-SCNC: 4.1 MEQ/L (ref 3.5–5.1)
RBC # BLD AUTO: 3.63 10^6/UL (ref 4–5.4)
SODIUM SERPL-SCNC: 137 MEQ/L (ref 136–145)
WBC # BLD AUTO: 6.9 10^3/UL (ref 4–10)

## 2019-04-19 RX ADMIN — INSULIN LISPRO SCH UNITS: 100 INJECTION, SOLUTION INTRAVENOUS; SUBCUTANEOUS at 08:15

## 2019-04-19 RX ADMIN — Medication SCH UNITS: at 12:20

## 2019-04-19 RX ADMIN — IPRATROPIUM BROMIDE AND ALBUTEROL SULFATE SCH ML: .5; 3 SOLUTION RESPIRATORY (INHALATION) at 00:00

## 2019-04-19 RX ADMIN — PAROXETINE HYDROCHLORIDE SCH MG: 10 TABLET, FILM COATED ORAL at 08:16

## 2019-04-19 RX ADMIN — SODIUM CHLORIDE SCH UNITS: 4.5 INJECTION, SOLUTION INTRAVENOUS at 20:30

## 2019-04-19 RX ADMIN — ASPIRIN SCH MG: 81 TABLET ORAL at 08:16

## 2019-04-19 RX ADMIN — IPRATROPIUM BROMIDE AND ALBUTEROL SULFATE SCH ML: .5; 3 SOLUTION RESPIRATORY (INHALATION) at 14:51

## 2019-04-19 RX ADMIN — Medication SCH UNITS: at 22:00

## 2019-04-19 RX ADMIN — HYDROCODONE BITARTRATE AND ACETAMINOPHEN PRN TAB: 5; 325 TABLET ORAL at 12:52

## 2019-04-19 RX ADMIN — FLUTICASONE PROPIONATE AND SALMETEROL XINAFOATE SCH PUFF: 230; 21 AEROSOL, METERED RESPIRATORY (INHALATION) at 21:44

## 2019-04-19 RX ADMIN — PANTOPRAZOLE SODIUM SCH MG: 20 TABLET, DELAYED RELEASE ORAL at 08:16

## 2019-04-19 RX ADMIN — IPRATROPIUM BROMIDE AND ALBUTEROL SULFATE SCH ML: .5; 3 SOLUTION RESPIRATORY (INHALATION) at 11:43

## 2019-04-19 RX ADMIN — SODIUM CHLORIDE SCH ML: 9 INJECTION, SOLUTION INTRAMUSCULAR; INTRAVENOUS; SUBCUTANEOUS at 05:29

## 2019-04-19 RX ADMIN — SODIUM CHLORIDE SCH UNITS: 4.5 INJECTION, SOLUTION INTRAVENOUS at 08:16

## 2019-04-19 RX ADMIN — FLUTICASONE PROPIONATE AND SALMETEROL XINAFOATE SCH PUFF: 230; 21 AEROSOL, METERED RESPIRATORY (INHALATION) at 07:29

## 2019-04-19 RX ADMIN — IPRATROPIUM BROMIDE AND ALBUTEROL SULFATE SCH ML: .5; 3 SOLUTION RESPIRATORY (INHALATION) at 07:29

## 2019-04-19 RX ADMIN — FEBUXOSTAT SCH MG: 40 TABLET, COATED ORAL at 08:16

## 2019-04-19 RX ADMIN — LEVOTHYROXINE SODIUM SCH MCG: 100 TABLET ORAL at 05:29

## 2019-04-19 RX ADMIN — INSULIN LISPRO SCH UNITS: 100 INJECTION, SOLUTION INTRAVENOUS; SUBCUTANEOUS at 21:00

## 2019-04-19 RX ADMIN — DOCUSATE SODIUM SCH MG: 100 CAPSULE, LIQUID FILLED ORAL at 08:16

## 2019-04-19 RX ADMIN — ATORVASTATIN CALCIUM SCH MG: 20 TABLET, FILM COATED ORAL at 08:16

## 2019-04-19 RX ADMIN — SODIUM CHLORIDE SCH ML: 9 INJECTION, SOLUTION INTRAMUSCULAR; INTRAVENOUS; SUBCUTANEOUS at 22:00

## 2019-04-19 RX ADMIN — SODIUM CHLORIDE SCH ML: 9 INJECTION, SOLUTION INTRAMUSCULAR; INTRAVENOUS; SUBCUTANEOUS at 12:21

## 2019-04-19 RX ADMIN — HYDROCODONE BITARTRATE AND ACETAMINOPHEN PRN TAB: 5; 325 TABLET ORAL at 20:31

## 2019-04-19 RX ADMIN — PANTOPRAZOLE SODIUM SCH MG: 20 TABLET, DELAYED RELEASE ORAL at 20:32

## 2019-04-19 RX ADMIN — IPRATROPIUM BROMIDE AND ALBUTEROL SULFATE SCH ML: .5; 3 SOLUTION RESPIRATORY (INHALATION) at 04:00

## 2019-04-19 RX ADMIN — METOPROLOL SUCCINATE SCH MG: 25 TABLET, EXTENDED RELEASE ORAL at 08:16

## 2019-04-19 RX ADMIN — IPRATROPIUM BROMIDE AND ALBUTEROL SULFATE SCH ML: .5; 3 SOLUTION RESPIRATORY (INHALATION) at 23:32

## 2019-04-19 RX ADMIN — INSULIN LISPRO SCH UNITS: 100 INJECTION, SOLUTION INTRAVENOUS; SUBCUTANEOUS at 18:30

## 2019-04-19 RX ADMIN — IPRATROPIUM BROMIDE AND ALBUTEROL SULFATE SCH ML: .5; 3 SOLUTION RESPIRATORY (INHALATION) at 20:00

## 2019-04-19 RX ADMIN — TIOTROPIUM BROMIDE SCH INHALATION: 18 CAPSULE ORAL; RESPIRATORY (INHALATION) at 07:29

## 2019-04-19 RX ADMIN — Medication SCH UNITS: at 05:29

## 2019-04-19 RX ADMIN — HYDROCODONE BITARTRATE AND ACETAMINOPHEN PRN TAB: 5; 325 TABLET ORAL at 04:24

## 2019-04-19 RX ADMIN — DOCUSATE SODIUM SCH MG: 100 CAPSULE, LIQUID FILLED ORAL at 20:31

## 2019-04-19 RX ADMIN — INSULIN LISPRO SCH UNITS: 100 INJECTION, SOLUTION INTRAVENOUS; SUBCUTANEOUS at 12:20

## 2019-04-19 NOTE — IPNPDOC
Date Seen


The patient was seen on 4/19/19.





Progress Note


SUBJECTIVE: Patient states she really wants to go home this morning. She is 

feeling well, without any shortness of breath, palpitations, or leg swelling. 

She denies n/v/d and is eating and drinking well. She unfortunately did not pass

physical therapy clearance today. Her family is concerned about her limitations 

at home, but the patient is reportedly not receptive to further PT sessions. 





OBJECTIVE


PHYSICAL EXAMINATION:


VITAL SIGNS: Please see below. 


GENERAL: Sitting up in the bedside chair, no acute distress, calm and 

cooperative


HEENT: Moist mucous membranes, extraocular movements intact, no thyromegaly


CARDIOVASCULAR: Somewhat tachycardic but sinus rhythm, no murmurs, rubs or valenzuela

ps


RESPIRATORY: Diminished breath sounds diffusely with some crackles appreciated 

in lower and middle lobes bilaterally.


ABDOMINAL: Nontender to palpation with + bowel sounds, no masses/organomegaly


EXTREMITIES: trace edema in lower extremities bilaterally


NEUROLOGICAL: no focal deficits appreciated


PSYCHOLOGICAL: somewhat anxious mood, normal affect





LABORATORY DATA, IMAGING STUDIES, MICROBIOLOGY: Please see below.





Echocardiogram: none on file





DVT prophylaxis ordered?: [facility]





ASSESSMENT AND PLAN: This is a 80 YO F with history of COPD, DM2, CKD and 

atrophic kidney s/p nephrostomy tube who presented with fatigue and weakness 

found to have pulmonary vascular congestion and possible pneumonia. The 

patient's breathing has improved but she continues to work with physical therapy

to improve her functional status prior to going home.





PROBLEMS:


1. History of Septic shock s/p pressors: WBC stable at 6.9 today


-s/p 8 days Meropenem, 4 days Vancomycin


-BP WNL and VSS





2. CHF:


-Fluid status much improved


-Nephrology signed off. Appreciate recommendations 


-Lasix as needed for diuresis 





3. Acute on chronic kidney disease and ARF: Cr 0.98 today


-Nephrology on board 





4. COPD: Patient is on room air with no additional oxygen requirement at this 

time


-Continue Advair, DuoNebs


-Continue prednisone daily





5. Anemia: normocytic, 2/2 chronic disease


-stable. Hgb 10.2 today





6. Hyponatremia: 


-Sodium 137 today. Stable


-Will continue to monitor





7. Hyperkalemia:


-Appears to have resolved. K 4.1 today 


-All other electrolytes WNL. Will continue to monitor





8. Gout:


-Continue Febuxostat





9. HLD:


-Continue Atorvastatin





10. Hypothyroidism:


-Continue levothyroxine





11. Mood disorder:


-restarted home Paxil 





DISPOSITION: pending PT clearance





VS, I&O, 24H, Fishbone


Vital Signs/I&O





Vital Signs








  Date Time  Temp Pulse Resp B/P (MAP) Pulse Ox O2 Delivery O2 Flow Rate FiO2


 


4/19/19 14:00 98.0 98 20 142/72 (95) 97   


 


4/17/19 16:00       1.0 


 


4/13/19 05:45        35














I&O- Last 24 Hours up to 6 AM 


 


 4/19/19





 06:00


 


Intake Total 390 ml


 


Output Total 995 ml


 


Balance -605 ml











Laboratory Data


24H LABS


Laboratory Tests 2


4/18/19 20:31: Bedside Glucose (Misc Panel) 108


4/19/19 05:27: 


Nucleated Red Blood Cells % (auto) 0.0, Anion Gap 9, Glomerular Filtration Rate 

58.0, Blood Urea Nitrogen 21H, Creatinine 0.98, Sodium Level 137, Potassium 

Level 4.1, Chloride Level 103, Carbon Dioxide Level 25, Calcium Level 7.7L


4/19/19 11:25: Bedside Glucose (Misc Panel) 133H


CBC/BMP


Laboratory Tests


4/19/19 05:27








Red Blood Count 3.63 L, Mean Corpuscular Volume 86.5, Mean Corpuscular Hemoglo

bin 28.1, Mean Corpuscular Hemoglobin Concent 32.5, Red Cell Distribution Width 

15.1 H, Calcium Level 7.7 L


Microbiology





Microbiology


4/10/19 Blood Culture - Final, Complete


          NO GROWTH AFTER 5 DAYS


4/10/19 Blood Culture - Final, Complete


          NO GROWTH AFTER 5 DAYS


4/16/19 Respiratory Virus Panel (PCR) ([facility]) - Final, Complete


          


4/16/19 Gram Stain - Final, Complete


          


4/16/19 Sputum Culture - Final, Complete


          Yeast Like Organism


4/16/19 MRSA Screen - Final, Complete


          


4/10/19 Urine Culture - Final, Complete





GME ATTESTATION


GME ATTESTATION


My faculty preceptor for this patient encounter was physically present during 

the encounter and was fully available. All aspects of the patient interview, 

examination, medical decision making process, and medical care plan development 

were reviewed and approved by the faculty preceptor. The faculty preceptor is 

aware and concurs with the plan as stated in the body of this note and will 

attest to such by his/her cosignature.











FERNANDO ROCHE MD              Apr 19, 2019 16:15

## 2019-04-20 VITALS — DIASTOLIC BLOOD PRESSURE: 79 MMHG | SYSTOLIC BLOOD PRESSURE: 163 MMHG

## 2019-04-20 VITALS — SYSTOLIC BLOOD PRESSURE: 160 MMHG | DIASTOLIC BLOOD PRESSURE: 73 MMHG

## 2019-04-20 VITALS — DIASTOLIC BLOOD PRESSURE: 77 MMHG | SYSTOLIC BLOOD PRESSURE: 150 MMHG

## 2019-04-20 LAB
BUN SERPL-MCNC: 19 MG/DL (ref 7–18)
CALCIUM SERPL-MCNC: 8.3 MG/DL (ref 8.8–10.2)
CHLORIDE SERPL-SCNC: 104 MEQ/L (ref 98–107)
CO2 SERPL-SCNC: 23 MEQ/L (ref 21–32)
CREAT SERPL-MCNC: 0.91 MG/DL (ref 0.55–1.3)
GFR SERPL CREATININE-BSD FRML MDRD: > 60 ML/MIN/{1.73_M2} (ref 32–?)
GLUCOSE SERPL-MCNC: 138 MG/DL (ref 70–100)
HCT VFR BLD AUTO: 32.9 % (ref 36–47)
HGB BLD-MCNC: 10.7 G/DL (ref 12–15.5)
MCH RBC QN AUTO: 28.2 PG (ref 27–33)
MCHC RBC AUTO-ENTMCNC: 32.5 G/DL (ref 32–36.5)
MCV RBC AUTO: 86.8 FL (ref 80–96)
PLATELET # BLD AUTO: 243 10^3/UL (ref 150–450)
POTASSIUM SERPL-SCNC: 4.1 MEQ/L (ref 3.5–5.1)
RBC # BLD AUTO: 3.79 10^6/UL (ref 4–5.4)
SODIUM SERPL-SCNC: 136 MEQ/L (ref 136–145)
WBC # BLD AUTO: 6.7 10^3/UL (ref 4–10)

## 2019-04-20 RX ADMIN — PANTOPRAZOLE SODIUM SCH MG: 20 TABLET, DELAYED RELEASE ORAL at 07:43

## 2019-04-20 RX ADMIN — Medication SCH UNITS: at 20:36

## 2019-04-20 RX ADMIN — PAROXETINE HYDROCHLORIDE SCH MG: 10 TABLET, FILM COATED ORAL at 07:43

## 2019-04-20 RX ADMIN — TIOTROPIUM BROMIDE SCH INHALATION: 18 CAPSULE ORAL; RESPIRATORY (INHALATION) at 08:27

## 2019-04-20 RX ADMIN — SODIUM CHLORIDE SCH ML: 9 INJECTION, SOLUTION INTRAMUSCULAR; INTRAVENOUS; SUBCUTANEOUS at 12:22

## 2019-04-20 RX ADMIN — HYDROCODONE BITARTRATE AND ACETAMINOPHEN PRN TAB: 5; 325 TABLET ORAL at 13:44

## 2019-04-20 RX ADMIN — IPRATROPIUM BROMIDE AND ALBUTEROL SULFATE SCH ML: .5; 3 SOLUTION RESPIRATORY (INHALATION) at 11:14

## 2019-04-20 RX ADMIN — SODIUM CHLORIDE SCH ML: 9 INJECTION, SOLUTION INTRAMUSCULAR; INTRAVENOUS; SUBCUTANEOUS at 05:59

## 2019-04-20 RX ADMIN — FEBUXOSTAT SCH MG: 40 TABLET, COATED ORAL at 07:43

## 2019-04-20 RX ADMIN — SODIUM CHLORIDE SCH UNITS: 4.5 INJECTION, SOLUTION INTRAVENOUS at 07:44

## 2019-04-20 RX ADMIN — ATORVASTATIN CALCIUM SCH MG: 20 TABLET, FILM COATED ORAL at 07:43

## 2019-04-20 RX ADMIN — LEVOTHYROXINE SODIUM SCH MCG: 100 TABLET ORAL at 05:59

## 2019-04-20 RX ADMIN — Medication SCH UNITS: at 12:22

## 2019-04-20 RX ADMIN — IPRATROPIUM BROMIDE AND ALBUTEROL SULFATE SCH ML: .5; 3 SOLUTION RESPIRATORY (INHALATION) at 20:00

## 2019-04-20 RX ADMIN — METOPROLOL SUCCINATE SCH MG: 25 TABLET, EXTENDED RELEASE ORAL at 07:45

## 2019-04-20 RX ADMIN — SODIUM CHLORIDE SCH ML: 9 INJECTION, SOLUTION INTRAMUSCULAR; INTRAVENOUS; SUBCUTANEOUS at 20:36

## 2019-04-20 RX ADMIN — DOCUSATE SODIUM SCH MG: 100 CAPSULE, LIQUID FILLED ORAL at 07:43

## 2019-04-20 RX ADMIN — IPRATROPIUM BROMIDE AND ALBUTEROL SULFATE SCH ML: .5; 3 SOLUTION RESPIRATORY (INHALATION) at 08:36

## 2019-04-20 RX ADMIN — PANTOPRAZOLE SODIUM SCH MG: 20 TABLET, DELAYED RELEASE ORAL at 20:35

## 2019-04-20 RX ADMIN — IPRATROPIUM BROMIDE AND ALBUTEROL SULFATE SCH ML: .5; 3 SOLUTION RESPIRATORY (INHALATION) at 04:00

## 2019-04-20 RX ADMIN — INSULIN LISPRO SCH UNITS: 100 INJECTION, SOLUTION INTRAVENOUS; SUBCUTANEOUS at 20:36

## 2019-04-20 RX ADMIN — ASPIRIN SCH MG: 81 TABLET ORAL at 07:43

## 2019-04-20 RX ADMIN — IPRATROPIUM BROMIDE AND ALBUTEROL SULFATE SCH ML: .5; 3 SOLUTION RESPIRATORY (INHALATION) at 15:33

## 2019-04-20 RX ADMIN — INSULIN LISPRO SCH UNITS: 100 INJECTION, SOLUTION INTRAVENOUS; SUBCUTANEOUS at 17:43

## 2019-04-20 RX ADMIN — FLUTICASONE PROPIONATE AND SALMETEROL XINAFOATE SCH PUFF: 230; 21 AEROSOL, METERED RESPIRATORY (INHALATION) at 08:28

## 2019-04-20 RX ADMIN — INSULIN LISPRO SCH UNITS: 100 INJECTION, SOLUTION INTRAVENOUS; SUBCUTANEOUS at 07:44

## 2019-04-20 RX ADMIN — IPRATROPIUM BROMIDE AND ALBUTEROL SULFATE SCH ML: .5; 3 SOLUTION RESPIRATORY (INHALATION) at 08:00

## 2019-04-20 RX ADMIN — DOCUSATE SODIUM SCH MG: 100 CAPSULE, LIQUID FILLED ORAL at 20:35

## 2019-04-20 RX ADMIN — SODIUM CHLORIDE SCH UNITS: 4.5 INJECTION, SOLUTION INTRAVENOUS at 20:35

## 2019-04-20 RX ADMIN — Medication SCH UNITS: at 05:59

## 2019-04-20 RX ADMIN — HYDROCODONE BITARTRATE AND ACETAMINOPHEN PRN TAB: 5; 325 TABLET ORAL at 05:59

## 2019-04-20 RX ADMIN — INSULIN LISPRO SCH UNITS: 100 INJECTION, SOLUTION INTRAVENOUS; SUBCUTANEOUS at 12:22

## 2019-04-20 NOTE — IPNPDOC
Date Seen


The patient was seen on 4/20/19.





Progress Note


SUBJECTIVE: Patient is eager to go home. It was explained to her that she needs 

to pass PT exercises first. She was coughing up clear mucus this morning so a 

DuoNeb treatment was initiated. Otherwise, no changes overnight and no further 

complaints this morning





OBJECTIVE


PHYSICAL EXAMINATION:


VITAL SIGNS: Please see below. 


GENERAL: Sitting up in the bedside chair, no acute distress, calm and 

cooperative


HEENT: Moist mucous membranes, extraocular movements intact, no thyromegaly


CARDIOVASCULAR: Somewhat tachycardic but sinus rhythm, no murmurs, rubs or 

gallops


RESPIRATORY: Diminished breath sounds diffusely with some crackles appreciated 

in lower and middle lobes bilaterally.


ABDOMINAL: Nontender to palpation with + bowel sounds, no masses/organomegaly


EXTREMITIES: trace edema in lower extremities bilaterally


NEUROLOGICAL: no focal deficits appreciated


PSYCHOLOGICAL: somewhat anxious mood, normal affect





LABORATORY DATA, IMAGING STUDIES, MICROBIOLOGY: Please see below.





Echocardiogram: none on file





DVT prophylaxis ordered?: [facility]





ASSESSMENT AND PLAN: This is a 80 YO F with history of COPD, DM2, CKD and 

atrophic kidney s/p nephrostomy tube who presented with fatigue and weakness 

found to have pulmonary vascular congestion and possible pneumonia. The 

patient's breathing has improved but she continues to work with physical therapy

to improve her functional status prior to going home.





PROBLEMS:


1. History of Septic shock s/p pressors: WBC stable at 6.9 today


-s/p 8 days Meropenem, 4 days Vancomycin


-BP WNL and VSS





2. CHF:


-Fluid status much improved


-Nephrology signed off. Appreciate recommendations 


-Lasix as needed for diuresis 





3. Acute on chronic kidney disease and ARF: Cr 0.98 today


-Nephrology on board 





4. COPD: Patient is on room air with no additional oxygen requirement at this 

time


-Continue Advair, DuoNebs


-Continue prednisone daily





5. Anemia: normocytic, 2/2 chronic disease


-stable. Hgb 10.7 today





6. Hyponatremia: 


-Sodium 136 today. Stable


-Will continue to monitor





7. Hyperkalemia:


-Appears to have resolved. K 4.1 today 


-All other electrolytes WNL. Will continue to monitor





8. Gout:


-Continue Febuxostat





9. HLD:


-Continue Atorvastatin





10. Hypothyroidism:


-Continue levothyroxine





11. Mood disorder:


-restarted home Paxil 





DISPOSITION: pending PT clearance





VS, I&O, 24H, Fishbone


Vital Signs/I&O





Vital Signs








  Date Time  Temp Pulse Resp B/P (MAP) Pulse Ox O2 Delivery O2 Flow Rate FiO2


 


4/20/19 07:45  84  142/76    


 


4/20/19 06:29   18     


 


4/20/19 06:00 98.1    95   


 


4/17/19 16:00       1.0 














I&O- Last 24 Hours up to 6 AM 


 


 4/20/19





 06:00


 


Intake Total 1264 ml


 


Output Total 920 ml


 


Balance 344 ml











Laboratory Data


24H LABS


Laboratory Tests 2


4/19/19 11:25: Bedside Glucose (Misc Panel) 133H


4/19/19 16:42: Bedside Glucose (Misc Panel) 129H


4/19/19 20:46: Bedside Glucose (Misc Panel) 108


4/20/19 05:58: 


Nucleated Red Blood Cells % (auto) 0.0, Anion Gap 9, Glomerular Filtration Rate 

> 60.0, Blood Urea Nitrogen 19H, Creatinine 0.91, Sodium Level 136, Potassium 

Level 4.1, Chloride Level 104, Carbon Dioxide Level 23, Calcium Level 8.3L


CBC/BMP


Laboratory Tests


4/20/19 05:58








Red Blood Count 3.79 L, Mean Corpuscular Volume 86.8, Mean Corpuscular 

Hemoglobin 28.2, Mean Corpuscular Hemoglobin Concent 32.5, Red Cell Distribution

Width 15.1 H, Calcium Level 8.3 L


Microbiology





Microbiology


4/10/19 Blood Culture - Final, Complete


          NO GROWTH AFTER 5 DAYS


4/10/19 Blood Culture - Final, Complete


          NO GROWTH AFTER 5 DAYS


4/16/19 Respiratory Virus Panel (PCR) ([facility]) - Final, Complete


          


4/16/19 Gram Stain - Final, Complete


          


4/16/19 Sputum Culture - Final, Complete


          Yeast Like Organism


4/16/19 MRSA Screen - Final, Complete


          


4/10/19 Urine Culture - Final, Complete





GME ATTESTATION


GME ATTESTATION


My faculty preceptor for this patient encounter was physically present during 

the encounter and was fully available. All aspects of the patient interview, 

examination, medical decision making process, and medical care plan development 

were reviewed and approved by the faculty preceptor. The faculty preceptor is 

aware and concurs with the plan as stated in the body of this note and will 

attest to such by his/her cosignature.











FERNANDO ROCHE MD              Apr 20, 2019 10:36

## 2019-04-21 VITALS — SYSTOLIC BLOOD PRESSURE: 160 MMHG | DIASTOLIC BLOOD PRESSURE: 77 MMHG

## 2019-04-21 LAB
BUN SERPL-MCNC: 16 MG/DL (ref 7–18)
CALCIUM SERPL-MCNC: 8.4 MG/DL (ref 8.8–10.2)
CHLORIDE SERPL-SCNC: 103 MEQ/L (ref 98–107)
CO2 SERPL-SCNC: 23 MEQ/L (ref 21–32)
CREAT SERPL-MCNC: 0.94 MG/DL (ref 0.55–1.3)
GFR SERPL CREATININE-BSD FRML MDRD: > 60 ML/MIN/{1.73_M2} (ref 32–?)
GLUCOSE SERPL-MCNC: 149 MG/DL (ref 70–100)
HCT VFR BLD AUTO: 32.4 % (ref 36–47)
HGB BLD-MCNC: 10.3 G/DL (ref 12–15.5)
MCH RBC QN AUTO: 27.9 PG (ref 27–33)
MCHC RBC AUTO-ENTMCNC: 31.8 G/DL (ref 32–36.5)
MCV RBC AUTO: 87.8 FL (ref 80–96)
PLATELET # BLD AUTO: 224 10^3/UL (ref 150–450)
POTASSIUM SERPL-SCNC: 4 MEQ/L (ref 3.5–5.1)
RBC # BLD AUTO: 3.69 10^6/UL (ref 4–5.4)
SODIUM SERPL-SCNC: 136 MEQ/L (ref 136–145)
WBC # BLD AUTO: 6.7 10^3/UL (ref 4–10)

## 2019-04-21 RX ADMIN — PAROXETINE HYDROCHLORIDE SCH MG: 10 TABLET, FILM COATED ORAL at 08:22

## 2019-04-21 RX ADMIN — INSULIN LISPRO SCH UNITS: 100 INJECTION, SOLUTION INTRAVENOUS; SUBCUTANEOUS at 08:23

## 2019-04-21 RX ADMIN — IPRATROPIUM BROMIDE AND ALBUTEROL SULFATE SCH ML: .5; 3 SOLUTION RESPIRATORY (INHALATION) at 15:20

## 2019-04-21 RX ADMIN — INSULIN LISPRO SCH UNITS: 100 INJECTION, SOLUTION INTRAVENOUS; SUBCUTANEOUS at 12:29

## 2019-04-21 RX ADMIN — Medication SCH UNITS: at 06:00

## 2019-04-21 RX ADMIN — SODIUM CHLORIDE SCH UNITS: 4.5 INJECTION, SOLUTION INTRAVENOUS at 22:16

## 2019-04-21 RX ADMIN — IPRATROPIUM BROMIDE AND ALBUTEROL SULFATE PRN ML: .5; 3 SOLUTION RESPIRATORY (INHALATION) at 22:16

## 2019-04-21 RX ADMIN — FLUTICASONE PROPIONATE AND SALMETEROL XINAFOATE SCH PUFF: 230; 21 AEROSOL, METERED RESPIRATORY (INHALATION) at 07:45

## 2019-04-21 RX ADMIN — SODIUM CHLORIDE SCH UNITS: 4.5 INJECTION, SOLUTION INTRAVENOUS at 08:23

## 2019-04-21 RX ADMIN — PANTOPRAZOLE SODIUM SCH MG: 20 TABLET, DELAYED RELEASE ORAL at 08:22

## 2019-04-21 RX ADMIN — FLUTICASONE PROPIONATE AND SALMETEROL XINAFOATE SCH PUFF: 230; 21 AEROSOL, METERED RESPIRATORY (INHALATION) at 00:14

## 2019-04-21 RX ADMIN — METOPROLOL SUCCINATE SCH MG: 25 TABLET, EXTENDED RELEASE ORAL at 08:22

## 2019-04-21 RX ADMIN — INSULIN LISPRO SCH UNITS: 100 INJECTION, SOLUTION INTRAVENOUS; SUBCUTANEOUS at 21:00

## 2019-04-21 RX ADMIN — DOCUSATE SODIUM SCH MG: 100 CAPSULE, LIQUID FILLED ORAL at 22:15

## 2019-04-21 RX ADMIN — IPRATROPIUM BROMIDE AND ALBUTEROL SULFATE SCH ML: .5; 3 SOLUTION RESPIRATORY (INHALATION) at 02:22

## 2019-04-21 RX ADMIN — PANTOPRAZOLE SODIUM SCH MG: 20 TABLET, DELAYED RELEASE ORAL at 22:15

## 2019-04-21 RX ADMIN — INSULIN LISPRO SCH UNITS: 100 INJECTION, SOLUTION INTRAVENOUS; SUBCUTANEOUS at 17:43

## 2019-04-21 RX ADMIN — ATORVASTATIN CALCIUM SCH MG: 20 TABLET, FILM COATED ORAL at 08:22

## 2019-04-21 RX ADMIN — SODIUM CHLORIDE SCH ML: 9 INJECTION, SOLUTION INTRAMUSCULAR; INTRAVENOUS; SUBCUTANEOUS at 14:00

## 2019-04-21 RX ADMIN — HYDROCODONE BITARTRATE AND ACETAMINOPHEN PRN TAB: 5; 325 TABLET ORAL at 02:12

## 2019-04-21 RX ADMIN — LEVOTHYROXINE SODIUM SCH MCG: 100 TABLET ORAL at 06:45

## 2019-04-21 RX ADMIN — Medication SCH UNITS: at 17:43

## 2019-04-21 RX ADMIN — ASPIRIN SCH MG: 81 TABLET ORAL at 08:22

## 2019-04-21 RX ADMIN — IPRATROPIUM BROMIDE AND ALBUTEROL SULFATE SCH ML: .5; 3 SOLUTION RESPIRATORY (INHALATION) at 20:00

## 2019-04-21 RX ADMIN — IPRATROPIUM BROMIDE AND ALBUTEROL SULFATE SCH ML: .5; 3 SOLUTION RESPIRATORY (INHALATION) at 07:45

## 2019-04-21 RX ADMIN — Medication SCH UNITS: at 22:00

## 2019-04-21 RX ADMIN — IPRATROPIUM BROMIDE AND ALBUTEROL SULFATE SCH ML: .5; 3 SOLUTION RESPIRATORY (INHALATION) at 00:34

## 2019-04-21 RX ADMIN — SODIUM CHLORIDE SCH ML: 9 INJECTION, SOLUTION INTRAMUSCULAR; INTRAVENOUS; SUBCUTANEOUS at 22:00

## 2019-04-21 RX ADMIN — DOCUSATE SODIUM SCH MG: 100 CAPSULE, LIQUID FILLED ORAL at 08:22

## 2019-04-21 RX ADMIN — FLUTICASONE PROPIONATE AND SALMETEROL XINAFOATE SCH PUFF: 230; 21 AEROSOL, METERED RESPIRATORY (INHALATION) at 20:35

## 2019-04-21 RX ADMIN — HYDROCODONE BITARTRATE AND ACETAMINOPHEN PRN TAB: 5; 325 TABLET ORAL at 22:16

## 2019-04-21 RX ADMIN — IPRATROPIUM BROMIDE AND ALBUTEROL SULFATE SCH ML: .5; 3 SOLUTION RESPIRATORY (INHALATION) at 11:24

## 2019-04-21 RX ADMIN — SODIUM CHLORIDE SCH ML: 9 INJECTION, SOLUTION INTRAMUSCULAR; INTRAVENOUS; SUBCUTANEOUS at 06:00

## 2019-04-21 RX ADMIN — FEBUXOSTAT SCH MG: 40 TABLET, COATED ORAL at 08:22

## 2019-04-21 RX ADMIN — TIOTROPIUM BROMIDE SCH INHALATION: 18 CAPSULE ORAL; RESPIRATORY (INHALATION) at 07:45

## 2019-04-21 NOTE — IPNPDOC
Text Note


Date of Service


The patient was seen on 4/21/19.





NOTE


SUBJECTIVE: Patient seen and examined at bedside. No acute overnight events r

eported. Patient has no new medical complaints today. Understands that she 

requires more physical therapy for safe discharge. She is requesting more 

frequent physical therapy.





OBJECTIVE


PHYSICAL EXAMINATION:


VITAL SIGNS: Please see below. 


GENERAL: Sitting up in the bedside chair, no acute distress, in good spirits 

today


HEENT: Moist mucous membranes, extraocular movements intact, NC/AT


CARDIOVASCULAR: +S1S2


RESPIRATORY: diminished breath sounds b/l


ABDOMINAL: Nontender to palpation with + bowel sounds, no masses/organomegaly


EXTREMITIES: trace edema in lower extremities bilaterally


NEUROLOGICAL: no focal deficits appreciated





LABORATORY DATA, IMAGING STUDIES, MICROBIOLOGY: Please see below.





DVT prophylaxis ordered?: [facility]





ASSESSMENT AND PLAN: This is a 78 YO F with history of COPD, DM2, CKD and 

atrophic kidney s/p nephrostomy tube who presented with fatigue and weakness 

found to have pulmonary vascular congestion and possible pneumonia. The 

patient's breathing has improved but she continues to work with physical therapy

to improve her functional status prior to going home.





PROBLEMS:


1. History of Septic shock s/p pressors


- off abx - completed 8 days Meropenem, 4 days Vancomycin





2. CHF:


- compensated


- Nephrology signed off. Appreciate recommendations 


-Lasix as needed for diuresis 





3. MERI/CKD


- at baseline





4. COPD


- stable


-Continue Advair DuoNebs


-Continue prednisone daily





5. Anemia: normocytic, 2/2 chronic disease


-stable





6. Hyponatremia: 


-Stable


-Will continue to monitor





7. Hyperkalemia:


-resolved


-All other electrolytes WNL. Will continue to monitor





8. Gout:


-Continue Febuxostat





9. HLD:


-Continue Atorvastatin





10. Hypothyroidism:


-Continue levothyroxine





11. Mood disorder:


-restarted home Paxil 





DISPOSITION: pending PT clearance





VS,Mikael, I+O


VS, Dalye, I+O


Laboratory Tests


4/21/19 06:46








Red Blood Count 3.69 L, Mean Corpuscular Volume 87.8, Mean Corpuscular 

Hemoglobin 27.9, Mean Corpuscular Hemoglobin Concent 31.8 L, Red Cell 

Distribution Width 15.3 H, Calcium Level 8.4 L








Vital Signs








  Date Time  Temp Pulse Resp B/P (MAP) Pulse Ox O2 Delivery O2 Flow Rate FiO2


 


4/21/19 08:22  103  160/77    


 


4/21/19 06:00 98.2  16  98   


 


4/17/19 16:00       1.0 














I&O- Last 24 Hours up to 6 AM 


 


 4/21/19





 06:00


 


Intake Total 1380 ml


 


Output Total 1150 ml


 


Balance 230 ml

















OLGA JOHNSON MD              Apr 21, 2019 10:07

## 2019-04-22 VITALS — DIASTOLIC BLOOD PRESSURE: 63 MMHG | SYSTOLIC BLOOD PRESSURE: 141 MMHG

## 2019-04-22 VITALS — DIASTOLIC BLOOD PRESSURE: 73 MMHG | SYSTOLIC BLOOD PRESSURE: 144 MMHG

## 2019-04-22 VITALS — DIASTOLIC BLOOD PRESSURE: 79 MMHG | SYSTOLIC BLOOD PRESSURE: 142 MMHG

## 2019-04-22 LAB
BUN SERPL-MCNC: 14 MG/DL (ref 7–18)
CALCIUM SERPL-MCNC: 8.3 MG/DL (ref 8.8–10.2)
CHLORIDE SERPL-SCNC: 105 MEQ/L (ref 98–107)
CO2 SERPL-SCNC: 23 MEQ/L (ref 21–32)
CREAT SERPL-MCNC: 0.91 MG/DL (ref 0.55–1.3)
GFR SERPL CREATININE-BSD FRML MDRD: > 60 ML/MIN/{1.73_M2} (ref 32–?)
GLUCOSE SERPL-MCNC: 148 MG/DL (ref 70–100)
HCT VFR BLD AUTO: 31.3 % (ref 36–47)
HGB BLD-MCNC: 10.1 G/DL (ref 12–15.5)
MCH RBC QN AUTO: 28.6 PG (ref 27–33)
MCHC RBC AUTO-ENTMCNC: 32.3 G/DL (ref 32–36.5)
MCV RBC AUTO: 88.7 FL (ref 80–96)
PLATELET # BLD AUTO: 226 10^3/UL (ref 150–450)
POTASSIUM SERPL-SCNC: 4.1 MEQ/L (ref 3.5–5.1)
RBC # BLD AUTO: 3.53 10^6/UL (ref 4–5.4)
SODIUM SERPL-SCNC: 134 MEQ/L (ref 136–145)
WBC # BLD AUTO: 6 10^3/UL (ref 4–10)

## 2019-04-22 RX ADMIN — IPRATROPIUM BROMIDE AND ALBUTEROL SULFATE SCH ML: .5; 3 SOLUTION RESPIRATORY (INHALATION) at 03:31

## 2019-04-22 RX ADMIN — MAGNESIUM HYDROXIDE PRN ML: 400 SUSPENSION ORAL at 18:45

## 2019-04-22 RX ADMIN — INSULIN LISPRO SCH UNITS: 100 INJECTION, SOLUTION INTRAVENOUS; SUBCUTANEOUS at 17:20

## 2019-04-22 RX ADMIN — ATORVASTATIN CALCIUM SCH MG: 20 TABLET, FILM COATED ORAL at 08:27

## 2019-04-22 RX ADMIN — SODIUM CHLORIDE SCH UNITS: 4.5 INJECTION, SOLUTION INTRAVENOUS at 08:29

## 2019-04-22 RX ADMIN — SODIUM CHLORIDE SCH UNITS: 4.5 INJECTION, SOLUTION INTRAVENOUS at 20:52

## 2019-04-22 RX ADMIN — DOCUSATE SODIUM SCH MG: 100 CAPSULE, LIQUID FILLED ORAL at 08:27

## 2019-04-22 RX ADMIN — TIOTROPIUM BROMIDE SCH INHALATION: 18 CAPSULE ORAL; RESPIRATORY (INHALATION) at 07:52

## 2019-04-22 RX ADMIN — INSULIN LISPRO SCH UNITS: 100 INJECTION, SOLUTION INTRAVENOUS; SUBCUTANEOUS at 08:29

## 2019-04-22 RX ADMIN — INSULIN LISPRO SCH UNITS: 100 INJECTION, SOLUTION INTRAVENOUS; SUBCUTANEOUS at 12:08

## 2019-04-22 RX ADMIN — IPRATROPIUM BROMIDE AND ALBUTEROL SULFATE SCH ML: .5; 3 SOLUTION RESPIRATORY (INHALATION) at 07:52

## 2019-04-22 RX ADMIN — PAROXETINE HYDROCHLORIDE SCH MG: 10 TABLET, FILM COATED ORAL at 08:27

## 2019-04-22 RX ADMIN — IPRATROPIUM BROMIDE AND ALBUTEROL SULFATE SCH ML: .5; 3 SOLUTION RESPIRATORY (INHALATION) at 11:42

## 2019-04-22 RX ADMIN — HYDROCODONE BITARTRATE AND ACETAMINOPHEN PRN TAB: 5; 325 TABLET ORAL at 22:03

## 2019-04-22 RX ADMIN — Medication SCH UNITS: at 05:43

## 2019-04-22 RX ADMIN — PANTOPRAZOLE SODIUM SCH MG: 20 TABLET, DELAYED RELEASE ORAL at 08:27

## 2019-04-22 RX ADMIN — SODIUM CHLORIDE SCH ML: 9 INJECTION, SOLUTION INTRAMUSCULAR; INTRAVENOUS; SUBCUTANEOUS at 05:43

## 2019-04-22 RX ADMIN — IPRATROPIUM BROMIDE AND ALBUTEROL SULFATE SCH ML: .5; 3 SOLUTION RESPIRATORY (INHALATION) at 20:17

## 2019-04-22 RX ADMIN — FEBUXOSTAT SCH MG: 40 TABLET, COATED ORAL at 08:27

## 2019-04-22 RX ADMIN — PANTOPRAZOLE SODIUM SCH MG: 20 TABLET, DELAYED RELEASE ORAL at 20:52

## 2019-04-22 RX ADMIN — ASPIRIN SCH MG: 81 TABLET ORAL at 08:27

## 2019-04-22 RX ADMIN — DOCUSATE SODIUM SCH MG: 100 CAPSULE, LIQUID FILLED ORAL at 20:52

## 2019-04-22 RX ADMIN — METOPROLOL SUCCINATE SCH MG: 25 TABLET, EXTENDED RELEASE ORAL at 08:27

## 2019-04-22 RX ADMIN — IPRATROPIUM BROMIDE AND ALBUTEROL SULFATE SCH ML: .5; 3 SOLUTION RESPIRATORY (INHALATION) at 15:48

## 2019-04-22 RX ADMIN — INSULIN LISPRO SCH UNITS: 100 INJECTION, SOLUTION INTRAVENOUS; SUBCUTANEOUS at 20:48

## 2019-04-22 RX ADMIN — IPRATROPIUM BROMIDE AND ALBUTEROL SULFATE SCH ML: .5; 3 SOLUTION RESPIRATORY (INHALATION) at 00:48

## 2019-04-22 RX ADMIN — IPRATROPIUM BROMIDE AND ALBUTEROL SULFATE SCH ML: .5; 3 SOLUTION RESPIRATORY (INHALATION) at 23:53

## 2019-04-22 RX ADMIN — FLUTICASONE PROPIONATE AND SALMETEROL XINAFOATE SCH PUFF: 230; 21 AEROSOL, METERED RESPIRATORY (INHALATION) at 20:17

## 2019-04-22 RX ADMIN — FLUTICASONE PROPIONATE AND SALMETEROL XINAFOATE SCH PUFF: 230; 21 AEROSOL, METERED RESPIRATORY (INHALATION) at 07:52

## 2019-04-22 RX ADMIN — LEVOTHYROXINE SODIUM SCH MCG: 100 TABLET ORAL at 05:43

## 2019-04-22 NOTE — IPNPDOC
Date Seen


The patient was seen on 4/22/19.





Progress Note


SUBJECTIVE: Patient is still very eager to go home. No other complaints. 

Continues to work with PT and has been encouraged to walk the floors with 

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





OBJECTIVE


PHYSICAL EXAMINATION:


VITAL SIGNS: Please see below. 


GENERAL: Sitting up in the bedside chair, no acute distress, calm and 

cooperative


HEENT: Moist mucous membranes, extraocular movements intact, no thyromegaly


CARDIOVASCULAR: Somewhat tachycardic but sinus rhythm, no murmurs, rubs or 

gallops


RESPIRATORY: Diminished breath sounds diffusely with some crackles appreciated 

in lower and middle lobes bilaterally.


ABDOMINAL: Nontender to palpation with + bowel sounds, no masses/organomegaly


EXTREMITIES: no clubbing/cyanosis/edema


NEUROLOGICAL: no focal deficits appreciated


PSYCHOLOGICAL: somewhat anxious mood, normal affect





LABORATORY DATA, IMAGING STUDIES, MICROBIOLOGY: Please see below.





Echocardiogram: none on file





DVT prophylaxis ordered?: [facility]





ASSESSMENT AND PLAN: This is a 78 YO F with history of COPD, DM2, CKD and 

atrophic kidney s/p nephrostomy tube who presented with fatigue and weakness 

found to have pulmonary vascular congestion and possible pneumonia. The 

patient's breathing has improved but she continues to work with physical therapy

to improve her functional status prior to going home.





PROBLEMS:


1. History of Septic shock s/p pressors: 


-s/p 8 days Meropenem, 4 days Vancomycin


-BP WNL and VSS





2. CHF:


-Appears euvolemic


-Nephrology signed off. Appreciate recommendations 


-Lasix as needed for diuresis 





3. Acute on chronic kidney disease and ARF: Stable


-Nephrology on board 





4. COPD: Patient is on room air with no additional oxygen requirement at this 

time


-Continue Advair, DuoNebs


-Continue prednisone daily





5. Anemia: normocytic, 2/2 chronic disease


-stable





6. Hyponatremia: 


-Appears to have resolved


-Will continue to monitor





7. Hyperkalemia:


-Appears to have resolved


-All other electrolytes WNL. Will continue to monitor





8. Gout:


-Continue Febuxostat





9. HLD:


-Continue Atorvastatin





10. Hypothyroidism:


-Continue levothyroxine





11. Mood disorder:


-restarted home Paxil 





DISPOSITION: pending PT clearance





VS, I&O, 24H, Fishbone


Vital Signs/I&O





Vital Signs








  Date Time  Temp Pulse Resp B/P (MAP) Pulse Ox O2 Delivery O2 Flow Rate FiO2


 


4/22/19 08:27  102  142/79    


 


4/22/19 06:00 99.2  18  99   


 


4/17/19 16:00       1.0 














I&O- Last 24 Hours up to 6 AM 


 


 4/22/19





 06:00


 


Intake Total 1035 ml


 


Output Total 1075 ml


 


Balance -40 ml











Laboratory Data


24H LABS


Laboratory Tests 2


4/21/19 16:50: Bedside Glucose (Misc Panel) 161H


4/21/19 20:53: Bedside Glucose (Misc Panel) 87


4/22/19 05:46: 


Nucleated Red Blood Cells % (auto) 0.0, Anion Gap 6L, Glomerular Filtration Rate

> 60.0, Blood Urea Nitrogen 14, Creatinine 0.91, Sodium Level 134L, Potassium 

Level 4.1, Chloride Level 105, Carbon Dioxide Level 23, Calcium Level 8.3L


4/22/19 11:29: Bedside Glucose (Misc Panel) 108


CBC/BMP


Laboratory Tests


4/22/19 05:46








Red Blood Count 3.53 L, Mean Corpuscular Volume 88.7, Mean Corpuscular 

Hemoglobin 28.6, Mean Corpuscular Hemoglobin Concent 32.3, Red Cell Distribution

Width 15.6 H, Calcium Level 8.3 L


Microbiology





Microbiology


4/16/19 Respiratory Virus Panel (PCR) ([facility]) - Final, Complete


          


4/16/19 Gram Stain - Final, Complete


          


4/16/19 Sputum Culture - Final, Complete


          Yeast Like Organism


4/16/19 MRSA Screen - Final, Complete





GME ATTESTATION


GME ATTESTATION


My faculty preceptor for this patient encounter was physically present during 

the encounter and was fully available. All aspects of the patient interview, 

examination, medical decision making process, and medical care plan development 

were reviewed and approved by the faculty preceptor. The faculty preceptor is 

aware and concurs with the plan as stated in the body of this note and will 

attest to such by his/her cosignature.











FERNANDO ROCHE MD              Apr 22, 2019 12:30

## 2019-04-23 VITALS — DIASTOLIC BLOOD PRESSURE: 69 MMHG | SYSTOLIC BLOOD PRESSURE: 149 MMHG

## 2019-04-23 VITALS — DIASTOLIC BLOOD PRESSURE: 76 MMHG | SYSTOLIC BLOOD PRESSURE: 144 MMHG

## 2019-04-23 VITALS — SYSTOLIC BLOOD PRESSURE: 168 MMHG | DIASTOLIC BLOOD PRESSURE: 82 MMHG

## 2019-04-23 VITALS — SYSTOLIC BLOOD PRESSURE: 159 MMHG | DIASTOLIC BLOOD PRESSURE: 71 MMHG

## 2019-04-23 VITALS — DIASTOLIC BLOOD PRESSURE: 74 MMHG | SYSTOLIC BLOOD PRESSURE: 148 MMHG

## 2019-04-23 RX ADMIN — FUROSEMIDE SCH MG: 40 TABLET ORAL at 11:15

## 2019-04-23 RX ADMIN — FLUTICASONE PROPIONATE AND SALMETEROL XINAFOATE SCH PUFF: 230; 21 AEROSOL, METERED RESPIRATORY (INHALATION) at 08:59

## 2019-04-23 RX ADMIN — FEBUXOSTAT SCH MG: 40 TABLET, COATED ORAL at 08:38

## 2019-04-23 RX ADMIN — SODIUM CHLORIDE SCH UNITS: 4.5 INJECTION, SOLUTION INTRAVENOUS at 08:40

## 2019-04-23 RX ADMIN — LEVOTHYROXINE SODIUM SCH MCG: 100 TABLET ORAL at 05:47

## 2019-04-23 RX ADMIN — IPRATROPIUM BROMIDE AND ALBUTEROL SULFATE SCH ML: .5; 3 SOLUTION RESPIRATORY (INHALATION) at 12:09

## 2019-04-23 RX ADMIN — PAROXETINE HYDROCHLORIDE SCH MG: 10 TABLET, FILM COATED ORAL at 08:38

## 2019-04-23 RX ADMIN — ASPIRIN SCH MG: 81 TABLET ORAL at 08:38

## 2019-04-23 RX ADMIN — INSULIN LISPRO SCH UNITS: 100 INJECTION, SOLUTION INTRAVENOUS; SUBCUTANEOUS at 08:13

## 2019-04-23 RX ADMIN — PANTOPRAZOLE SODIUM SCH MG: 20 TABLET, DELAYED RELEASE ORAL at 20:14

## 2019-04-23 RX ADMIN — SODIUM CHLORIDE SCH UNITS: 4.5 INJECTION, SOLUTION INTRAVENOUS at 20:15

## 2019-04-23 RX ADMIN — ATORVASTATIN CALCIUM SCH MG: 20 TABLET, FILM COATED ORAL at 08:36

## 2019-04-23 RX ADMIN — INSULIN LISPRO SCH UNITS: 100 INJECTION, SOLUTION INTRAVENOUS; SUBCUTANEOUS at 11:17

## 2019-04-23 RX ADMIN — IPRATROPIUM BROMIDE AND ALBUTEROL SULFATE SCH ML: .5; 3 SOLUTION RESPIRATORY (INHALATION) at 20:00

## 2019-04-23 RX ADMIN — DOCUSATE SODIUM SCH MG: 100 CAPSULE, LIQUID FILLED ORAL at 08:39

## 2019-04-23 RX ADMIN — METOPROLOL SUCCINATE SCH MG: 25 TABLET, EXTENDED RELEASE ORAL at 08:37

## 2019-04-23 RX ADMIN — IPRATROPIUM BROMIDE AND ALBUTEROL SULFATE SCH ML: .5; 3 SOLUTION RESPIRATORY (INHALATION) at 04:14

## 2019-04-23 RX ADMIN — IPRATROPIUM BROMIDE AND ALBUTEROL SULFATE SCH ML: .5; 3 SOLUTION RESPIRATORY (INHALATION) at 08:00

## 2019-04-23 RX ADMIN — PANTOPRAZOLE SODIUM SCH MG: 20 TABLET, DELAYED RELEASE ORAL at 08:38

## 2019-04-23 RX ADMIN — FLUTICASONE PROPIONATE AND SALMETEROL XINAFOATE SCH PUFF: 230; 21 AEROSOL, METERED RESPIRATORY (INHALATION) at 20:20

## 2019-04-23 RX ADMIN — TIOTROPIUM BROMIDE SCH INHALATION: 18 CAPSULE ORAL; RESPIRATORY (INHALATION) at 08:59

## 2019-04-23 RX ADMIN — HYDROCODONE BITARTRATE AND ACETAMINOPHEN PRN TAB: 5; 325 TABLET ORAL at 14:28

## 2019-04-23 RX ADMIN — IPRATROPIUM BROMIDE AND ALBUTEROL SULFATE SCH ML: .5; 3 SOLUTION RESPIRATORY (INHALATION) at 15:15

## 2019-04-23 RX ADMIN — INSULIN LISPRO SCH UNITS: 100 INJECTION, SOLUTION INTRAVENOUS; SUBCUTANEOUS at 16:59

## 2019-04-23 RX ADMIN — HYDROCODONE BITARTRATE AND ACETAMINOPHEN PRN TAB: 5; 325 TABLET ORAL at 08:13

## 2019-04-23 RX ADMIN — HYDROCODONE BITARTRATE AND ACETAMINOPHEN PRN TAB: 5; 325 TABLET ORAL at 20:14

## 2019-04-23 RX ADMIN — DOCUSATE SODIUM SCH MG: 100 CAPSULE, LIQUID FILLED ORAL at 20:15

## 2019-04-23 RX ADMIN — INSULIN LISPRO SCH UNITS: 100 INJECTION, SOLUTION INTRAVENOUS; SUBCUTANEOUS at 21:00

## 2019-04-23 NOTE — IPNPDOC
Date Seen


The patient was seen on 4/23/19.





Progress Note


SUBJECTIVE: Patient is still very eager to go home. No other complaints. Pending

PT clearance today.





OBJECTIVE


PHYSICAL EXAMINATION:


VITAL SIGNS: Please see below. 


GENERAL: Sitting up in the bedside chair, no acute distress, calm and 

cooperative


HEENT: Moist mucous membranes, extraocular movements intact, no thyromegaly


CARDIOVASCULAR: Somewhat tachycardic but sinus rhythm, no murmurs, rubs or 

gallops


RESPIRATORY: Diminished breath sounds diffusely with some crackles appreciated 

in lower and middle lobes bilaterally.


ABDOMINAL: Nontender to palpation with + bowel sounds, no masses/organomegaly


EXTREMITIES: no clubbing/cyanosis/edema


NEUROLOGICAL: no focal deficits appreciated


PSYCHOLOGICAL: somewhat anxious mood, normal affect





LABORATORY DATA, IMAGING STUDIES, MICROBIOLOGY: Please see below.





Echocardiogram: none on file





DVT prophylaxis ordered?: [facility]





ASSESSMENT AND PLAN: This is a 78 YO F with history of COPD, DM2, CKD and 

atrophic kidney s/p nephrostomy tube who presented with fatigue and weakness 

found to have pulmonary vascular congestion and possible pneumonia. The 

patient's breathing has improved but she continues to work with physical therapy

to improve her functional status prior to going home.





PROBLEMS:


1. History of Septic shock s/p pressors: 


-s/p 8 days Meropenem, 4 days Vancomycin


-BP WNL and VSS





2. CHF:


-Appears euvolemic


-Nephrology signed off. Appreciate recommendations 


-Lasix as needed for diuresis 





3. Acute on chronic kidney disease and ARF: Stable


-Nephrology on board 





4. COPD: Patient is on room air with no additional oxygen requirement at this 

time


-Continue Advair, DuoNebs


-Continue prednisone daily





5. Anemia: normocytic, 2/2 chronic disease


-stable





6. Hyponatremia: 


-Appears to have resolved


-Will continue to monitor





7. Hyperkalemia:


-Appears to have resolved


-All other electrolytes WNL. Will continue to monitor





8. Gout:


-Continue Febuxostat





9. HLD:


-Continue Atorvastatin





10. Hypothyroidism:


-Continue levothyroxine





11. Mood disorder:


-restarted home Paxil 





DISPOSITION: pending PT clearance





VS, I&O, 24H, Fishbone


Vital Signs/I&O





Vital Signs








  Date Time  Temp Pulse Resp B/P (MAP) Pulse Ox O2 Delivery O2 Flow Rate FiO2


 


4/23/19 09:00   20     


 


4/23/19 08:37  98  159/71    


 


4/23/19 06:00 98.0    95   


 


4/17/19 16:00       1.0 














I&O- Last 24 Hours up to 6 AM 


 


 4/23/19





 06:00


 


Intake Total 1010 ml


 


Output Total 2525 ml


 


Balance -1515 ml











Laboratory Data


24H LABS


Laboratory Tests 2


4/22/19 16:51: Bedside Glucose (Misc Panel) 143H


4/22/19 20:17: Bedside Glucose (Misc Panel) 138H


4/23/19 06:22: Bedside Glucose (Misc Panel) 153H


4/23/19 11:08: Bedside Glucose (Misc Panel) 91


Microbiology





Microbiology


4/16/19 Respiratory Virus Panel (PCR) ([facility]) - Final, Complete


          


4/16/19 Gram Stain - Final, Complete


          


4/16/19 Sputum Culture - Final, Complete


          Yeast Like Organism


4/16/19 MRSA Screen - Final, Complete





GME ATTESTATION


GME ATTESTATION


My faculty preceptor for this patient encounter was physically present during 

the encounter and was fully available. All aspects of the patient interview, 

examination, medical decision making process, and medical care plan development 

were reviewed and approved by the faculty preceptor. The faculty preceptor is 

aware and concurs with the plan as stated in the body of this note and will 

attest to such by his/her cosignature.





ATTENDING NOTE


I, Thomas Sagastume, have both independently examined this patient as well as 

reviewed the documentation.  I have discussed in detail with the resident the 

findings and plan of treatment as documented in the residents documentation. I 

will continue to follow the patient and offer further guidance to the patients 

care as necessary during this hospital stay.











FERNANDO ROCHE MD              Apr 23, 2019 11:48


THOMAS SAGASTUME MD                Apr 23, 2019 18:15

## 2019-04-24 VITALS — DIASTOLIC BLOOD PRESSURE: 78 MMHG | SYSTOLIC BLOOD PRESSURE: 170 MMHG

## 2019-04-24 VITALS — DIASTOLIC BLOOD PRESSURE: 79 MMHG | SYSTOLIC BLOOD PRESSURE: 172 MMHG

## 2019-04-24 LAB
BUN SERPL-MCNC: 14 MG/DL (ref 7–18)
CALCIUM SERPL-MCNC: 8.6 MG/DL (ref 8.8–10.2)
CHLORIDE SERPL-SCNC: 104 MEQ/L (ref 98–107)
CO2 SERPL-SCNC: 22 MEQ/L (ref 21–32)
CREAT SERPL-MCNC: 0.98 MG/DL (ref 0.55–1.3)
GFR SERPL CREATININE-BSD FRML MDRD: 58 ML/MIN/{1.73_M2} (ref 32–?)
GLUCOSE SERPL-MCNC: 161 MG/DL (ref 70–100)
HCT VFR BLD AUTO: 32.3 % (ref 36–47)
HGB BLD-MCNC: 10.5 G/DL (ref 12–15.5)
MAGNESIUM SERPL-MCNC: 1.8 MG/DL (ref 1.8–2.4)
MCH RBC QN AUTO: 28.6 PG (ref 27–33)
MCHC RBC AUTO-ENTMCNC: 32.5 G/DL (ref 32–36.5)
MCV RBC AUTO: 88 FL (ref 80–96)
PLATELET # BLD AUTO: 262 10^3/UL (ref 150–450)
POTASSIUM SERPL-SCNC: 4.1 MEQ/L (ref 3.5–5.1)
RBC # BLD AUTO: 3.67 10^6/UL (ref 4–5.4)
SODIUM SERPL-SCNC: 136 MEQ/L (ref 136–145)
WBC # BLD AUTO: 5.7 10^3/UL (ref 4–10)

## 2019-04-24 RX ADMIN — PANTOPRAZOLE SODIUM SCH MG: 20 TABLET, DELAYED RELEASE ORAL at 07:47

## 2019-04-24 RX ADMIN — PAROXETINE HYDROCHLORIDE SCH MG: 10 TABLET, FILM COATED ORAL at 07:47

## 2019-04-24 RX ADMIN — IPRATROPIUM BROMIDE AND ALBUTEROL SULFATE SCH ML: .5; 3 SOLUTION RESPIRATORY (INHALATION) at 08:00

## 2019-04-24 RX ADMIN — DOCUSATE SODIUM SCH MG: 100 CAPSULE, LIQUID FILLED ORAL at 07:47

## 2019-04-24 RX ADMIN — HYDROCODONE BITARTRATE AND ACETAMINOPHEN PRN TAB: 5; 325 TABLET ORAL at 00:42

## 2019-04-24 RX ADMIN — ATORVASTATIN CALCIUM SCH MG: 20 TABLET, FILM COATED ORAL at 07:47

## 2019-04-24 RX ADMIN — FLUTICASONE PROPIONATE AND SALMETEROL XINAFOATE SCH PUFF: 230; 21 AEROSOL, METERED RESPIRATORY (INHALATION) at 08:47

## 2019-04-24 RX ADMIN — METOPROLOL SUCCINATE SCH MG: 25 TABLET, EXTENDED RELEASE ORAL at 07:47

## 2019-04-24 RX ADMIN — ASPIRIN SCH MG: 81 TABLET ORAL at 07:46

## 2019-04-24 RX ADMIN — IPRATROPIUM BROMIDE AND ALBUTEROL SULFATE SCH ML: .5; 3 SOLUTION RESPIRATORY (INHALATION) at 04:00

## 2019-04-24 RX ADMIN — INSULIN LISPRO SCH UNITS: 100 INJECTION, SOLUTION INTRAVENOUS; SUBCUTANEOUS at 11:57

## 2019-04-24 RX ADMIN — LEVOTHYROXINE SODIUM SCH MCG: 100 TABLET ORAL at 05:36

## 2019-04-24 RX ADMIN — IPRATROPIUM BROMIDE AND ALBUTEROL SULFATE SCH ML: .5; 3 SOLUTION RESPIRATORY (INHALATION) at 12:00

## 2019-04-24 RX ADMIN — INSULIN LISPRO SCH UNITS: 100 INJECTION, SOLUTION INTRAVENOUS; SUBCUTANEOUS at 07:46

## 2019-04-24 RX ADMIN — TIOTROPIUM BROMIDE SCH INHALATION: 18 CAPSULE ORAL; RESPIRATORY (INHALATION) at 08:47

## 2019-04-24 RX ADMIN — SODIUM CHLORIDE SCH UNITS: 4.5 INJECTION, SOLUTION INTRAVENOUS at 07:46

## 2019-04-24 RX ADMIN — FEBUXOSTAT SCH MG: 40 TABLET, COATED ORAL at 07:46

## 2019-04-24 RX ADMIN — FUROSEMIDE SCH MG: 40 TABLET ORAL at 07:46

## 2019-04-24 RX ADMIN — MAGNESIUM HYDROXIDE PRN ML: 400 SUSPENSION ORAL at 06:53

## 2019-04-24 RX ADMIN — IPRATROPIUM BROMIDE AND ALBUTEROL SULFATE SCH ML: .5; 3 SOLUTION RESPIRATORY (INHALATION) at 00:06

## 2019-04-25 NOTE — DS.PDOC
Discharge Summary


General


Date of Admission


Apr 10, 2019 at 11:25


Date of Discharge


April 24, 2019


Attending Physician:  THOMAS STONE MD


Specialist/Consultants Involve:  SHELBY MEJIA MD


Specialist/Consultants Involve


Intensivist: Radha Carpio


Speech Therapy: Brittany Story





Discharge Summary


PROCEDURES PERFORMED DURING STAY: [None].





ADMITTING DIAGNOSES: 


1. COPD


2. Pulmonary Hypertension


3. DM


4. HTN


5. Hypothyroidism


6. History of C. dif


7. 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





DISCHARGE DIAGNOSES:


1. Acute on chronic kidney disease





COMPLICATIONS/CHIEF COMPLAINT: Sepsis, Wilfrid, Renal Failure.





HISTORY OF PRESENT ILLNESS: "This is 79 years old white female transferred from 

[facility] emergency room..


Patient has a past medical history of COPD with pulmonary hypertension, diabetes

 mellitus, hypertension, hypothyroidism, history of C daily with atrophic left 

kidney.


All of's, so patient had anorexia many episodes. Acute renal failure with a 

creatinine approximately 0.8, but slowly has been increasing to 3.0.


In January 2019. She needed intermittent dialysis and eventually nephrostomy 

tube was placed in


Patient also was admitted to ICU after being intubated, found concurrent pne

umonia. She has been on a creatinine baseline of 0.7, till day of admission when

 she presented to [facility] with chief complaints of feeling weak and 

poor for the last few days. Patient has a nephrostomy tube and also does 

intermittent self cath for urinary output but has been slowly decreasing.


Patient was transferred to [facility] with possible sepsis, possible nep

hrostomy tube dysfunction, etc."





HOSPITAL COURSE:  


The patient was found to be septic (leukocytosis, tachycardia, lactic acidosis, 

febrile, metabolic acidosis) and admitted to the ICU under the care of a 

hospitalist. She was placed on aggressive IV fluid resuscitation, empiric broad-

spectrum antibiotics, and nephrology was consulted. A central line was placed by

 the intensivist on admission and pressor therapy with Levophed was started to 

titrate to MAP above 65. 





The patient gradually improved and was weaned off pressor support. Her 

respiratory status fluctuated with fluid resuscitation and subsequent diuresis, 

and it was suspected the patient was aspirating when eating, so speech therapy 

was consulted for recommendations. 





The patient was transferred to the PCU once respiratory and renal status 

improved, where she was transfused 2u PRBC for her chronic anemia 2/2 her 

chronic kidney disease. The patient then worked with physical therapy, who 

deemed her safe to return home.





DISCHARGE MEDICATIONS: 


Please see below.


 


ALLERGIES: 


Please see below.





PHYSICAL EXAMINATION ON DISCHARGE:


VITAL SIGNS: Please see below.


GENERAL: Sitting up in the bedside chair, no acute distress, calm and 

cooperative


HEENT: Moist mucous membranes, extraocular movements intact, no thyromegaly


CARDIOVASCULAR: Somewhat tachycardic but sinus rhythm, no murmurs, rubs or 

gallops


RESPIRATORY: Diminished breath sounds diffusely with some crackles appreciated 

in lower and middle lobes bilaterally.


ABDOMINAL: Nontender to palpation with + bowel sounds, no masses/organomegaly


EXTREMITIES: no clubbing/cyanosis/edema


NEUROLOGICAL: no focal deficits appreciated


PSYCHOLOGICAL: somewhat anxious mood, normal affect





LABORATORY DATA: 


Please see below.





IMAGING: 


See chart





PROGNOSIS: 


Fair





ACTIVITY: 


[As tolerated].





DIET: 


Renal diet





DISCHARGE PLAN: 


Follow up with PCP within 1 week


Remain compliant with treatment plan and medications


Return to the ER if you experience any problems





DISPOSITION: 


Home, Self-Care.





DISCHARGE CONDITION: [Stable].





TIME SPENT ON DISCHARGE: Greater than 35 minutes.





Vital Signs/I&Os





Vital Signs








  Date Time  Temp Pulse Resp B/P (MAP) Pulse Ox O2 Delivery O2 Flow Rate FiO2


 


4/24/19 07:47  87  170/78    


 


4/24/19 06:00 97.3  20  94   














I&O- Last 24 Hours up to 6 AM 


 


 4/25/19





 06:00


 


Intake Total 555 ml


 


Output Total 720 ml


 


Balance -165 ml











Laboratory Data


Labs 24H


Laboratory Tests 2


4/25/19 11:30: Lab Scanned Report Transfusion Record





Microbiology





Microbiology


4/16/19 Respiratory Virus Panel (PCR) (LORI) - Final, Complete


          


4/16/19 Gram Stain - Final, Complete


          


4/16/19 Sputum Culture - Final, Complete


          Yeast Like Organism


4/16/19 MRSA Screen - Final, Complete





Discharge Medications


Scheduled


Acetaminophen (Acetaminophen) 325 Mg Tablet, 325 MG PO ONCE, (Reported)


Amlodipine Besylate (Amlodipine Besylate) 5 Mg Tablet, 5 MG PO DAILY, (Reported)


Aspirin (Aspir 81) 81 Mg Tablet.dr, 81 MG PO DAILY, (Reported)


Atorvastatin Calcium (Atorvastatin Calcium) 20 Mg Tablet, 20 MG PO DAILY, 

(Reported)


Febuxostat (Uloric) 40 Mg Tablet, 40 MG PO DAILY


Fluticasone/Vilanterol (Breo Ellipta 100-25 Mcg INH) 1 Each Blst.w.dev, 1 PUFF 

INH DAILY, (Reported)


Furosemide (Furosemide) 40 Mg Tablet, 40 MG PO DAILY, (Reported)


Glipizide (Glipizide ER) 10 Mg Tab.er.24, 10 MG PO DAILY, (Reported)


Levothyroxine Sodium (Synthroid) 100 Mcg Tablet, 100 MCG PO DAILY, (Reported)


Linagliptin (Tradjenta) 5 Mg Tablet, 5 MG PO DAILY, (Reported)


Lisinopril (Lisinopril) 20 Mg Tablet, 20 MG PO DAILY, (Reported)


Magnesium Oxide (Magnesium Oxide) 400 Mg Tablet, 400 MG PO BID, (Reported)


Metformin HCl (Metformin HCl ER) 750 Mg Tab.er.24h, 750 MG PO QPM, (Reported)


   TAKE WITH EVENING MEAL 


Metoprolol Succinate (Metoprolol Succinate) 25 Mg Tab.er.24h, 12.5 MG PO DAILY


Pantoprazole Sodium (Pantoprazole Sodium) 20 Mg Tablet.dr, 20 MG PO BID, 

(Reported)


Paroxetine HCl (Paroxetine) 10 Mg Tablet, 10 MG PO DAILY, (Reported)


Potassium Chloride (Potassium Chloride) 20 Meq Tablet.er, 40 MEQ PO DAILY, 

(Reported)


Tiotropium Bromide Monohydrate (Spiriva) 18 Mcg Cap.w.dev, 1 INHALATION INH 

DAILY, (Reported)





Scheduled PRN


Albuterol Sulfate (Proair Hfa) 8.5 Gm Hfa.aer.ad, 2 PUFF INH Q4-6 PRN for 

SHORTNESS OF BREATH, (Reported)


Diphenhydramine HCl (Diphenhydramine HCl) 25 Mg Capsule, 25 MG PO Q4H PRN for 

ALLERGY SYMPTOMS, (Reported)


Oxycodone HCl (Oxycodone HCl) 10 Mg Tablet, 10 MG PO Q6H PRN for PAIN, 

(Reported)





Allergies


Coded Allergies:  


     Penicillins (Unverified  Allergy, Unknown, 4/10/19)


     budesonide (Unverified  Adverse Reaction, Intermediate, 4/10/19)


   


   NOTES AT PHARMACY STATE PATIENT HAD BAD SINUS REACTION


   


   AFTER USING


     formoterol (Unverified  Adverse Reaction, Intermediate, 4/10/19)


   


   NOTES AT PHARMACY STATE PATIENT HAD BAD SINUS REACTION


   


   AFTER USING





GME ATTESTATION


GME ATTESTATION


My faculty preceptor for this patient encounter was physically present during 

the encounter and was fully available. All aspects of the patient interview, 

examination, medical decision making process, and medical care plan development 

were reviewed and approved by the faculty preceptor. The faculty preceptor is 

aware and concurs with the plan as stated in the body of this note and will 

attest to such by his/her cosignature.





ATTENDING NOTE


I, Thomas Stone, have both independently examined this patient as well as 

reviewed the documentation.  I have discussed in detail with the resident the 

findings and plan of treatment as documented in the residents documentation. I 

will continue to follow the patient and offer further guidance to the patients 

care as necessary during this hospital stay.











FERNANDO ROCHE MD              Apr 25, 2019 13:27


THOMAS STONE MD                Apr 25, 2019 15:58

## 2019-07-04 ENCOUNTER — HOSPITAL ENCOUNTER (INPATIENT)
Dept: HOSPITAL 53 - M MSPAV | Age: 79
LOS: 3 days | Discharge: HOME HEALTH | DRG: 683 | End: 2019-07-07
Attending: INTERNAL MEDICINE | Admitting: INTERNAL MEDICINE
Payer: MEDICARE

## 2019-07-04 VITALS — DIASTOLIC BLOOD PRESSURE: 55 MMHG | SYSTOLIC BLOOD PRESSURE: 119 MMHG

## 2019-07-04 VITALS — DIASTOLIC BLOOD PRESSURE: 74 MMHG | SYSTOLIC BLOOD PRESSURE: 124 MMHG

## 2019-07-04 DIAGNOSIS — M10.30: ICD-10-CM

## 2019-07-04 DIAGNOSIS — I12.9: ICD-10-CM

## 2019-07-04 DIAGNOSIS — I71.4: ICD-10-CM

## 2019-07-04 DIAGNOSIS — N17.9: Primary | ICD-10-CM

## 2019-07-04 DIAGNOSIS — Z91.040: ICD-10-CM

## 2019-07-04 DIAGNOSIS — Z88.0: ICD-10-CM

## 2019-07-04 DIAGNOSIS — Z79.84: ICD-10-CM

## 2019-07-04 DIAGNOSIS — Z79.899: ICD-10-CM

## 2019-07-04 DIAGNOSIS — Z79.82: ICD-10-CM

## 2019-07-04 DIAGNOSIS — Z91.010: ICD-10-CM

## 2019-07-04 DIAGNOSIS — E87.2: ICD-10-CM

## 2019-07-04 DIAGNOSIS — N26.1: ICD-10-CM

## 2019-07-04 DIAGNOSIS — Z93.6: ICD-10-CM

## 2019-07-04 DIAGNOSIS — E11.22: ICD-10-CM

## 2019-07-04 DIAGNOSIS — E03.9: ICD-10-CM

## 2019-07-04 DIAGNOSIS — Z88.8: ICD-10-CM

## 2019-07-04 DIAGNOSIS — Z91.048: ICD-10-CM

## 2019-07-04 DIAGNOSIS — R11.0: ICD-10-CM

## 2019-07-04 DIAGNOSIS — I27.20: ICD-10-CM

## 2019-07-04 DIAGNOSIS — J44.9: ICD-10-CM

## 2019-07-04 DIAGNOSIS — N18.2: ICD-10-CM

## 2019-07-04 LAB
ALBUMIN SERPL BCG-MCNC: 2.8 GM/DL (ref 3.2–5.2)
ALT SERPL W P-5'-P-CCNC: 12 U/L (ref 12–78)
BILIRUB SERPL-MCNC: 0.3 MG/DL (ref 0.2–1)
BUN SERPL-MCNC: 58 MG/DL (ref 7–18)
CALCIUM SERPL-MCNC: 7.8 MG/DL (ref 8.8–10.2)
CHLORIDE SERPL-SCNC: 101 MEQ/L (ref 98–107)
CO2 SERPL-SCNC: 17 MEQ/L (ref 21–32)
CREAT SERPL-MCNC: 3.09 MG/DL (ref 0.55–1.3)
EST. AVERAGE GLUCOSE BLD GHB EST-MCNC: 131 MG/DL (ref 60–110)
GFR SERPL CREATININE-BSD FRML MDRD: 15.4 ML/MIN/{1.73_M2} (ref 32–?)
GLUCOSE SERPL-MCNC: 37 MG/DL (ref 70–100)
HCT VFR BLD AUTO: 29.8 % (ref 36–47)
HGB BLD-MCNC: 10.1 G/DL (ref 12–15.5)
MCH RBC QN AUTO: 32.2 PG (ref 27–33)
MCHC RBC AUTO-ENTMCNC: 33.9 G/DL (ref 32–36.5)
MCV RBC AUTO: 94.9 FL (ref 80–96)
PLATELET # BLD AUTO: 218 10^3/UL (ref 150–450)
POTASSIUM SERPL-SCNC: 3.5 MEQ/L (ref 3.5–5.1)
PROT SERPL-MCNC: 6.2 GM/DL (ref 6.4–8.2)
RBC # BLD AUTO: 3.14 10^6/UL (ref 4–5.4)
SODIUM SERPL-SCNC: 130 MEQ/L (ref 136–145)
WBC # BLD AUTO: 10.9 10^3/UL (ref 4–10)

## 2019-07-04 RX ADMIN — SODIUM CHLORIDE SCH MLS/HR: 9 INJECTION, SOLUTION INTRAVENOUS at 18:00

## 2019-07-04 RX ADMIN — MAGNESIUM OXIDE SCH MG: 400 TABLET ORAL at 21:52

## 2019-07-04 RX ADMIN — PANTOPRAZOLE SODIUM SCH MG: 20 TABLET, DELAYED RELEASE ORAL at 21:52

## 2019-07-04 RX ADMIN — INSULIN LISPRO SCH UNITS: 100 INJECTION, SOLUTION INTRAVENOUS; SUBCUTANEOUS at 21:00

## 2019-07-04 RX ADMIN — SODIUM CHLORIDE SCH UNITS: 4.5 INJECTION, SOLUTION INTRAVENOUS at 21:51

## 2019-07-04 NOTE — HPEPDOC
[facility] Medical History & Physical


Date of Admission


Jul 4, 2019


Date of Service:  Jul 4, 2019





History and Physical


CHIEF COMPLAINT: Abdominal pain 





HISTORY OF PRESENT ILLNESS:


Patient is a 79-year-old female with past medical history of left atrophic 

kidney and nephrostomy tube to her right kidney, COPD, pulmonary hypertension, 

DM, hypertension, hypothyroidism is transferred from [facility] for 

MERI. Of note, patient was admitted here in April for MERI and complicated course 

shock requiring pressor support. She also had renal failure in January of this 

year requiring HD and received the R. nephrostomy tube at that time and was 

supposed to have it removed one week ago but did not due to being ill. She 

presented to [facility] this time with nausea/abdominal discomfort and reported 

URI symptoms along with loose stool last week. BUN/Cr noted to be at 58/3.65 

with baseline Cr normal. Nephrostomy tube reportedly appeared to be functioning 

with urine still flowing, CT scan there did not note for any significant 

abnormalities. 





PAST MEDICAL HISTORY:


Refer to [facility]





PAST SURGICAL HISTORY:


R. sided percutaneous nephrostomy tube placement 


Shoulder surgery 





SOCIAL HISTORY:


Denies tobacco, alcohol or illicit drug use. 





FAMILY HISTORY:


COPD and CAD 





ALLERGIES: Please see below.





REVIEW OF SYSTEMS:


10 point review of system negative except as stated in [facility]





HOME MEDICATIONS: Please see below. 





PHYSICAL EXAMINATION:


General: No acute distress,  Alert


Eyes: Normal sclera, EOMI, RAVEN


HENT: Atraumatic, neck supple, moist mucous membranes


Cardiovascular: Normal rate, normal rhythm. No murmurs appreciated. 


Pulmonary: Clear to auscultation b/l, no wheezing


GI: Soft, nontender, nondistended


Skin: Warm and dry


Neuro: CN grossly intact. No focal deficits. Strengths equal b/l. 


Psych: oriented x 3





LABORATORY DATA: See below.





MICROBIOLOGY: Please see below. 





ASSESSMENT AND PLAN: 


1. MERI 


- Suspecting pre-renal due to vomiting/loose stools as nephrostomy tube appeared

to be functioning. 


- Patient has atropic L. kidney and nephrostomy tube to the R. 


- Has had MERI several times this year and had HD in January. 


- Consult nephrology. 


- Gentle IVF support. If not improving, will have urology assess nephrostomy 

tube. 


2. COPD


- PRN nebs. 


- not in exacerbation. 


3. HTN


- Resume home meds. 


4. Hypothyroidism


- Resume Synthroid.


5. DM


- Accuchecks. Insulin sliding scale coverage. 


- Hold home metformin. Takes 5 units Lantus qHS per son, will monitor BS. 


6. AAA


- 4cm on recent admission. 


- To follow with vascular outpatient. 





DVT ppx: HSQ and SCD


Code status: Full code





Home Medications


Scheduled


Acetaminophen (Acetaminophen) 325 Mg Tablet, 325 MG PO ONCE


Amlodipine Besylate (Amlodipine Besylate) 5 Mg Tablet, 5 MG PO DAILY


Aspirin (Aspir 81) 81 Mg Tablet.dr, 81 MG PO DAILY


Atorvastatin Calcium (Atorvastatin Calcium) 20 Mg Tablet, 20 MG PO DAILY


Febuxostat (Uloric) 40 Mg Tablet, 40 MG PO DAILY


Fluticasone/Vilanterol (Breo Ellipta 100-25 Mcg INH) 1 Each Blst.w.dev, 1 PUFF 

INH DAILY


Furosemide (Furosemide) 40 Mg Tablet, 40 MG PO DAILY


Glipizide (Glipizide ER) 10 Mg Tab.er.24, 10 MG PO DAILY


Levothyroxine Sodium (Synthroid) 100 Mcg Tablet, 100 MCG PO DAILY


Linagliptin (Tradjenta) 5 Mg Tablet, 5 MG PO DAILY


Lisinopril (Lisinopril) 20 Mg Tablet, 20 MG PO DAILY


Magnesium Oxide (Magnesium Oxide) 400 Mg Tablet, 400 MG PO BID


Metformin HCl (Metformin HCl ER) 750 Mg Tab.er.24h, 750 MG PO QPM


   TAKE WITH EVENING MEAL 


Metoprolol Succinate (Metoprolol Succinate) 25 Mg Tab.er.24h, 12.5 MG PO DAILY


Pantoprazole Sodium (Pantoprazole Sodium) 20 Mg Tablet.dr, 20 MG PO BID


Paroxetine HCl (Paroxetine) 10 Mg Tablet, 10 MG PO DAILY


Potassium Chloride (Potassium Chloride) 20 Meq Tablet.er, 40 MEQ PO DAILY


Tiotropium Bromide Monohydrate (Spiriva) 18 Mcg Cap.w.dev, 1 INHALATION INH 

DAILY





Scheduled PRN


Albuterol Sulfate (Proair Hfa) 8.5 Gm Hfa.aer.ad, 2 PUFF INH Q4-6 PRN for 

SHORTNESS OF BREATH


Diphenhydramine HCl (Diphenhydramine HCl) 25 Mg Capsule, 25 MG PO Q4H PRN for 

ALLERGY SYMPTOMS


Oxycodone HCl (Oxycodone HCl) 10 Mg Tablet, 10 MG PO Q6H PRN for PAIN





Allergies


Coded Allergies:  


     Penicillins (Unverified  Allergy, Unknown, 5/14/19)


     budesonide (Unverified  Adverse Reaction, Intermediate, 5/14/19)


   


   NOTES AT PHARMACY STATE PATIENT HAD BAD SINUS REACTION


   


   AFTER USING


     formoterol (Unverified  Adverse Reaction, Intermediate, 5/14/19)


   


   NOTES AT PHARMACY STATE PATIENT HAD BAD SINUS REACTION


   


   AFTER USING





A-FIB/CHADSVASC


A-FIB History


Current/History of A-Fib/PAF?:  No











BRIAN LAWSON MD                 Jul 4, 2019 17:29

## 2019-07-05 VITALS — SYSTOLIC BLOOD PRESSURE: 140 MMHG | DIASTOLIC BLOOD PRESSURE: 63 MMHG

## 2019-07-05 VITALS — DIASTOLIC BLOOD PRESSURE: 63 MMHG | SYSTOLIC BLOOD PRESSURE: 148 MMHG

## 2019-07-05 VITALS — SYSTOLIC BLOOD PRESSURE: 135 MMHG | DIASTOLIC BLOOD PRESSURE: 60 MMHG

## 2019-07-05 VITALS — SYSTOLIC BLOOD PRESSURE: 158 MMHG | DIASTOLIC BLOOD PRESSURE: 67 MMHG

## 2019-07-05 VITALS — SYSTOLIC BLOOD PRESSURE: 135 MMHG | DIASTOLIC BLOOD PRESSURE: 68 MMHG

## 2019-07-05 VITALS — DIASTOLIC BLOOD PRESSURE: 62 MMHG | SYSTOLIC BLOOD PRESSURE: 122 MMHG

## 2019-07-05 LAB
BUN SERPL-MCNC: 52 MG/DL (ref 7–18)
CALCIUM SERPL-MCNC: 8 MG/DL (ref 8.8–10.2)
CHLORIDE SERPL-SCNC: 107 MEQ/L (ref 98–107)
CO2 SERPL-SCNC: 16 MEQ/L (ref 21–32)
CREAT SERPL-MCNC: 2.43 MG/DL (ref 0.55–1.3)
GFR SERPL CREATININE-BSD FRML MDRD: 20.3 ML/MIN/{1.73_M2} (ref 32–?)
GLUCOSE SERPL-MCNC: 83 MG/DL (ref 70–100)
HCT VFR BLD AUTO: 29.7 % (ref 36–47)
HGB BLD-MCNC: 9.9 G/DL (ref 12–15.5)
MCH RBC QN AUTO: 30.7 PG (ref 27–33)
MCHC RBC AUTO-ENTMCNC: 33.3 G/DL (ref 32–36.5)
MCV RBC AUTO: 92 FL (ref 80–96)
PLATELET # BLD AUTO: 256 10^3/UL (ref 150–450)
POTASSIUM SERPL-SCNC: 3.8 MEQ/L (ref 3.5–5.1)
RBC # BLD AUTO: 3.23 10^6/UL (ref 4–5.4)
SODIUM SERPL-SCNC: 135 MEQ/L (ref 136–145)
WBC # BLD AUTO: 8.8 10^3/UL (ref 4–10)

## 2019-07-05 RX ADMIN — SODIUM CHLORIDE SCH UNITS: 4.5 INJECTION, SOLUTION INTRAVENOUS at 21:11

## 2019-07-05 RX ADMIN — SODIUM CHLORIDE SCH UNITS: 4.5 INJECTION, SOLUTION INTRAVENOUS at 06:22

## 2019-07-05 RX ADMIN — MORPHINE SULFATE PRN MG: 4 INJECTION INTRAVENOUS at 10:09

## 2019-07-05 RX ADMIN — INSULIN LISPRO SCH UNITS: 100 INJECTION, SOLUTION INTRAVENOUS; SUBCUTANEOUS at 21:00

## 2019-07-05 RX ADMIN — ASPIRIN SCH MG: 81 TABLET ORAL at 08:59

## 2019-07-05 RX ADMIN — TIOTROPIUM BROMIDE SCH INHALATION: 18 CAPSULE ORAL; RESPIRATORY (INHALATION) at 07:47

## 2019-07-05 RX ADMIN — SODIUM CHLORIDE SCH MLS/HR: 9 INJECTION, SOLUTION INTRAVENOUS at 19:47

## 2019-07-05 RX ADMIN — MAGNESIUM OXIDE SCH MG: 400 TABLET ORAL at 21:11

## 2019-07-05 RX ADMIN — PANTOPRAZOLE SODIUM SCH MG: 20 TABLET, DELAYED RELEASE ORAL at 08:59

## 2019-07-05 RX ADMIN — ATORVASTATIN CALCIUM SCH MG: 20 TABLET, FILM COATED ORAL at 08:59

## 2019-07-05 RX ADMIN — INSULIN LISPRO SCH UNITS: 100 INJECTION, SOLUTION INTRAVENOUS; SUBCUTANEOUS at 07:30

## 2019-07-05 RX ADMIN — SODIUM CHLORIDE SCH UNITS: 4.5 INJECTION, SOLUTION INTRAVENOUS at 13:34

## 2019-07-05 RX ADMIN — INSULIN LISPRO SCH UNITS: 100 INJECTION, SOLUTION INTRAVENOUS; SUBCUTANEOUS at 18:06

## 2019-07-05 RX ADMIN — HYDROCODONE BITARTRATE AND ACETAMINOPHEN PRN TAB: 5; 325 TABLET ORAL at 15:56

## 2019-07-05 RX ADMIN — ALLOPURINOL SCH MG: 100 TABLET ORAL at 08:58

## 2019-07-05 RX ADMIN — LEVOTHYROXINE SODIUM SCH MCG: 100 TABLET ORAL at 06:23

## 2019-07-05 RX ADMIN — METOPROLOL SUCCINATE SCH MG: 25 TABLET, EXTENDED RELEASE ORAL at 08:59

## 2019-07-05 RX ADMIN — HYDROCODONE BITARTRATE AND ACETAMINOPHEN PRN TAB: 5; 325 TABLET ORAL at 23:52

## 2019-07-05 RX ADMIN — SODIUM CHLORIDE SCH MLS/HR: 9 INJECTION, SOLUTION INTRAVENOUS at 07:41

## 2019-07-05 RX ADMIN — MORPHINE SULFATE PRN MG: 4 INJECTION INTRAVENOUS at 13:36

## 2019-07-05 RX ADMIN — MAGNESIUM OXIDE SCH MG: 400 TABLET ORAL at 08:59

## 2019-07-05 RX ADMIN — PANTOPRAZOLE SODIUM SCH MG: 20 TABLET, DELAYED RELEASE ORAL at 21:11

## 2019-07-05 RX ADMIN — INSULIN LISPRO SCH UNITS: 100 INJECTION, SOLUTION INTRAVENOUS; SUBCUTANEOUS at 13:35

## 2019-07-05 RX ADMIN — HYDROCODONE BITARTRATE AND ACETAMINOPHEN PRN TAB: 5; 325 TABLET ORAL at 06:23

## 2019-07-05 NOTE — IPNPDOC
Date Seen


The patient was seen on 7/5/19.





Progress Note


SUBJECTIVE: 


Patient reports pain this morning at multiple sites including her abdomen, R. 

hip, and back. 


Stated that she fell and had a hairline fracture and it has been hurting for the

past 2 years however. 


Denies any other complaints. States that she has not had a bowel movement.


Cr improving with IVF resuscitation. 





OBJECTIVE


PHYSICAL EXAMINATION:


VITAL SIGNS: Please see below. 


General: No acute distress,  Alert, hard of hearing and some dementia


Eyes: Normal sclera, EOMI, RAVEN


HENT: Atraumatic, neck supple, moist mucous membranes


Cardiovascular: Normal rate, normal rhythm. No murmurs appreciated. 


Pulmonary: Clear to auscultation b/l, no wheezing


GI: Soft, nondistended, generalized tenderness


Skin: Warm and dry


Neuro: CN grossly intact. No focal deficits. Strengths equal b/l. 


Psych: Has some dementia and does not answer all questions appropriately 





LABORATORY DATA, IMAGING STUDIES, MICROBIOLOGY: Please see below.





ASSESSMENT AND PLAN: 


1. 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


- Suspecting pre-renal due to vomiting/loose stools as nephrostomy tube appeared

to be functioning. 


- Patient has atropic L. kidney and nephrostomy tube to the R. 


- Has had MERI several times this year and had HD in January. 


- Nephrology following.  


- Gentle IVF support.


2. COPD


- PRN nebs. 


- not in exacerbation. 


3. HTN


- Resume home meds hold nephrotoxic meds. 


4. Hypothyroidism


- Resume Synthroid.


5. DM


- Accuchecks. Insulin sliding scale coverage. 


- Hold home metformin. Takes 5 units Lantus qHS per son, will monitor BS. 


6. AAA


- 4cm on recent admission. 


- To follow with vascular outpatient. 





DVT ppx: HSQ and SCD


Code status: Full code





VS, I&O, 24H, Fishbone


Vital Signs/I&O





Vital Signs








  Date Time  Temp Pulse Resp B/P (MAP) Pulse Ox O2 Delivery O2 Flow Rate FiO2


 


7/5/19 14:00 96.5 95 20 140/63 (88) 98   














I&O- Last 24 Hours up to 6 AM 


 


 7/5/19





 06:00


 


Intake Total 1025 ml


 


Output Total 805 ml


 


Balance 220 ml











Laboratory Data


24H LABS


Laboratory Tests 2


7/4/19 18:01: 


Nucleated Red Blood Cells % (auto) 0.0, Anion Gap 12, Glomerular Filtration Rate

15.4L, Blood Urea Nitrogen 58H, Creatinine 3.09H, Sodium Level 130L, Potassium 

Level 3.5, Chloride Level 101, Carbon Dioxide Level 17L, Calcium Level 7.8L, 

Aspartate Amino Transf (AST/SGOT) 14, Alanine Aminotransferase (ALT/SGPT) 12, 

Alkaline Phosphatase 105, Total Bilirubin 0.3, Total Protein 6.2L, Albumin 2.8L,

Albumin/Globulin Ratio 0.82L


7/4/19 18:02: Lactic Acid Level 0.6


7/4/19 19:11: Bedside Glucose (Misc Panel) 40L


7/4/19 19:36: Bedside Glucose (Misc Panel) 86


7/4/19 19:58: 


Estimated Mean Plasma Glucose 131H, Hemoglobin A1c 6.2


7/4/19 20:54: Bedside Glucose (Misc Panel) 109


7/5/19 05:28: 


Nucleated Red Blood Cells % (auto) 0.0, Anion Gap 12, Glomerular Filtration Rate

20.3L, Blood Urea Nitrogen 52H, Creatinine 2.43H, Sodium Level 135L, Potassium 

Level 3.8, Chloride Level 107, Carbon Dioxide Level 16L, Calcium Level 8.0L


7/5/19 11:34: Bedside Glucose (Misc Panel) 159H


CBC/BMP


Laboratory Tests


7/4/19 18:01








Red Blood Count 3.14 L, Mean Corpuscular Volume 94.9, Mean Corpuscular Hemog

lobin 32.2, Mean Corpuscular Hemoglobin Concent 33.9, Red Cell Distribution 

Width 16.3 H, Calcium Level 7.8 L, Aspartate Amino Transf (AST/SGOT) 14, Alanine

Aminotransferase (ALT/SGPT) 12, Alkaline Phosphatase 105, Total Bilirubin 0.3, 

Total Protein 6.2 L, Albumin 2.8 L





7/5/19 05:28








Red Blood Count 3.23 L, Mean Corpuscular Volume 92.0, Mean Corpuscular 

Hemoglobin 30.7, Mean Corpuscular Hemoglobin Concent 33.3, Red Cell Distribution

Width 16.0 H, Calcium Level 8.0 L











BRIAN LAWSON MD                 Jul 5, 2019 16:13

## 2019-07-05 NOTE — CR
DATE OF CONSULTATION:  07/05/2019

 

REQUESTING PHYSICIAN: Jarett Ballard MD

 

CONSULTING PHYSICIAN: Luis Carlos Mallory MD

 

REASON FOR CONSULTATION: Management of acute kidney injury.

 

CHIEF COMPLAINT:

The patient presented to the hospital yesterday with abdominal pain.

 

HISTORY OF PRESENT ILLNESS

Jennie Cordoba is an 79-year-old female with past medical history of chronic kidney

disease, stage II with a baseline creatinine of 0.9.  She has atrophic left

kidney and has a percutaneous nephrostomy tube in the right kidney. Multiple

other comorbidities as mentioned below.  She was transferred yesterday from

[facility] because of acute renal failure.  The patient presented at

[facility] with nausea, abdominal pain, upper respiratory symptoms.

Further evaluation in the ER showed that she had a creatinine of 3.6.  Since she

has only one functional kidney with a nephrostomy tube and a history of acute

renal failure requiring hemodialysis back in January 2019, the patient was

transferred from [place] to [facility] for further management of

the acute renal failure.  She was admitted under the hospitalist service

yesterday.  The patient was discussed by myself with the admitting physician. The

decision was made to gently hydrate the patient.

 

I saw and evaluated the patient today morning at the bedside.  She is feeling

better today.  She was able to eat her breakfast and she reports that her

abdominal pain is improving. Her renal function is also improving with the IV

fluid hydration.

 

PAST MEDICAL HISTORY

Past medical history of her chronic kidney disease stage II, history of renal

failure requiring dialysis six month ago in January 2019; solitary functioning

right kidney with percutaneous nephrostomy, history of atrophic left kidney,

chronic obstructive pulmonary disease (COPD), primary hypertension, diabetes

mellitus type 2, hypertension, hypothyroidism.

 

PAST SURGICAL HISTORY

Status post right-sided percutaneous nephrostomy, history of shoulder surgery.

 

ALLERGIES: She is allergic to PENICILLIN, LATEX, PEANUT OIL, RED DYE, BUDESONIDE.

 

 

FAMILY HISTORY: No significant family history of end-stage renal disease

requiring hemodialysis.

 

SOCIAL HISTORY: There is no history smoking, illicit drug abuse, alcohol abuse.

 

REVIEW OF SYSTEMS

Constitutional: She denies any fevers or chills.

Eyes:  She denies any blurry vision, double vision.

ENT:  She reports that she is very hard of hearing.

Cardiovascular:  She denies any chest pain or palpitation.

Respiratory:  She denies any shortness of breath.

GI:  She did report abdominal pain when she presented to the hospital.  She

reports pain is getting better.

Genitourinary:  She denies any dysuria or hematuria.  She has history of

right-sided nephrostomy.

Musculoskeletal:  She denies any muscle weakness.

Skin:  She denies any rashes or ulcers.

CNS:  She denies any strokes or seizures.

Hematology/Oncological:  She denies any easy bleeding or bruising.

All other review of systems is negative.

 

PHYSICAL EXAMINATION

General:  The patient is awake, alert, oriented times three, sitting up in the

bed.

Vital signs: Temperature is 97.4 degrees Fahrenheit, blood pressure 135/60, pulse

is 95, respiratory rate of 20, saturating 96% on room air.  Intake and output:

Urine output recorded 350 mL yesterday,  455 mL so far today since overnight.

Head and neck examination: Extraocular muscles intact.  Pupils equally round and

reactive to light.  Mucous membranes are moist.  Neck is supple.  There is no

JVD.  Cardiovascular:  S1, S2, regular rate.  No edema of the bilateral lower

extremities.  Respiratory:  Chest is clear to auscultation bilaterally.

Bilateral equal air entry. No rales or rhonchi.

Abdomen:  Soft.  Positive bowel sounds.  Nontender.  No organomegaly.

Musculoskeletal:  No clubbing or cyanosis.  Pulses are 2+.

CNS:  No focal deficit.  Power is 5/5 in all extremities.  She is otherwise very

hard of hearing.

 

LAB REVIEW:

CBC showed a WBC of 8.8, hemoglobin 9.9, platelets are 256.  BMP on arrival

showed sodium 130, potassium 3.5, chloride 101, bicarb 17, BUN 58, creatinine is

3, calcium 7.8, albumin 2.8.  Repeat BMP done today morning showed sodium 135,

potassium 3.8, chloride 107, bicarb 16, BUN 52, creatinine is 2.4, calcium is 8.

 

HOME MEDICATIONS:

The patient was taking Tylenol as needed (p.r.n.) at home.  She was on

allopurinol 100 mg daily, amlodipine 5 mg daily, aspirin 81 mg daily, Lipitor 20

mg daily, Breo Ellipta one puff once a day, Lasix 40 mg daily, glipizide 10 mg

daily, levothyroxine 100 mcg by mouth daily, Tradjenta 5 mg by mouth  daily,

lisinopril 20 mg daily, magnesium oxide 400 mg by mouth twice a day, metformin

750 mg by mouth daily, metoprolol 25 mg daily, Protonix 20 mg by mouth twice a

day, paroxetine 10 mg daily and potassium chloride 40 mEq by mouth twice a day,

along with Spiriva daily.

 

CURRENT INPATIENT MEDICATIONS:

The patient's medications in the hospital include normal saline 75 mL an hour,

Norco as needed, Tylenol as needed, albuterol nebulizations, allopurinol 100 mg

daily, amlodipine 5 mg daily, aspirin 81 mg daily, Lipitor 20 mg daily, heparin

subcutaneous, levothyroxine 100 mcg by mouth daily, magnesium oxide 400 mg by

mouth twice a day, metoprolol XL 25 mg daily, Protonix 20 mg by mouth twice a

day, Spiriva inhalation and morphine as needed.

 

ASSESSMENT

79-year-old female with near-normal renal function.  Baseline creatinine is 0.9,

solitary functioning right kidney with percutaneous nephrostomy admitted this

time with acute kidney injury superimposed on chronic kidney disease stage II and

abdominal pain.

 

PLAN

1.  Acute renal failure superimposed on chronic kidney disease stage II.  The

patient came in with dehydration and volume depleted.  She was also taking Lasix,

lisinopril and metformin at home.  She was started on gentle IV fluid hydration.

Above-mentioned medications were stopped.  Renal function is improving,

creatinine is down to 2.4.  Continue gentle IV fluid hydration for the next 24

hours as well.

2.  History of hypertension.  The patient is currently on home medications

including amlodipine and metoprolol.  Blood pressures are within the acceptable

range.

3.  Right-sided percutaneous nephrostomy status.  Nephrostomy tube is working

well.  Urine in the bag is clear.

4.  Diabetes mellitus type 2.  Avoid use of metformin, acute renal failure.  Okay

to use a sulfonylureas or insulin sliding scale.

5.  History of abdominal aortic aneurysm.  Avoid use of hydralazine for blood

pressure.  Try to maintain blood pressure less than 140 systolic.  Rest of the

management is as per primary team.

6.  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  Continue current dose of

allopurinol 100 mg by mouth daily.

 

Thank you for involving me in the care of this patient.  I shall be happy to

follow the patient along with you tomorrow morning.

## 2019-07-06 VITALS — SYSTOLIC BLOOD PRESSURE: 140 MMHG | DIASTOLIC BLOOD PRESSURE: 62 MMHG

## 2019-07-06 VITALS — SYSTOLIC BLOOD PRESSURE: 120 MMHG | DIASTOLIC BLOOD PRESSURE: 50 MMHG

## 2019-07-06 VITALS — DIASTOLIC BLOOD PRESSURE: 74 MMHG | SYSTOLIC BLOOD PRESSURE: 157 MMHG

## 2019-07-06 VITALS — DIASTOLIC BLOOD PRESSURE: 98 MMHG | SYSTOLIC BLOOD PRESSURE: 154 MMHG

## 2019-07-06 VITALS — SYSTOLIC BLOOD PRESSURE: 116 MMHG | DIASTOLIC BLOOD PRESSURE: 71 MMHG

## 2019-07-06 LAB
BUN SERPL-MCNC: 34 MG/DL (ref 7–18)
CALCIUM SERPL-MCNC: 7.9 MG/DL (ref 8.8–10.2)
CHLORIDE SERPL-SCNC: 114 MEQ/L (ref 98–107)
CO2 SERPL-SCNC: 18 MEQ/L (ref 21–32)
CREAT SERPL-MCNC: 1.21 MG/DL (ref 0.55–1.3)
GFR SERPL CREATININE-BSD FRML MDRD: 45.5 ML/MIN/{1.73_M2} (ref 32–?)
GLUCOSE SERPL-MCNC: 103 MG/DL (ref 70–100)
HCT VFR BLD AUTO: 26.9 % (ref 36–47)
HGB BLD-MCNC: 9 G/DL (ref 12–15.5)
IRON SATN MFR SERPL: 34.8 % (ref 13.2–45)
IRON SERPL-MCNC: 47 UG/DL (ref 50–170)
MCH RBC QN AUTO: 31.1 PG (ref 27–33)
MCHC RBC AUTO-ENTMCNC: 33.5 G/DL (ref 32–36.5)
MCV RBC AUTO: 93.1 FL (ref 80–96)
PLATELET # BLD AUTO: 224 10^3/UL (ref 150–450)
POTASSIUM SERPL-SCNC: 3.7 MEQ/L (ref 3.5–5.1)
RBC # BLD AUTO: 2.89 10^6/UL (ref 4–5.4)
SODIUM SERPL-SCNC: 140 MEQ/L (ref 136–145)
TIBC SERPL-MCNC: 135 UG/DL (ref 250–450)
WBC # BLD AUTO: 5.3 10^3/UL (ref 4–10)

## 2019-07-06 RX ADMIN — TIOTROPIUM BROMIDE SCH INHALATION: 18 CAPSULE ORAL; RESPIRATORY (INHALATION) at 08:17

## 2019-07-06 RX ADMIN — SODIUM CHLORIDE SCH UNITS: 4.5 INJECTION, SOLUTION INTRAVENOUS at 06:29

## 2019-07-06 RX ADMIN — PANTOPRAZOLE SODIUM SCH MG: 20 TABLET, DELAYED RELEASE ORAL at 20:14

## 2019-07-06 RX ADMIN — MAGNESIUM OXIDE SCH MG: 400 TABLET ORAL at 08:33

## 2019-07-06 RX ADMIN — HYDROCODONE BITARTRATE AND ACETAMINOPHEN PRN TAB: 5; 325 TABLET ORAL at 16:34

## 2019-07-06 RX ADMIN — SODIUM CHLORIDE SCH MLS/HR: 9 INJECTION, SOLUTION INTRAVENOUS at 08:34

## 2019-07-06 RX ADMIN — INSULIN LISPRO SCH UNITS: 100 INJECTION, SOLUTION INTRAVENOUS; SUBCUTANEOUS at 11:58

## 2019-07-06 RX ADMIN — METOPROLOL SUCCINATE SCH MG: 25 TABLET, EXTENDED RELEASE ORAL at 08:34

## 2019-07-06 RX ADMIN — SODIUM CHLORIDE SCH UNITS: 4.5 INJECTION, SOLUTION INTRAVENOUS at 13:38

## 2019-07-06 RX ADMIN — SODIUM CHLORIDE SCH UNITS: 4.5 INJECTION, SOLUTION INTRAVENOUS at 21:03

## 2019-07-06 RX ADMIN — DOCUSATE SODIUM SCH MG: 100 CAPSULE, LIQUID FILLED ORAL at 13:00

## 2019-07-06 RX ADMIN — ALLOPURINOL SCH MG: 100 TABLET ORAL at 08:34

## 2019-07-06 RX ADMIN — INSULIN LISPRO SCH UNITS: 100 INJECTION, SOLUTION INTRAVENOUS; SUBCUTANEOUS at 08:35

## 2019-07-06 RX ADMIN — SODIUM CHLORIDE SCH MLS/HR: 9 INJECTION, SOLUTION INTRAVENOUS at 20:15

## 2019-07-06 RX ADMIN — ASPIRIN SCH MG: 81 TABLET ORAL at 08:36

## 2019-07-06 RX ADMIN — DOCUSATE SODIUM SCH MG: 100 CAPSULE, LIQUID FILLED ORAL at 20:14

## 2019-07-06 RX ADMIN — MAGNESIUM HYDROXIDE SCH ML: 400 SUSPENSION ORAL at 09:00

## 2019-07-06 RX ADMIN — MAGNESIUM HYDROXIDE SCH ML: 400 SUSPENSION ORAL at 20:15

## 2019-07-06 RX ADMIN — PANTOPRAZOLE SODIUM SCH MG: 20 TABLET, DELAYED RELEASE ORAL at 08:33

## 2019-07-06 RX ADMIN — ATORVASTATIN CALCIUM SCH MG: 20 TABLET, FILM COATED ORAL at 08:33

## 2019-07-06 RX ADMIN — HYDROCODONE BITARTRATE AND ACETAMINOPHEN PRN TAB: 5; 325 TABLET ORAL at 08:33

## 2019-07-06 RX ADMIN — INSULIN LISPRO SCH UNITS: 100 INJECTION, SOLUTION INTRAVENOUS; SUBCUTANEOUS at 20:51

## 2019-07-06 RX ADMIN — MAGNESIUM OXIDE SCH MG: 400 TABLET ORAL at 20:14

## 2019-07-06 RX ADMIN — LEVOTHYROXINE SODIUM SCH MCG: 100 TABLET ORAL at 06:29

## 2019-07-06 RX ADMIN — INSULIN LISPRO SCH UNITS: 100 INJECTION, SOLUTION INTRAVENOUS; SUBCUTANEOUS at 17:21

## 2019-07-06 NOTE — IPNPDOC
Date Seen


The patient was seen on 7/6/19.





Progress Note


SUBJECTIVE: 


Patient seem comfortable this morning and is more responsive. 


States that her pains are better. 


Although has not had a bowel movement yet. 


Cr continues to trend down. 





OBJECTIVE


PHYSICAL EXAMINATION:


VITAL SIGNS: Please see below. 


General: No acute distress,  Alert, hard of hearing and some dementia


Eyes: Normal sclera, EOMI, RAVEN


HENT: Atraumatic, neck supple, moist mucous membranes


Cardiovascular: Normal rate, normal rhythm. No murmurs appreciated. 


Pulmonary: Clear to auscultation b/l, no wheezing


GI: Soft, nondistended, generalized tenderness


Skin: Warm and dry


Neuro: CN grossly intact. No focal deficits. Strengths equal b/l. 


Psych: Has some dementia and does not answer all questions appropriately 





LABORATORY DATA, IMAGING STUDIES, MICROBIOLOGY: Please see below.





ASSESSMENT AND PLAN: 


1. 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


- Improving. 


- Suspecting pre-renal due to vomiting/loose stools as nephrostomy tube appeared

to be functioning. 


- Patient has atropic L. kidney and nephrostomy tube to the R. 


- Has had MERI several times this year and had HD in January. 


- Nephrology following.  


2. COPD


- PRN nebs. 


- not in exacerbation. 


3. HTN


- Resume home meds hold nephrotoxic meds. 


4. Hypothyroidism


- Resume Synthroid.


5. DM


- Accuchecks. Insulin sliding scale coverage. 


- Hold home metformin. Takes 5 units Lantus qHS per son, will monitor BS. 


6. AAA


- 4cm on recent admission. 


- To follow with vascular outpatient. 


7. Diffuse pain/abdominal pain


- suspect constipation. CT with no significant findings at [facility]. 


- Has not had a bowel movement in 6 days. Start on bowel regimen. 





DVT ppx: HSQ and SCD


Code status: Full code





VS, I&O, 24H, Fishbone


Vital Signs/I&O





Vital Signs








  Date Time  Temp Pulse Resp B/P (MAP) Pulse Ox O2 Delivery O2 Flow Rate FiO2


 


7/6/19 10:00 97.2 98 20 140/62 (88) 99   














I&O- Last 24 Hours up to 6 AM 


 


 7/6/19





 06:00


 


Intake Total 2309 ml


 


Output Total 800 ml


 


Balance 1509 ml











Laboratory Data


24H LABS


Laboratory Tests 2


7/5/19 17:54: Bedside Glucose (Misc Panel) 101


7/5/19 20:59: Bedside Glucose (Misc Panel) 119H


7/6/19 05:53: 


Nucleated Red Blood Cells % (auto) 0.0, Anion Gap 8, Glomerular Filtration Rate 

45.5, Blood Urea Nitrogen 34H, Creatinine 1.21#, Sodium Level 140, Potassium 

Level 3.7, Chloride Level 114H, Carbon Dioxide Level 18L, Calcium Level 7.9L


7/6/19 11:44: Bedside Glucose (Misc Panel) 165H


CBC/BMP


Laboratory Tests


7/6/19 05:53








Red Blood Count 2.89 L, Mean Corpuscular Volume 93.1, Mean Corpuscular 

Hemoglobin 31.1, Mean Corpuscular Hemoglobin Concent 33.5, Red Cell Distribution

Width 15.9 H, Calcium Level 7.9 L











BRIAN LAWSON MD                 Jul 6, 2019 12:18

## 2019-07-07 VITALS — SYSTOLIC BLOOD PRESSURE: 166 MMHG | DIASTOLIC BLOOD PRESSURE: 77 MMHG

## 2019-07-07 VITALS — SYSTOLIC BLOOD PRESSURE: 137 MMHG | DIASTOLIC BLOOD PRESSURE: 86 MMHG

## 2019-07-07 VITALS — SYSTOLIC BLOOD PRESSURE: 127 MMHG | DIASTOLIC BLOOD PRESSURE: 77 MMHG

## 2019-07-07 VITALS — DIASTOLIC BLOOD PRESSURE: 70 MMHG | SYSTOLIC BLOOD PRESSURE: 124 MMHG

## 2019-07-07 LAB
BUN SERPL-MCNC: 17 MG/DL (ref 7–18)
CALCIUM SERPL-MCNC: 8 MG/DL (ref 8.8–10.2)
CHLORIDE SERPL-SCNC: 115 MEQ/L (ref 98–107)
CO2 SERPL-SCNC: 19 MEQ/L (ref 21–32)
CREAT SERPL-MCNC: 0.87 MG/DL (ref 0.55–1.3)
GFR SERPL CREATININE-BSD FRML MDRD: > 60 ML/MIN/{1.73_M2} (ref 32–?)
GLUCOSE SERPL-MCNC: 96 MG/DL (ref 70–100)
HCT VFR BLD AUTO: 27.3 % (ref 36–47)
HGB BLD-MCNC: 9.3 G/DL (ref 12–15.5)
MCH RBC QN AUTO: 32.3 PG (ref 27–33)
MCHC RBC AUTO-ENTMCNC: 34.1 G/DL (ref 32–36.5)
MCV RBC AUTO: 94.8 FL (ref 80–96)
PLATELET # BLD AUTO: 220 10^3/UL (ref 150–450)
POTASSIUM SERPL-SCNC: 3.6 MEQ/L (ref 3.5–5.1)
RBC # BLD AUTO: 2.88 10^6/UL (ref 4–5.4)
SODIUM SERPL-SCNC: 142 MEQ/L (ref 136–145)
WBC # BLD AUTO: 5.1 10^3/UL (ref 4–10)

## 2019-07-07 RX ADMIN — TIOTROPIUM BROMIDE SCH INHALATION: 18 CAPSULE ORAL; RESPIRATORY (INHALATION) at 08:15

## 2019-07-07 RX ADMIN — ASPIRIN SCH MG: 81 TABLET ORAL at 09:02

## 2019-07-07 RX ADMIN — PANTOPRAZOLE SODIUM SCH MG: 20 TABLET, DELAYED RELEASE ORAL at 09:02

## 2019-07-07 RX ADMIN — ATORVASTATIN CALCIUM SCH MG: 20 TABLET, FILM COATED ORAL at 09:02

## 2019-07-07 RX ADMIN — SODIUM CHLORIDE SCH MLS/HR: 9 INJECTION, SOLUTION INTRAVENOUS at 09:01

## 2019-07-07 RX ADMIN — SODIUM CHLORIDE SCH UNITS: 4.5 INJECTION, SOLUTION INTRAVENOUS at 05:58

## 2019-07-07 RX ADMIN — LEVOTHYROXINE SODIUM SCH MCG: 100 TABLET ORAL at 05:58

## 2019-07-07 RX ADMIN — HYDROCODONE BITARTRATE AND ACETAMINOPHEN PRN TAB: 5; 325 TABLET ORAL at 01:01

## 2019-07-07 RX ADMIN — HYDROCODONE BITARTRATE AND ACETAMINOPHEN PRN TAB: 5; 325 TABLET ORAL at 09:03

## 2019-07-07 RX ADMIN — MAGNESIUM HYDROXIDE SCH ML: 400 SUSPENSION ORAL at 09:02

## 2019-07-07 RX ADMIN — INSULIN LISPRO SCH UNITS: 100 INJECTION, SOLUTION INTRAVENOUS; SUBCUTANEOUS at 11:48

## 2019-07-07 RX ADMIN — INSULIN LISPRO SCH UNITS: 100 INJECTION, SOLUTION INTRAVENOUS; SUBCUTANEOUS at 07:27

## 2019-07-07 RX ADMIN — METOPROLOL SUCCINATE SCH MG: 25 TABLET, EXTENDED RELEASE ORAL at 09:07

## 2019-07-07 RX ADMIN — ALLOPURINOL SCH MG: 100 TABLET ORAL at 09:02

## 2019-07-07 RX ADMIN — DOCUSATE SODIUM SCH MG: 100 CAPSULE, LIQUID FILLED ORAL at 09:02

## 2019-07-07 RX ADMIN — MAGNESIUM OXIDE SCH MG: 400 TABLET ORAL at 09:02

## 2019-07-07 NOTE — REP
Clinical:  Chest pain.  Dyspnea.

 

Comparison:  04/18/2019.

 

Findings:

Mediastinum and cardiac silhouette are within normal limits and stable.  Lung

fields demonstrate chronic changes including linear scarring at the bilateral

bases.  No focal consolidation, effusion, or pneumothorax.  Skeletal structures

demonstrate stable osteopenia and degenerative changes.

 

Impression:

Chronic stable changes.

No acute cardiopulmonary process appreciated.

 

 

Electronically Signed by

Star Murphy MD 07/07/2019 09:58 A

## 2019-07-07 NOTE — DS.PDOC
Discharge Summary


General


Date of Admission


Jul 4, 2019 at 17:15


Date of Discharge


7/7/19





Discharge Summary


PROCEDURES PERFORMED DURING STAY: [None].





ADMITTING DIAGNOSES: 


1. MERI


2. COPD


3. HTN


4. DM


5. AAA


6. Abdominal pain 





DISCHARGE DIAGNOSES:


1. MERI


2. COPD


3. HTN


4. DM


5. AAA


6. Abdominal pain 





COMPLICATIONS/CHIEF COMPLAINT: Acute Kidney Injury.





HISTORY OF PRESENT ILLNESS: 


"Patient is a 79-year-old female with past medical history of left atrophic 

kidney and nephrostomy tube to her right kidney, COPD, pulmonary hypertension, 

DM, hypertension, hypothyroidism is transferred from [facility] for 

MERI. Of note, patient was admitted here in April for MERI and complicated course 

shock requiring pressor support. She also had renal failure in January of this 

year requiring HD and received the R. nephrostomy tube at that time and was 

supposed to have it removed one week ago but did not due to being ill. She 

presented to [facility] this time with nausea/abdominal discomfort and reported U

RI symptoms along with loose stool last week. BUN/Cr noted to be at 58/3.65 with

 baseline Cr normal. Nephrostomy tube reportedly appeared to be functioning with

 urine still flowing, CT scan there did not note for any significant 

abnormalities. "





HOSPITAL COURSE:


Patient was given gentle IVF with quick resolution of MERI and kidney functions 

had returned to normal. Also noted to not have bowel movements for several days 

complaining of severe abdominal pain. Was started on stool softener and had a 

small bowel movement yesterday, today denies any complaints. Stated that she 

feels well and requested to go home, very tearful saying she cannot rest and her

 HR is high because she is in distress being in the hospital. HR had now return 

to normal with no fever or leukocytosis, suggesting any evidence of infection. 

Will discharge patient to f/u with PMD. Recommend continuing with stool 

softener.





DISCHARGE MEDICATIONS: Please see below.


 


ALLERGIES: Please see below.





PHYSICAL EXAMINATION ON DISCHARGE:


VITAL SIGNS: Please see below.


General: No acute distress,  Alert


Eyes: Normal sclera, EOMI, RAVEN


HENT: Atraumatic, neck supple, moist mucous membranes


Cardiovascular: Normal rate, normal rhythm. 


Pulmonary: Clear to auscultation b/l, no wheezing


GI: Soft, nontender, nondistended


Skin: Warm and dry


Neuro: CN grossly intact. No focal deficits. Strengths equal b/l. 





LABORATORY DATA: Please see below.





IMAGING: 


CXR- report pending





ACTIVITY: [As tolerated].





DIET: 2G sodium diet





DISCHARGE PLAN:


c/w stool softener colace


f/u PMD within 1 week





DISPOSITION: Home.





DISCHARGE INSTRUCTIONS:


c/w stool softener colace


f/u PMD within 1 week





ITEMS TO FOLLOWUP ON ON OUTPATIENT:


1. CXR final report, no significant infiltrate per my read. 


(no evidence of PNA or infection, would not need to have any treatment) 





DISCHARGE CONDITION: [Stable].





TIME SPENT ON DISCHARGE: 33 minutes.





Vital Signs/I&Os





Vital Signs








  Date Time  Temp Pulse Resp B/P (MAP) Pulse Ox O2 Delivery O2 Flow Rate FiO2


 


7/7/19 10:00 97.6 99 18 166/77 (106) 97   














I&O- Last 24 Hours up to 6 AM 


 


 7/7/19





 06:00


 


Intake Total 1641 ml


 


Output Total 1500 ml


 


Balance 141 ml











Laboratory Data


Labs 24H


Laboratory Tests 2


7/6/19 16:46: Bedside Glucose (Misc Panel) 128H


7/6/19 20:39: Bedside Glucose (Misc Panel) 113H


7/7/19 06:08: 


Nucleated Red Blood Cells % (auto) 0.4H, Anion Gap 8, Glomerular Filtration Rate

 > 60.0, Blood Urea Nitrogen 17, Creatinine 0.87, Sodium Level 142, Potassium 

Level 3.6, Chloride Level 115H, Carbon Dioxide Level 19L, Calcium Level 8.0L


7/7/19 11:39: Bedside Glucose (Misc Panel) 170H


CBC/BMP


Laboratory Tests


7/7/19 06:08








Red Blood Count 2.88 L, Mean Corpuscular Volume 94.8, Mean Corpuscular 

Hemoglobin 32.3, Mean Corpuscular Hemoglobin Concent 34.1, Red Cell Distribution

 Width 15.9 H, Calcium Level 8.0 L


FSBS





Laboratory Tests








Test


 7/6/19


16:46 7/6/19


20:39 7/7/19


11:39 Range/Units


 


 


Bedside Glucose (Misc Panel) 128 113 170   MG/DL











Discharge Medications


Scheduled


Allopurinol (Allopurinol) 100 Mg Tablet, 100 MG PO DAILY, (Reported)


Amlodipine Besylate (Amlodipine Besylate) 5 Mg Tablet, 5 MG PO DAILY, (Reported)


Aspirin (Aspir 81) 81 Mg Tablet.dr, 81 MG PO DAILY, (Reported)


Atorvastatin Calcium (Atorvastatin Calcium) 20 Mg Tablet, 20 MG PO DAILY, 

(Reported)


Fluticasone/Vilanterol (Breo Ellipta 100-25 Mcg INH) 1 Each Blst.w.dev, 1 PUFF 

INH DAILY, (Reported)


Furosemide (Furosemide) 40 Mg Tablet, 40 MG PO DAILY, (Reported)


Glipizide (Glipizide ER) 10 Mg Tab.er.24, 10 MG PO DAILY, (Reported)


Levothyroxine Sodium (Synthroid) 100 Mcg Tablet, 100 MCG PO DAILY, (Reported)


Linagliptin (Tradjenta) 5 Mg Tablet, 5 MG PO DAILY, (Reported)


Lisinopril (Lisinopril) 20 Mg Tablet, 20 MG PO DAILY, (Reported)


Magnesium Oxide (Magnesium Oxide) 400 Mg Tablet, 400 MG PO BID, (Reported)


Metformin HCl (Metformin HCl ER) 750 Mg Tab.er.24h, 750 MG PO QPM, (Reported)


   TAKE WITH EVENING MEAL 


Metoprolol Succinate (Metoprolol Succinate) 25 Mg Tab.er.24h, 25 MG PO DAILY, 

(Reported)


Pantoprazole Sodium (Pantoprazole Sodium) 20 Mg Tablet.dr, 20 MG PO BID, 

(Reported)


Paroxetine HCl (Paroxetine) 10 Mg Tablet, 10 MG PO DAILY, (Reported)


Potassium Chloride (Potassium Chloride) 20 Meq Tablet.er, 40 MEQ PO BID, (R

eported)


   LAST FILLED DEC 2018, UNABLE TO VERIFY IF PT IS STILL TAKING 


Tiotropium Bromide Monohydrate (Spiriva) 18 Mcg Cap.w.dev, 1 INHALATION INH 

DAILY, (Reported)





Scheduled PRN


Acetaminophen (Acetaminophen) 325 Mg Tablet, 650 MG PO Q6H PRN for PAIN, 

(Reported)


Albuterol Sulfate (Proair Hfa) 8.5 Gm Hfa.aer.ad, 2 PUFF INH Q4-6 PRN for 

SHORTNESS OF BREATH, (Reported)


Diphenhydramine HCl (Diphenhydramine HCl) 25 Mg Capsule, 25 MG PO Q4H PRN for 

ALLERGY SYMPTOMS, (Reported)





Allergies


Coded Allergies:  


     Penicillins (Unverified  Allergy, Unknown, 5/14/19)


     latex (Verified  Allergy, Unknown, 7/4/19)


     peanut oil (Verified  Allergy, Unknown, 7/4/19)


     red dye (Verified  Allergy, Unknown, 7/4/19)


     budesonide (Unverified  Adverse Reaction, Intermediate, 5/14/19)


   


   NOTES AT PHARMACY STATE PATIENT HAD BAD SINUS REACTION


   


   AFTER USING


     formoterol (Unverified  Adverse Reaction, Intermediate, 5/14/19)


   


   NOTES AT PHARMACY STATE PATIENT HAD BAD SINUS REACTION


   


   AFTER USING











BRIAN LAWSON MD                 Jul 7, 2019 11:57

## 2019-07-07 NOTE — IPN
DATE: 07/06/2019

 

Mrs. Cordoba is seen this morning on her bedside.  She is sitting at the edge of

bed eating her lunch.  She denies any nausea, vomiting, dyspnea or chest pain.

 

PHYSICAL EXAMINATION: Temperature 96 degrees Fahrenheit, heart rate 100 per

minute and respiratory rate 16 per minute.  Blood pressure 120/50 mmHg and oxygen

saturation 98%.  Head is atraumatic.  Neck is supple and without JVD or thyroid

enlargement.  Heart:  Sounds are regular and tachycardiac.  Lungs:  Clear to

auscultation.  Abdomen:  Soft and nontender.  Bowel sounds are normal.

Extremities have no cyanosis or clubbing.  Neurologically she is awake and

without a focal deficit.

 

Today's labs show WBC count 5.3, hemoglobin 9.0 and hematocrit 26.9.  Platelets

224.  Sodium 140, potassium 3.7, CO2 18, BUN 34 and creatinine 1.21.

 

PROBLEMS:

1. Acute renal failure.  Kidney function is improving nicely and the patient has

no uremic symptoms.  I would suggest to continue monitoring her renal function on

a daily basis.

2. Metabolic acidosis. Acidosis is improving gradually as her kidney function is

improving.  Chemistry should be checked again tomorrow morning.

3. Anemia.  Her anemia is gradually worsening. I will check her iron studies and

continue to monitor.  There is no emergent indication for a transfusion.

4. Right-sided percutaneous nephrostomy in a solitary kidney.  The patient has a

nephrostomy in her right side and it is draining clear urine.  Continue to

monitor closely.

 

All in all the patient is improving from a renal standpoint and we will monitor

her and follow her for one more day.

## 2019-07-07 NOTE — IPN
DATE:  07/07/2019

 

Mrs. Cordoba is seen this morning on her bedside.  She is laying in her bed without

any acute distress.  She is receiving intravenous (IV) fluid at 75 per hour.  She

was admitted with acute renal failure and kidney function has been improving.

Her oral intake is now adequate and she denies any nausea, vomiting, abdominal

pain or diarrhea.  She has no dyspnea or chest pain.

 

PHYSICAL EXAMINATION:

Temperature 97.6 degrees Fahrenheit, heart rate 99 per minute and respiratory

rate 18 per minute.  Blood pressure 166/77 mmHg and oxygen saturation 97% on room

air.  Head is atraumatic.

Neck is supple and without jugular venous distention (JVD) or thyroid

enlargement.  Heart sounds are regular.

Lungs with slightly diminished breath sounds.

Abdomen soft and nontender and bowel sounds are normal.

Extremities have no cyanosis or clubbing.

 

Today's labs show WBC count 5.1, hemoglobin 9.3 and hematocrit 27.3.

 

Sodium 142, potassium 3.6, CO2 19, BUN 17 and creatinine 0.87.  Calcium level is

8.0.

 

PROBLEMS:

1.  Acute renal failure.  Kidney function has improved back to baseline.  I am

going to stop her IV fluid, as her oral intake is now adequate.

 

2.  Metabolic acidosis.  Acidosis has also improved significantly, though not

completely normal as yet.  We anticipate that her acidosis will correct

completely over next 24-48 hours.  She is not receiving any sodium bicarbonate

supplement.

 

3.  Anemia.  Her anemia is stable and iron studies are borderline.  I would

recommend that she should supplement in the long-term.

 

DISPOSITION:  From a renal standpoint, patient is doing well.  I am signing off

her case.  Her IV fluid is being stopped.

## 2019-07-15 ENCOUNTER — HOSPITAL ENCOUNTER (OUTPATIENT)
Dept: HOSPITAL 53 - M RADPRO | Age: 79
End: 2019-07-15
Attending: NURSE PRACTITIONER
Payer: MEDICARE

## 2019-07-15 VITALS — SYSTOLIC BLOOD PRESSURE: 142 MMHG | DIASTOLIC BLOOD PRESSURE: 64 MMHG

## 2019-07-15 DIAGNOSIS — N13.30: Primary | ICD-10-CM

## 2019-07-16 NOTE — REP
IR Nephrostomy catheter exchangeNephrostogram and Ureterogram.Clinical

Information:  Ureteral stricturePhysician[s]: Dr. Goodrocedure:  The patient

was advised of the benefits, risks, and alternatives of the procedure and

informed consent was obtained.A time out was performed with verification of the

patient's name, MRN, site of procedure, and type of procedure to be performed.

The patient was positioned in the decubitus position on the angiographic table.

The site was prepped and draped in the usual sterile fashion.Moderate sedation

was not required. The physician spent time 30 minutes of continuous face-to-face

time with the patient.A  radiograph reveals a right sided nephrostomy

catheter in expected location.An initial nephrostogram and ureterogram were

performed through the preexisting catheter, confirming the catheter pigtail

position within the renal pelvis.The catheter was cut and an Amplatz wire was

passed into the renal collecting system.  The pre-existing nephrostomy catheter

was removed over the wire.  A new 8.5 Swazi pigtail nephrostomy catheter was

advanced into the renal collecting system.  A final nephrostogram and ureterogram

were performed confirming positioning of the pigtail within the renal pelvis with

mild hydronephrosis.  Ureterogram demonstrates  no hydroureter.  The ureter is

patent to the urinary bladder.The catheter was sutured and a sterile dressing

applied.  The catheter was placed to gravity drainage.The patient tolerated the

procedure well and was returned to the PRU in stable condition.EBL:  < 5

mL.Complications:  NoneConclusion:1. Nephrostogram and ureterogram demonstrate

ureter patent to bladder.2. Successful right sided nephrostomy catheter exchange.

Patient to return in 8-12 weeks for routine exchange.

If the patient would be a suitable candidate for capping trial and ureteral

stenting, please refer to IR.

 

Thank you for this referral

 

 

 

 

Electronically Signed by

Lizbeth Patterson MD 07/15/2019 04:27 P

## 2019-10-07 ENCOUNTER — HOSPITAL ENCOUNTER (OUTPATIENT)
Dept: HOSPITAL 53 - M IRPRO | Age: 79
End: 2019-10-07
Attending: RADIOLOGY
Payer: MEDICARE

## 2019-10-07 VITALS — DIASTOLIC BLOOD PRESSURE: 81 MMHG | SYSTOLIC BLOOD PRESSURE: 171 MMHG

## 2019-10-07 DIAGNOSIS — Z79.82: ICD-10-CM

## 2019-10-07 DIAGNOSIS — Z79.899: ICD-10-CM

## 2019-10-07 DIAGNOSIS — N13.9: Primary | ICD-10-CM

## 2019-10-07 PROCEDURE — 99152 MOD SED SAME PHYS/QHP 5/>YRS: CPT

## 2019-10-07 PROCEDURE — 99153 MOD SED SAME PHYS/QHP EA: CPT

## 2019-10-07 PROCEDURE — 50435 EXCHANGE NEPHROSTOMY CATH: CPT

## 2019-10-07 NOTE — IRHP
[facility] IR Pre-Procedure H & P


General


Date of Service:  Oct 7, 2019


Procedure:  Same Day Surgery





Interval History and Physical


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 last 

nephrostomy capping trial, patient ended up in renal failure. 





No fevers no chills no pain.





O/E vitals stable. right nephrostomy draining clear urine. breathing normal at 

rest. heart rate normal.





History of Present Illness


Chief Complaint


The patient is a 79-year-old female admitted with a reason for visit of Rt Hyd

ronephrosis *$14 Co-Pay*.


PRE-PROCEDURE DIAGNOSIS: obstructive uropathy





ASA Classification


ASA Classification:  III-Severe systemic dis.


Mallampati Score:  I


NPO:  Yes


Problems with prior sedation:  No


Obstructive Sleep Apnea:  No





Plan


moderate sedation





Allergies


Coded Allergies:  


     Penicillins (Unverified  Allergy, Unknown, 5/14/19)


     latex (Verified  Allergy, Unknown, 7/4/19)


     peanut oil (Verified  Allergy, Unknown, 7/4/19)


     red dye (Verified  Allergy, Unknown, 7/4/19)


     budesonide (Unverified  Adverse Reaction, Intermediate, 5/14/19)


   


   NOTES AT PHARMACY STATE PATIENT HAD BAD SINUS REACTION


   


   AFTER USING


     formoterol (Unverified  Adverse Reaction, Intermediate, 5/14/19)


   


   NOTES AT PHARMACY STATE PATIENT HAD BAD SINUS REACTION


   


   AFTER USING





Home Medications


Scheduled


Allopurinol (Allopurinol), 100 MG PO DAILY, (Reported)


Amlodipine Besylate (Amlodipine Besylate), 5 MG PO DAILY, (Reported)


Aspirin (Aspir 81), 81 MG PO DAILY, (Reported)


Atorvastatin Calcium (Atorvastatin Calcium), 20 MG PO DAILY, (Reported)


Docusate Sodium (Colace), 200 MG PO BID


Fluticasone/Vilanterol (Breo Ellipta 100-25 Mcg INH), 1 PUFF INH DAILY, (

Reported)


Furosemide (Furosemide), 40 MG PO DAILY, (Reported)


Glipizide (Glipizide ER), 10 MG PO DAILY, (Reported)


Levothyroxine Sodium (Synthroid), 100 MCG PO DAILY, (Reported)


Linagliptin (Tradjenta), 5 MG PO DAILY, (Reported)


Lisinopril (Lisinopril), 20 MG PO DAILY, (Reported)


Magnesium Oxide (Magnesium Oxide), 400 MG PO BID, (Reported)


Metformin HCl (Metformin HCl ER), 750 MG PO QPM, (Reported)


Metoprolol Succinate (Metoprolol Succinate), 25 MG PO DAILY, (Reported)


Pantoprazole Sodium (Pantoprazole Sodium), 20 MG PO BID, (Reported)


Paroxetine HCl (Paroxetine), 10 MG PO DAILY, (Reported)


Potassium Chloride (Potassium Chloride), 40 MEQ PO BID, (Reported)


Tiotropium Bromide Monohydrate (Spiriva), 1 INHALATION INH DAILY, (Reported)





Scheduled PRN


Acetaminophen (Acetaminophen), 650 MG PO Q6H PRN for PAIN, (Reported)


Albuterol Sulfate (Proair Hfa), 2 PUFF INH Q4-6 PRN for SHORTNESS OF BREATH, 

(Reported)


Diphenhydramine HCl (Diphenhydramine HCl), 25 MG PO Q4H PRN for ALLERGY 

SYMPTOMS, (Reported)





VS, I&O, 24H, Fishbone


Vital Signs/I&O





Vital Signs








  Date Time  Temp Pulse Resp B/P (MAP) Pulse Ox O2 Delivery O2 Flow Rate FiO2


 


10/7/19 15:22  89 20  97   


 


10/7/19 14:50       2 


 


10/7/19 13:38 97.8       

















CHERYL VERMA MD                Oct 7, 2019 15:31

## 2019-10-07 NOTE — REP
IR Nephrostomy catheter exchange.

 

IR Nephrostogram and ureterogram.

 

Clinical information:  Obstructive right uropathy. Last capping trial patient

went into renal failure.

 

Physician:  Dr. Patterson.

 

Procedure:  The patient was advised of the benefits, risks and alternatives of

the procedure and informed consent was obtained.

 

The time-out was performed with verification of the patient's name, MRN, site of

procedure and type of procedure to be performed.  The patient was positioned in

the prone position on the angiographic table.  The site was prepped and draped in

the usual sterile fashion.

 

Moderate sedation was performed by the physician including the presence of an

independent trained observer who assisted in monitoring the patient's level of

consciousness and physiologic status.  Following the administration of Versed and

Fentanyl, the physician spent 30 minutes of continuous face to face time with the

patient.

 

A  radiograph reveals a right-sided nephrostomy catheter in expected

location.

 

An initial nephrostogram and ureterogram was performed through the preexisting

catheter confirming pigtail positioned in the renal collecting system.

 

Lidocaine was injected around the catheter exit site.

 

The catheter and sutures were cut, an Amplatz wire was passed into the renal

collecting system.  The preexisting nephrostomy catheter was removed over the

wire.  A new 8 -Wolof nephrostomy catheter was advanced in the renal collecting

system.  A final nephrostogram and ureterogram was performed confirming position

of the pigtail in the renal pelvis with mild hydronephrosis.  Ureterogram

demonstrates the ureter is patent to the bladder.

 

The catheter was secured in position with 2-0 Prolene and a sterile dressing was

applied.  The catheter was placed gravity drainage.

 

The patient tolerated the procedure well and was returned to the P R U in stable

condition.

 

EBL:  Less than 5 ml.

 

Complications:  None.

 

Conclusion:

1.  Nephrostogram and ureterogram demonstrate mild hydronephrosis.  Ureter

appears patent to the bladder.

2.  Successful right nephrostomy catheter exchange.  Patient to return in 8-12

weeks for routine catheter exchange.

 

Thank you this referral.

 

 

Electronically Signed by

Lizbeth Patterson MD 10/07/2019 03:38 P

## 2020-01-02 ENCOUNTER — HOSPITAL ENCOUNTER (OUTPATIENT)
Dept: HOSPITAL 53 - M IRPRO | Age: 80
End: 2020-01-02
Attending: RADIOLOGY
Payer: MEDICARE

## 2020-01-02 VITALS — SYSTOLIC BLOOD PRESSURE: 194 MMHG | DIASTOLIC BLOOD PRESSURE: 86 MMHG

## 2020-01-02 DIAGNOSIS — N13.1: Primary | ICD-10-CM

## 2020-01-02 PROCEDURE — 50435 EXCHANGE NEPHROSTOMY CATH: CPT

## 2020-01-02 RX ADMIN — CIPROFLOXACIN ONE MG: 250 TABLET, FILM COATED ORAL at 15:39

## 2020-01-02 NOTE — POST-OPPD
Postoperative Procedure Note


Date Of Procedure:  Jan 2, 2020


Time Of Procedure:  16:32


PREOPERATIVE DIAGNOSIS: ureteral obstruction


POSTOPERATIVE DIAGNOSIS: same





FINDINGS: same 





PROCEDURE: right side neph exchanged





SURGEON: marlin





ANESTHESIA: local








ESTIMATED BLOOD LOSS: < 5 ml





COMPLICATIONS: none





POSTOPERATIVE CONDITION: stable











CHERYL VERMA MD                Jan 2, 2020 16:33

## 2020-01-03 NOTE — REP
IR Nephrostomy catheter exchange.

 

IR Nephrostogram and ureterogram.

 

Clinical information:  Ureteral obstruction.

 

Physician:  Dr. Patterson.

 

Procedure:  The patient was advised of the benefits, risks and alternatives of

the procedure and informed consent was obtained.

 

The time-out was performed with verification of the patient's name, MRN, site of

procedure and type of procedure to be performed.  The patient was positioned in

the prone position on the angiographic table.  The site was prepped and draped in

the usual sterile fashion.

 

Moderate sedation was not performed. The physician spent 30 minutes of continuous

face to face time with the patient.

 

A  radiograph reveals a right-sided nephrostomy catheter in expected

location.

 

An initial nephrostogram and ureterogram was performed through the preexisting

catheter confirming pigtail positioned in the renal collecting system.

 

Lidocaine was injected around the catheter exit site.

 

The catheter and sutures were cut, an Amplatz wire was passed into the renal

collecting system.  The preexisting nephrostomy catheter was removed over the

wire.  A new 8 -Australian nephrostomy catheter was advanced in the renal collecting

system.  A final nephrostogram and ureterogram was performed confirming position

of the pigtail in the renal pelvis with mild hydronephrosis. Ureterogram

demonstrates no flow past the uretero pelvic junction.

 

The catheter was secured in position with 2-0 Prolene and a sterile dressing was

applied.  The catheter was placed gravity drainage.

 

The patient tolerated the procedure well and was returned to the [facility] R [facility] in stable

condition.

 

EBL:  Less than 5 ml.

 

Complications:  None.

 

Conclusion:

1.  Nephrostogram and ureterogram demonstrate UPJ obstruction

2.  Successful right nephrostomy catheter exchange.  Patient to return in 8-12

weeks for routine catheter exchange.

 

Thank you this referral.

 

 

Electronically Signed by

Lizbeth Patterson MD 01/03/2020 09:55 A

## 2020-04-06 ENCOUNTER — HOSPITAL ENCOUNTER (OUTPATIENT)
Dept: HOSPITAL 53 - M IRPRO | Age: 80
End: 2020-04-06
Attending: RADIOLOGY
Payer: MEDICARE

## 2020-04-06 VITALS — SYSTOLIC BLOOD PRESSURE: 148 MMHG | DIASTOLIC BLOOD PRESSURE: 67 MMHG

## 2020-04-06 DIAGNOSIS — N13.1: Primary | ICD-10-CM

## 2020-04-06 NOTE — REP
IR Nephrostomy catheter exchange.

 

IR Nephrostogram and ureterogram.

 

Clinical information:  Ureteral stenosis. Bladder outlet issues.

 

Physician:  Dr. Patterson.

 

Procedure:  The patient was advised of the benefits, risks and alternatives of

the procedure and informed consent was obtained.

 

The time-out was performed with verification of the patient's name, MRN, site of

procedure and type of procedure to be performed.  The patient was positioned in

the prone position on the angiographic table.  The site was prepped and draped in

the usual sterile fashion.

 

Moderate sedation was not required. The physician spent 30 minutes of continuous

face to face time with the patient.

 

A  radiograph reveals a right-sided nephrostomy catheter in expected

location.

 

Lidocaine was injected around the catheter exit site.

 

The catheter and sutures were cut, an Amplatz wire was passed into the renal

collecting system.  The preexisting nephrostomy catheter was removed over the

wire.  A new 8.5 -Lao nephrostomy catheter was advanced in the renal

collecting system.  A final nephrostogram and ureterogram was performed

confirming position of the pigtail in the renal pelvis with mild hydronephrosis.

Ureterogram demonstrates ureter is patent to bladder.

 

The catheter was secured in position with 2-0 Prolene and a sterile dressing was

applied.  The catheter was placed gravity drainage.

 

The patient tolerated the procedure well and was returned to the [facility] in stable

condition.

 

EBL:  Less than 5 ml.

 

Complications:  None.

 

Conclusion:

1.  Nephrostogram and ureterogram demonstrate ureter is patent to bladder.

2.  Successful right nephrostomy catheter exchange.  Patient to return in 12

weeks for routine catheter exchange. 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

issues. No stenting planned. Continue follow up with urology.

 

Thank you this referral.

 

 

Electronically Signed by

Lizbeth Patterson MD 04/06/2020 02:34 P

## 2020-04-28 ENCOUNTER — HOSPITAL ENCOUNTER (OUTPATIENT)
Dept: HOSPITAL 53 - M TMIRPOV | Age: 80
End: 2020-04-28
Attending: RADIOLOGY
Payer: MEDICARE

## 2020-04-28 DIAGNOSIS — Z46.6: Primary | ICD-10-CM

## 2020-04-29 NOTE — IRPN
[facility] IR Progress Note


IR Progress Note


DATE:  Apr 28, 2020


FOLLOW-UP: Patient with chronic indwelling right nephrostomy catheter, catheter 

fell out last Friday. Patient reports she is urinating more now and getting up 3

times in the night to urinate. She drinks water right up to bedtime. No pain or 

burning with urination. No flank pain. No fevers or chills. 





ON EXAMINATION: No Video





Imaging: I personally reviewed the last nephrostogram. The ureter is patent to 

the bladder.  





IMPRESSION: 80 female with chronic indwelling right nephrostomy catheter now 

without catheter. No fever or chills and patient is urinating well. Advised 

patient to not drink water after 6 pm at night and this will reduce the number 

of times she needs to get up in the night. Patient has incontinence pads. Given 

the nephrostogram shows the ureter is patent to the bladder, this is a good 

opportunity to re evaluate her without the nephrostomy, especially as she is 

able to urinate freely without pain or discomfort. I will follow her up in 

clinic in 1 month US kidney. Continue follow up with nephrology. 





Thank you for this referral





Allergies


Coded Allergies:  


     Penicillins (Unverified  Allergy, Unknown, 5/14/19)


     latex (Verified  Allergy, Unknown, 7/4/19)


     peanut oil (Verified  Allergy, Unknown, 7/4/19)


     red dye (Verified  Allergy, Unknown, 7/4/19)


     budesonide (Unverified  Adverse Reaction, Intermediate, 5/14/19)


   


   NOTES AT PHARMACY [facility] PATIENT HAD BAD SINUS REACTION


   


   AFTER USING


     formoterol (Unverified  Adverse Reaction, Intermediate, 5/14/19)


   


   NOTES AT PHARMACY [facility] PATIENT HAD BAD SINUS REACTION


   


   AFTER USING











CHERYL VERMA MD               Apr 29, 2020 15:31

## 2020-06-02 ENCOUNTER — HOSPITAL ENCOUNTER (OUTPATIENT)
Dept: HOSPITAL 53 - M RAD | Age: 80
End: 2020-06-02
Attending: RADIOLOGY
Payer: MEDICARE

## 2020-06-02 ENCOUNTER — HOSPITAL ENCOUNTER (OUTPATIENT)
Dept: HOSPITAL 53 - M IRPOV | Age: 80
End: 2020-06-02
Attending: RADIOLOGY
Payer: MEDICARE

## 2020-06-02 DIAGNOSIS — N26.1: Primary | ICD-10-CM

## 2020-06-02 DIAGNOSIS — N35.92: ICD-10-CM

## 2020-06-02 PROCEDURE — 76775 US EXAM ABDO BACK WALL LIM: CPT

## 2020-06-02 NOTE — REP
RENAL ULTRASOUND:

 

Real-time sonographic evaluation of the kidneys performed.

 

Both kidneys are hyperechoic especially on the left suggesting medical renal

disease.  Left kidney is moderately atrophic.  Right kidney measures 10.5 x 5.4 x

3.5 cm and left kidney 6.8 x 3.5 x 2.9 cm. There is no hydronephrosis

bilaterally.   No renal mass is seen.  Urinary bladder is very mildly distended.

 

 

IMPRESSION:

 

Hyperechoic echotexture of the kidneys suggests medical renal disease.  No

hydronephrosis.  Moderate left renal atrophy.

 

 

Electronically Signed by

Bennie Andrews MD 06/03/2020 12:30 P

## 2020-06-03 NOTE — IRPN
[facility] IR Progress Note


IR Progress Note


DATE:  Jun 2, 2020


Consent was given by the patient for this telephone call. Length of call was 5 

minutes. 





FOLLOW-UP: Status post right nephrostomy fall out over a month ago. Ureter 

patent on prior ureterogram therefore trial without nephrostomy was initiated. 

Patient doing well. No pain, fevers or chills. Now reports urinating well with 

good stream and no discomfort.





Imaging: I personally reviewed the renal ultrasound performed same day. 

Unremarkable kidneys with no hydronephrosis. Partially decompressed bladder  





IMPRESSION: Doing well without nephrostomy. No further intervention or follow up

scheduled unless initiated by patient and or referring provider.





Thank you for this referral





cc Dr Mallory


[facility]





Allergies


Coded Allergies:  


     Penicillins (Unverified  Allergy, Unknown, 5/14/19)


     latex (Verified  Allergy, Unknown, 7/4/19)


     peanut oil (Verified  Allergy, Unknown, 7/4/19)


     red dye (Verified  Allergy, Unknown, 7/4/19)


     budesonide (Unverified  Adverse Reaction, Intermediate, 5/14/19)


   


   NOTES AT PHARMACY [facility] PATIENT HAD BAD SINUS REACTION


   


   AFTER USING


     formoterol (Unverified  Adverse Reaction, Intermediate, 5/14/19)


   


   NOTES AT PHARMACY [facility] PATIENT HAD BAD SINUS REACTION


   


   AFTER USING











CHERYL VERMA MD                Andrew 3, 2020 12:00

## 2021-10-26 ENCOUNTER — HOSPITAL ENCOUNTER (OUTPATIENT)
Dept: HOSPITAL 53 - M IRPOV | Age: 81
End: 2021-10-26
Attending: RADIOLOGY
Payer: MEDICARE

## 2021-10-26 VITALS — WEIGHT: 130.07 LBS | BODY MASS INDEX: 24.56 KG/M2 | HEIGHT: 61 IN

## 2021-10-26 VITALS — DIASTOLIC BLOOD PRESSURE: 71 MMHG | SYSTOLIC BLOOD PRESSURE: 163 MMHG

## 2021-10-26 DIAGNOSIS — Z91.018: ICD-10-CM

## 2021-10-26 DIAGNOSIS — Z79.890: ICD-10-CM

## 2021-10-26 DIAGNOSIS — Z91.040: ICD-10-CM

## 2021-10-26 DIAGNOSIS — Z88.0: ICD-10-CM

## 2021-10-26 DIAGNOSIS — Z96.0: ICD-10-CM

## 2021-10-26 DIAGNOSIS — Z79.899: ICD-10-CM

## 2021-10-26 DIAGNOSIS — N20.1: Primary | ICD-10-CM

## 2021-10-26 DIAGNOSIS — Z91.09: ICD-10-CM

## 2021-10-26 DIAGNOSIS — Z79.82: ICD-10-CM

## 2021-11-02 ENCOUNTER — HOSPITAL ENCOUNTER (OUTPATIENT)
Dept: HOSPITAL 53 - M RAD | Age: 81
End: 2021-11-02
Attending: RADIOLOGY
Payer: MEDICARE

## 2021-11-02 DIAGNOSIS — N20.0: Primary | ICD-10-CM

## 2021-11-02 NOTE — REP
INDICATION:

KIDNEY STONE.



COMPARISON:

04/11/2019



TECHNIQUE:

Noncontrast scanning through the abdomen pelvis with coronal and sagittal

reconstructions.



FINDINGS:

CT abdomen.  Lung bases show no effusion or acute infiltrate.  There is some

eventration of the left diaphragm which is elevated posteriorly.  Small hiatal hernia

noted.  There is some compressive atelectatic change above the elevated left

diaphragm.  Some inferior lingular segment fibrotic change anteriorly noted.  A

densely calcified granuloma in the medial basal segment of the right lower lobe again

seen.  The heart size unchanged with some left atrial and ventricular enlargement.  No

pericardial thickening or effusion.  Heavily calcified mitral annulus with some

calcifications at the aortic root and descending aorta.  Previous effusions revolved.

Liver is lobulated in its contour has an enlarged left hepatic lobe consistent with

chronic liver disease.  No splenomegaly.  No focal hepatic mass or biliary dilatation.

No upper abdominal ascites.  Gallbladder with some calcified stones.  Colon shows

scattered diverticula the throughout without signs of colitis or diverticulitis.  Many

of them contain oral contrast from previous ingestion no colitis or diverticulitis,

stricture or mass.  Small bowel loops fluid-filled without dilatation or air-fluid

levels.  No inflammatory changes in the mesentery.  Small periaortic nodes present but

not of pathologic size.  I do not see other retroperitoneal or mesenteric pathologic

sized nodes.  Pancreas grossly intact.  Adrenal glands are normal.  There is severe

atrophy of the left kidney as before with sinus lipomatosis and marked cortical

thinning, decreased overall length.  The right kidney shows an indwelling percutaneous

stent coiled in the renal pelvis.  This is similar to the previous study.  Hypodensity

medially in the upper pole may reflect cortical cyst.  I do not see ureteral

dilatation or stone on either side.  Lung window review of all CT slices in the

abdomen and pelvis shows no perforation or free air.  See no evidence for an abscess.

Bone windows show the bones demineralized but without compression deformity in the

lower thoracic or lumbar spine.  There is facet arthropathy and some degenerative disc

disease greatest at L5-S1.  Visualized ribs grossly intact.



CT pelvis: SI joints with slight narrowing and some sclerotic change iliac greater

than sacral margins without sacral fracture or focal lesion.  There is advanced

arthritis with bone-on-bone appearance of the femoral heads centrally and superiorly

prominent rim osteophyte its right greater than left femoral head and marginal

osteophytes acetabulum.  Subchondral cysts in the acetabular roof.  Some old

posttraumatic change with healing of the right superior pubic ramus at the symphysis

pubis left pubic rami unremarkable distal of left colon sigmoid with diverticulosis

but no diverticulitis heavy atherosclerotic calcifications in iliac vessels

bilaterally.  No iliac aneurysm.  Uterus not enlarged and is anteverted.  No pelvic or

adnexal mass.  No pelvic free fluid.  There is no ventral or inguinal hernia.



IMPRESSION:

1. Severe atrophy left kidney and indwelling percutaneous renal stent on the right

without significant hydronephrosis and no stone or visible mass there is compensatory

hypertrophy of the right kidney.

2. Diverticulosis the without diverticulitis, colitis, stricture or mass.

3. Infrarenal abdominal aortic aneurysm currently 4.1 x 4.2 cm previously 4 by 3.9 cm

4/19.

4. Chronic liver disease with lobulated hepatic contour and prominent left hepatic

lobe consistent with cirrhosis..  Some old granulomatous disease in the spleen which

is not enlarged.  No ascites.  No new or acute finding.





<Electronically signed by David Man > 11/02/21 [id number]

## 2021-11-15 ENCOUNTER — HOSPITAL ENCOUNTER (OUTPATIENT)
Dept: HOSPITAL 53 - M IRPRO | Age: 81
End: 2021-11-15
Attending: RADIOLOGY
Payer: MEDICARE

## 2021-11-15 VITALS — SYSTOLIC BLOOD PRESSURE: 190 MMHG | DIASTOLIC BLOOD PRESSURE: 82 MMHG

## 2021-11-15 DIAGNOSIS — Z88.0: ICD-10-CM

## 2021-11-15 DIAGNOSIS — Z91.018: ICD-10-CM

## 2021-11-15 DIAGNOSIS — Z88.8: ICD-10-CM

## 2021-11-15 DIAGNOSIS — Z93.6: ICD-10-CM

## 2021-11-15 DIAGNOSIS — N20.0: Primary | ICD-10-CM

## 2021-11-15 DIAGNOSIS — Z91.040: ICD-10-CM

## 2021-11-15 PROCEDURE — 50431 NJX PX NFROSGRM &/URTRGRM: CPT

## 2021-11-18 NOTE — IRPON
IR Postoperative Note


Date Of Procedure:  Nov 15, 2021


Time Of Procedure:  16:00


IR Postoperative Note


IR nephrostogram and ureterogram.





Clinical indication: Right nephrostomy placed at outside hospital for 

obstructing stone.





 radiograph demonstrates right nephrostomy catheter.





The nephrostomy catheter was accessed in a sterile manner and injected with 

contrast.  This demonstrates patent ureter to the bladder.  No obstruction.





Impression: Nephrostogram and ureterogram demonstrates patent right ureter to 

the bladder.  CT abdomen pelvis demonstrates no ureteral stone.  The right 

nephrostomy catheter was capped.  If patient tolerates nephrostomy capping 

trial, the nephrostomy catheter will be removed.





CC CHERYL Amezcua MD               Nov 18, 2021 15:54

## 2021-12-07 ENCOUNTER — HOSPITAL ENCOUNTER (OUTPATIENT)
Dept: HOSPITAL 53 - M IRPOV | Age: 81
End: 2021-12-07
Attending: RADIOLOGY
Payer: MEDICARE

## 2021-12-07 VITALS — HEIGHT: 60 IN | WEIGHT: 140.21 LBS | BODY MASS INDEX: 27.53 KG/M2

## 2021-12-07 VITALS — SYSTOLIC BLOOD PRESSURE: 168 MMHG | DIASTOLIC BLOOD PRESSURE: 70 MMHG

## 2021-12-07 DIAGNOSIS — Z91.040: ICD-10-CM

## 2021-12-07 DIAGNOSIS — Z88.8: ICD-10-CM

## 2021-12-07 DIAGNOSIS — Z91.09: ICD-10-CM

## 2021-12-07 DIAGNOSIS — Z88.0: ICD-10-CM

## 2021-12-07 DIAGNOSIS — Z43.6: Primary | ICD-10-CM

## 2021-12-07 DIAGNOSIS — Z91.018: ICD-10-CM

## 2021-12-09 NOTE — IRPN
[facility] IR Progress Note


IR Progress Note


DATE:  Dec 7, 2021


FOLLOW-UP: Patient with right-sided nephrostomy catheter placed at outside 

hospital, reportedly for obstructing stone. CT demonstrates no obstructing stone

in the ureter.  Nephrostogram and ureterogram demonstrate the right kidney is 

patent to the bladder.  The nephrostomy catheter has been capped for greater 

than 2 weeks.  No fevers, chills or back pain.  Patient is urinating normally.





ON EXAMINATION: Right nephrostomy catheter in place. 





The nephrostomy catheter was cut and removed in its entirety. A sterile dressing

was applied to the site.  





IMPRESSION: 81-year-old female with right-sided nephrostomy catheter, placed at 

outside hospital, reportedly for obstructing stone.  





CT since that time and nephrostogram and ureterogram demonstrate no obstructing 

stones. The kidney and ureter are patent to the bladder. 





Patient has passed a nephrostomy capping trial, for the past 2 weeks, with no 

fevers, chills or flank pain.  She is urinating normally.  





The nephrostomy catheter was removed.  If patient has recurrent ureteral 

obstruction or new stone, patient should be referred to urology for internal 

ureteral stenting.





Thank you for this referral. 





CC Dr. Estrella





Allergies


Coded Allergies:  


     Penicillins (Unverified  Allergy, Unknown, 5/14/19)


     latex (Verified  Allergy, Unknown, 7/4/19)


     peanut oil (Verified  Allergy, Unknown, 7/4/19)


     red dye (Verified  Allergy, Unknown, 7/4/19)


     budesonide (Unverified  Adverse Reaction, Intermediate, 5/14/19)


   NOTES AT PHARMACY [facility] PATIENT HAD BAD SINUS REACTION


   AFTER USING


     formoterol (Unverified  Adverse Reaction, Intermediate, 5/14/19)


   NOTES AT PHARMACY [facility] PATIENT HAD BAD SINUS REACTION


   AFTER USING





VS,Fishbone, I+O


VS, Fishbone, I+O





Vital Signs








  Date Time  Temp Pulse Resp B/P (MAP) Pulse Ox O2 Delivery O2 Flow Rate FiO2


 


12/7/21 14:15 98.6 82 20 168/70 (102) 97 Room Air  

















CHERYL VERMA MD                Dec 9, 2021 14:40

## 2024-10-04 NOTE — NUR
Swallow Eval Recommendations: 



Level 2 mechanically altered (NDD) solids

Nectar thick liquids, straws OK

Puree assist for med administration prn

Assist for feeding & upright positioning



Dysphagia tx for compensatory strategy training. 

-------------------------------------------------------------------------------

Addendum: 04/12/19 at 1227 by MUNIRA STEWART [facility]

-------------------------------------------------------------------------------

Amended: Links added. ,